# Patient Record
Sex: FEMALE | Race: WHITE | NOT HISPANIC OR LATINO
[De-identification: names, ages, dates, MRNs, and addresses within clinical notes are randomized per-mention and may not be internally consistent; named-entity substitution may affect disease eponyms.]

---

## 2017-06-18 ENCOUNTER — FORM ENCOUNTER (OUTPATIENT)
Age: 77
End: 2017-06-18

## 2017-07-22 ENCOUNTER — INPATIENT (INPATIENT)
Facility: HOSPITAL | Age: 77
LOS: 2 days | Discharge: HOME | DRG: 66 | End: 2017-07-25
Attending: PSYCHIATRY & NEUROLOGY | Admitting: PSYCHIATRY & NEUROLOGY
Payer: MEDICARE

## 2017-07-22 VITALS
TEMPERATURE: 88 F | RESPIRATION RATE: 20 BRPM | DIASTOLIC BLOOD PRESSURE: 84 MMHG | SYSTOLIC BLOOD PRESSURE: 138 MMHG | HEART RATE: 111 BPM

## 2017-07-22 LAB
ALBUMIN SERPL ELPH-MCNC: 3.4 G/DL — SIGNIFICANT CHANGE UP (ref 3.3–5)
ALP SERPL-CCNC: 79 U/L — SIGNIFICANT CHANGE UP (ref 40–120)
ALT FLD-CCNC: 10 U/L RC — SIGNIFICANT CHANGE UP (ref 10–45)
ANION GAP SERPL CALC-SCNC: 14 MMOL/L — SIGNIFICANT CHANGE UP (ref 5–17)
APTT BLD: 26.9 SEC — LOW (ref 27.5–37.4)
AST SERPL-CCNC: 33 U/L — SIGNIFICANT CHANGE UP (ref 10–40)
BASOPHILS # BLD AUTO: 0 K/UL — SIGNIFICANT CHANGE UP (ref 0–0.2)
BASOPHILS NFR BLD AUTO: 0.5 % — SIGNIFICANT CHANGE UP (ref 0–2)
BILIRUB SERPL-MCNC: 1 MG/DL — SIGNIFICANT CHANGE UP (ref 0.2–1.2)
BLD GP AB SCN SERPL QL: POSITIVE — SIGNIFICANT CHANGE UP
BUN SERPL-MCNC: 10 MG/DL — SIGNIFICANT CHANGE UP (ref 7–23)
CALCIUM SERPL-MCNC: 9 MG/DL — SIGNIFICANT CHANGE UP (ref 8.4–10.5)
CHLORIDE SERPL-SCNC: 103 MMOL/L — SIGNIFICANT CHANGE UP (ref 96–108)
CK MB BLD-MCNC: 1.5 % — SIGNIFICANT CHANGE UP (ref 0–3.5)
CK MB CFR SERPL CALC: 1 NG/ML — SIGNIFICANT CHANGE UP (ref 0–3.8)
CK SERPL-CCNC: 65 U/L — SIGNIFICANT CHANGE UP (ref 25–170)
CO2 SERPL-SCNC: 25 MMOL/L — SIGNIFICANT CHANGE UP (ref 22–31)
CREAT SERPL-MCNC: 1.34 MG/DL — HIGH (ref 0.5–1.3)
EOSINOPHIL # BLD AUTO: 0.2 K/UL — SIGNIFICANT CHANGE UP (ref 0–0.5)
EOSINOPHIL NFR BLD AUTO: 2.9 % — SIGNIFICANT CHANGE UP (ref 0–6)
GAS PNL BLDV: SIGNIFICANT CHANGE UP
GLUCOSE SERPL-MCNC: 97 MG/DL — SIGNIFICANT CHANGE UP (ref 70–99)
HCT VFR BLD CALC: 35 % — SIGNIFICANT CHANGE UP (ref 34.5–45)
HGB BLD-MCNC: 11.6 G/DL — SIGNIFICANT CHANGE UP (ref 11.5–15.5)
INR BLD: 1.12 RATIO — SIGNIFICANT CHANGE UP (ref 0.88–1.16)
LYMPHOCYTES # BLD AUTO: 2.7 K/UL — SIGNIFICANT CHANGE UP (ref 1–3.3)
LYMPHOCYTES # BLD AUTO: 40.8 % — SIGNIFICANT CHANGE UP (ref 13–44)
MCHC RBC-ENTMCNC: 30 PG — SIGNIFICANT CHANGE UP (ref 27–34)
MCHC RBC-ENTMCNC: 33.1 GM/DL — SIGNIFICANT CHANGE UP (ref 32–36)
MCV RBC AUTO: 90.9 FL — SIGNIFICANT CHANGE UP (ref 80–100)
MONOCYTES # BLD AUTO: 0.5 K/UL — SIGNIFICANT CHANGE UP (ref 0–0.9)
MONOCYTES NFR BLD AUTO: 7.3 % — SIGNIFICANT CHANGE UP (ref 2–14)
NEUTROPHILS # BLD AUTO: 3.2 K/UL — SIGNIFICANT CHANGE UP (ref 1.8–7.4)
NEUTROPHILS NFR BLD AUTO: 48.5 % — SIGNIFICANT CHANGE UP (ref 43–77)
PLATELET # BLD AUTO: 373 K/UL — SIGNIFICANT CHANGE UP (ref 150–400)
POTASSIUM SERPL-MCNC: 5 MMOL/L — SIGNIFICANT CHANGE UP (ref 3.5–5.3)
POTASSIUM SERPL-SCNC: 5 MMOL/L — SIGNIFICANT CHANGE UP (ref 3.5–5.3)
PROT SERPL-MCNC: 7.3 G/DL — SIGNIFICANT CHANGE UP (ref 6–8.3)
PROTHROM AB SERPL-ACNC: 12.2 SEC — SIGNIFICANT CHANGE UP (ref 9.8–12.7)
RBC # BLD: 3.85 M/UL — SIGNIFICANT CHANGE UP (ref 3.8–5.2)
RBC # FLD: 13.7 % — SIGNIFICANT CHANGE UP (ref 10.3–14.5)
RH IG SCN BLD-IMP: NEGATIVE — SIGNIFICANT CHANGE UP
SODIUM SERPL-SCNC: 142 MMOL/L — SIGNIFICANT CHANGE UP (ref 135–145)
TROPONIN T SERPL-MCNC: <0.01 NG/ML — SIGNIFICANT CHANGE UP (ref 0–0.06)
WBC # BLD: 6.6 K/UL — SIGNIFICANT CHANGE UP (ref 3.8–10.5)
WBC # FLD AUTO: 6.6 K/UL — SIGNIFICANT CHANGE UP (ref 3.8–10.5)

## 2017-07-22 PROCEDURE — 71010: CPT | Mod: 26

## 2017-07-22 PROCEDURE — 99285 EMERGENCY DEPT VISIT HI MDM: CPT

## 2017-07-22 NOTE — ED PROVIDER NOTE - SKIN, MLM
[facility]    [address] [place] [address]    Phone:  [phone]    Fax:  [phone]       Thank You for choosing us for your health care visit. We are glad to serve you and happy to provide you with this summary of your visit. Please help us to ensure we have accurate records. If you find anything that needs to be changed, please let our staff know as soon as possible.          Your Demographic Information     Patient Name Sex Anni Lofton Female 1992       Ethnic Group Patient Race    / Origin White      Your Visit Details     Date & Time Provider Department    2017 7:45 PM Anne Marie Painting NP [facility]      Your To Do List     Follow-Up    Return if symptoms worsen or fail to improve.      Conditions Discussed Today or Order-Related Diagnoses        Comments    Allergic rhinitis, unspecified allergic rhinitis trigger, unspecified rhinitis seasonality    -  Primary       Your Vitals Were     Resp Height Weight BMI Smoking Status       16 5' 5\" (1.651 m) 125 lb (56.7 kg) 20.8 kg/m2 Former Smoker       Medications Prescribed or Re-Ordered Today     None      Your Current Medications Are        Disp Refills Start End    amoxicillin (AMOXIL) 875 MG tablet 20 tablet 0 2017    Sig - Route: Take 1 tablet by mouth 2 times daily for 10 days. - Oral    Class: Eprescribe      Allergies     No Known Allergies      Immunizations History as of 2017     Name Date    HPV QUAD 2008, 2008, 2008    Tdap 2008            Patient Instructions     None      
R neck scabbing, well healed surgical scar.

## 2017-07-22 NOTE — ED PROVIDER NOTE - MEDICAL DECISION MAKING DETAILS
Charis resident: 76 y/o with hx of recent R sided CEA p/w L facial droop and slurred speech - remainder of stroke exam WNL - last normal at 1 pm - will CT head and CTA head and neck to eval for stroke and d/w neuro

## 2017-07-22 NOTE — ED PROVIDER NOTE - ATTENDING CONTRIBUTION TO CARE
Patient with facial droop and change in speech since 1pm. Moderate. Persistent. History of right carotid stent with recent reevaluation of this. Not better with time.   patient with droop to left face, mild slurred speech, droop involves v2,3 on left and spares forehead, perrl, eomi, mmm, cooperative, no tongue deviation, no edema, non-tachycardic, non-tachypneic, no rash, 4+/5 lue, 5/5 rue, 5/5 bilateral lower extremities, soft, ntnd, no rebound/guarding  will get iv, labs, ct head, asa coverage if no ich, neuro consult and admit to neurology.

## 2017-07-22 NOTE — ED PROVIDER NOTE - OBJECTIVE STATEMENT
78 y/o female HLD, COPD p/w facial droop and slurred speech. Per daughter, patient noted to have L sided facial droop and slurred speech at 1 pm today. BIBA for possible stroke. Denies any headache, vision changes, CP, SOB, N/V/D, numbness, weakness. Recent R carotid stent in May, surgery went well.  PMD: Dr. Coleman

## 2017-07-22 NOTE — ED PROVIDER NOTE - PROGRESS NOTE DETAILS
spoke with neurology - will see patient in ED R MCA infarct in frontal lobe, CTA shows branch occlusion of opercunlar branch

## 2017-07-22 NOTE — ED PROVIDER NOTE - CRANIAL NERVE AND PUPILLARY EXAM
extra-ocular movements intact/peripheral vision intact/L facial droop and slurred speech - able to wrinkle forehead on both sides of face

## 2017-07-22 NOTE — ED PROVIDER NOTE - EYES, MLM
Clear bilaterally, pupils equal, round and reactive to light. EOMI. Periopheral and central vision intact.

## 2017-07-23 DIAGNOSIS — I63.59 CEREBRAL INFARCTION DUE TO UNSPECIFIED OCCLUSION OR STENOSIS OF OTHER CEREBRAL ARTERY: ICD-10-CM

## 2017-07-23 LAB
CK MB CFR SERPL CALC: 1 NG/ML — SIGNIFICANT CHANGE UP (ref 0–3.8)
CK SERPL-CCNC: 40 U/L — SIGNIFICANT CHANGE UP (ref 25–170)
TROPONIN T SERPL-MCNC: <0.01 NG/ML — SIGNIFICANT CHANGE UP (ref 0–0.06)

## 2017-07-23 PROCEDURE — 70548 MR ANGIOGRAPHY NECK W/DYE: CPT | Mod: 26

## 2017-07-23 PROCEDURE — 99223 1ST HOSP IP/OBS HIGH 75: CPT

## 2017-07-23 PROCEDURE — 70496 CT ANGIOGRAPHY HEAD: CPT | Mod: 26

## 2017-07-23 PROCEDURE — 70551 MRI BRAIN STEM W/O DYE: CPT | Mod: 26

## 2017-07-23 PROCEDURE — 93010 ELECTROCARDIOGRAM REPORT: CPT

## 2017-07-23 PROCEDURE — 70450 CT HEAD/BRAIN W/O DYE: CPT | Mod: 26,77

## 2017-07-23 PROCEDURE — 70498 CT ANGIOGRAPHY NECK: CPT | Mod: 26

## 2017-07-23 RX ORDER — ATORVASTATIN CALCIUM 80 MG/1
80 TABLET, FILM COATED ORAL AT BEDTIME
Qty: 0 | Refills: 0 | Status: DISCONTINUED | OUTPATIENT
Start: 2017-07-23 | End: 2017-07-25

## 2017-07-23 RX ORDER — ENOXAPARIN SODIUM 100 MG/ML
40 INJECTION SUBCUTANEOUS EVERY 24 HOURS
Qty: 0 | Refills: 0 | Status: DISCONTINUED | OUTPATIENT
Start: 2017-07-23 | End: 2017-07-25

## 2017-07-23 RX ORDER — ASPIRIN/CALCIUM CARB/MAGNESIUM 324 MG
81 TABLET ORAL DAILY
Qty: 0 | Refills: 0 | Status: DISCONTINUED | OUTPATIENT
Start: 2017-07-23 | End: 2017-07-25

## 2017-07-23 RX ORDER — LABETALOL HCL 100 MG
10 TABLET ORAL ONCE
Qty: 0 | Refills: 0 | Status: COMPLETED | OUTPATIENT
Start: 2017-07-23 | End: 2017-07-23

## 2017-07-23 RX ADMIN — Medication 10 MILLIGRAM(S): at 02:43

## 2017-07-23 RX ADMIN — Medication 81 MILLIGRAM(S): at 13:15

## 2017-07-23 RX ADMIN — ATORVASTATIN CALCIUM 80 MILLIGRAM(S): 80 TABLET, FILM COATED ORAL at 21:28

## 2017-07-23 RX ADMIN — ENOXAPARIN SODIUM 40 MILLIGRAM(S): 100 INJECTION SUBCUTANEOUS at 13:15

## 2017-07-23 NOTE — H&P ADULT - NSHPLABSRESULTS_GEN_ALL_CORE
< from: CT Head No Cont (07.23.17 @ 01:11) >    NONCONTRAST HEAD CT: Acute/subacute right MCA territorial infarction   without hemorrhagic transformation.    Additional microvascular disease.    CTA NECK: Evidence of right-sided carotid endarterectomy. Atherosclerotic   disease involving the left carotid bifurcation with approximately 20-30%   stenosis of the left internal carotid artery origin by NASCET criteria.    CTA HEAD: Branch occlusion of an opercular branch of the right middle   cerebral artery.

## 2017-07-23 NOTE — PROVIDER CONTACT NOTE (OTHER) - BACKGROUND
episode lasted about 10 seconds and then patient returned to baseline. pts BP during episode was 104/74 with pulse 101. post episode BP was 148/83 and pulse 99.

## 2017-07-23 NOTE — ED ADULT NURSE REASSESSMENT NOTE - NS ED NURSE REASSESS COMMENT FT1
0014 pt still waiting for ct scan to be performed/pt noted speech slurring has resolved for now/vss/pending ct and results/gcruz
Pt AAOx4, NAD, resting comfortably in bed, pt awaiting bed, /90, MD aware, 10mg Labetalol IVP given as ordered by MD, 4 Andrea RN made aware in verbal report, report called to 4 Andrea RN Andaria. Pt passed dysphagia screening, 4 Andrea RN made aware in verbal report. Pt on CCM with . Pt denies any pain. Pt neurologically intact, PERRLA at 3mm, no slurred speech at this time, pt states "my voice sounds normal again," facial droop resolved, pt moving all extremities, extremities equal and strong x 4. Pt awaiting  transport to floor. Safety maintained.  Transport here to pt at 0255.

## 2017-07-23 NOTE — H&P ADULT - ASSESSMENT
Pt is a 76 yo F with a PMH of HLD and COPD who came into the ED complaining of left facial droop and slurred speech. NIHSS: 1. RAVI: 1.    Diagnosis:  - Subacute Right MCA Territory Infarct    Recommendations:  - Admission to Neuro Floor for monitoring to be followed by stroke team  - Continuous tele monitoring  - Neuro checks Q4  - MRI Head  - MRA Head and Neck  - TTE with bubble study  - ASA and Lipitor when possible  - PT/OT  - Speech and Swallow Eval  - DVT ppx  - Permissive HTN to 220/110

## 2017-07-23 NOTE — PROVIDER CONTACT NOTE (OTHER) - SITUATION
PCA with patient walking pt back from bathroom and patient had a new syncope and stopped following commands, patient put back to bed. patient did not fall.

## 2017-07-23 NOTE — H&P ADULT - HISTORY OF PRESENT ILLNESS
Pt is a 78 yo F with a PMH of HLD and COPD who came into the ED complaining of left facial droop and slurred speech. As per daughter the patient was noted to have this left sided facial droop and slurred speech at 1 pm today. Pt denies any fevers, chills, sweats, headache, vision changes, CP, SOB, numbness or extremity weakness. Of note pt had a right CEA in May 2017. NIHSS: 1. RAVI: 1.

## 2017-07-23 NOTE — PROVIDER CONTACT NOTE (OTHER) - ACTION/TREATMENT ORDERED:
MD Allen at bedside assessing patient. ordered urgent head CT, cardiac enzymes and labs, and an EKG, and bedrest until further notice.

## 2017-07-23 NOTE — ED ADULT NURSE NOTE - OBJECTIVE STATEMENT
2130 pt 77y f aox3 bib ems, presented w/ lft facial droop and slurred speech per family last seen normal was possibly 1pm today, daughter at bedside said her sister called her and states that the mother is not speaking right, called ems, arrived vss at this time pending ct/will monitor rsr/gcruz

## 2017-07-23 NOTE — H&P ADULT - NSHPPHYSICALEXAM_GEN_ALL_CORE
General Exam:   General appearance: No acute distress      Neurological Exam:  Mental Status: Orientated to self, date and place.  Attention intact.  No dysarthria. Speech fluent.  Cranial Nerves:   PERRL, EOMI, VFF, no nystagmus.    CN V1-3 intact to light touch .  Mild naso-labial flattening on the left. Tongue, uvula and palate midline.  Sternocleidomastoid and Trapezius intact bilaterally.    Motor:   Tone: normal.                  Strength:     [] Upper extremity                      Delt       Bicep    Tricep                                                  R         5/5 5/5 5/5 5/5                                               L          5/5 5/5 5/5 5/5  [] Lower extremity                       HF          KE          KF        DF         PF                                               R        5/5 5/5 5/5 5/5 5/5                                               L         5/5 5/5 5/5 5/5 5/5  Pronator drift: none                 Dysmetria: None to finger-nose-finger or heel-shin-heel  Tremor: No resting, postural or action tremor.  No myoclonus.    Sensation: intact to light touch    Deep Tendon Reflexes:              Biceps    BR        Patellar        Ankle      Babinski                                         R       2+          2+         2+               2+          downgoing                                         L        2+          2+         2+              2+           downgoing

## 2017-07-23 NOTE — ED ADULT NURSE REASSESSMENT NOTE - GENERAL PATIENT STATE
comfortable appearance/cooperative/improvement verbalized/resting/sleeping/smiling/interactive
anxious

## 2017-07-23 NOTE — H&P ADULT - ATTENDING COMMENTS
Ms. Thomas is a 78 yo F with a PMH of HLD and COPD , status post right CEA in May 2017 Hospital who came into the ED complaining of left facial droop and slurred speech. As per daughter the patient was noted to have this left sided facial droop and slurred speech at 1 pm on 7/22/2017.her neurological exam is significant for right-sided gaze preference, might left nasolabial fold asymmetry. She also had an episode of shaking with altered sensorium since her admission.  Impression Right MCA territory ischemic infarct likely embolic in origin versus large artery atherosclerosis  CT angiogram does not show significant stenosis in the area of right internal carotid,  She had right-sided carotid endarterectomy performed in the hospital; arrange records and also get a vascular surgery consultation  MRI brain stroke protocol  EEG  PT OT speech evaluation

## 2017-07-24 ENCOUNTER — TRANSCRIPTION ENCOUNTER (OUTPATIENT)
Age: 77
End: 2017-07-24

## 2017-07-24 LAB
CHOLEST SERPL-MCNC: 137 MG/DL — SIGNIFICANT CHANGE UP (ref 10–199)
HBA1C BLD-MCNC: 5.2 % — SIGNIFICANT CHANGE UP (ref 4–5.6)
HDLC SERPL-MCNC: 45 MG/DL — SIGNIFICANT CHANGE UP (ref 40–125)
LIPID PNL WITH DIRECT LDL SERPL: 69 MG/DL — SIGNIFICANT CHANGE UP
TOTAL CHOLESTEROL/HDL RATIO MEASUREMENT: 3 RATIO — LOW (ref 3.3–7.1)
TRIGL SERPL-MCNC: 113 MG/DL — SIGNIFICANT CHANGE UP (ref 10–149)

## 2017-07-24 PROCEDURE — 95819 EEG AWAKE AND ASLEEP: CPT | Mod: 26

## 2017-07-24 PROCEDURE — 95957 EEG DIGITAL ANALYSIS: CPT | Mod: 26

## 2017-07-24 RX ORDER — BUDESONIDE AND FORMOTEROL FUMARATE DIHYDRATE 160; 4.5 UG/1; UG/1
2 AEROSOL RESPIRATORY (INHALATION)
Qty: 0 | Refills: 0 | Status: DISCONTINUED | OUTPATIENT
Start: 2017-07-24 | End: 2017-07-25

## 2017-07-24 RX ORDER — CLOPIDOGREL BISULFATE 75 MG/1
75 TABLET, FILM COATED ORAL DAILY
Qty: 0 | Refills: 0 | Status: DISCONTINUED | OUTPATIENT
Start: 2017-07-25 | End: 2017-07-25

## 2017-07-24 RX ORDER — PANTOPRAZOLE SODIUM 20 MG/1
40 TABLET, DELAYED RELEASE ORAL
Qty: 0 | Refills: 0 | Status: DISCONTINUED | OUTPATIENT
Start: 2017-07-24 | End: 2017-07-25

## 2017-07-24 RX ORDER — CLOPIDOGREL BISULFATE 75 MG/1
75 TABLET, FILM COATED ORAL DAILY
Qty: 0 | Refills: 0 | Status: DISCONTINUED | OUTPATIENT
Start: 2017-07-24 | End: 2017-07-24

## 2017-07-24 RX ORDER — CLOPIDOGREL BISULFATE 75 MG/1
1 TABLET, FILM COATED ORAL
Qty: 0 | Refills: 0 | COMMUNITY
Start: 2017-07-24

## 2017-07-24 RX ORDER — CLOPIDOGREL BISULFATE 75 MG/1
1 TABLET, FILM COATED ORAL
Qty: 30 | Refills: 0 | OUTPATIENT
Start: 2017-07-24

## 2017-07-24 RX ORDER — CLOPIDOGREL BISULFATE 75 MG/1
75 TABLET, FILM COATED ORAL ONCE
Qty: 0 | Refills: 0 | Status: COMPLETED | OUTPATIENT
Start: 2017-07-24 | End: 2017-07-24

## 2017-07-24 RX ADMIN — Medication 81 MILLIGRAM(S): at 11:30

## 2017-07-24 RX ADMIN — ATORVASTATIN CALCIUM 80 MILLIGRAM(S): 80 TABLET, FILM COATED ORAL at 22:01

## 2017-07-24 RX ADMIN — CLOPIDOGREL BISULFATE 75 MILLIGRAM(S): 75 TABLET, FILM COATED ORAL at 15:05

## 2017-07-24 RX ADMIN — ENOXAPARIN SODIUM 40 MILLIGRAM(S): 100 INJECTION SUBCUTANEOUS at 15:07

## 2017-07-24 NOTE — DISCHARGE NOTE ADULT - PATIENT PORTAL LINK FT
“You can access the Follow[employer] Patient Portal, offered by [facility], by registering with the following website: [email or web address]”

## 2017-07-24 NOTE — DISCHARGE NOTE ADULT - CARE PLAN
Principal Discharge DX:	Cerebrovascular accident (CVA) due to occlusion of other cerebral artery  Goal:	Secondary Stroke Prevention  Instructions for follow-up, activity and diet:	Please follow up with your primary medical doctor within one week of discharge from the hospital.    Please follow up with your neurologist within one week of discharge from the hospital. Please also continue to take your Plavix for th next 3 months and discuss with your neurologist when to stop taking it.    Please follow up with your cardiologist within one week of discharge from the hospital. Please discuss with them your need for an echocardiogram and a loop recorder.    Please continue to take your home medications as prescribed by your doctor.    Please continue to participate in your physical therapy. Principal Discharge DX:	Cerebrovascular accident (CVA) due to occlusion of other cerebral artery  Goal:	Secondary Stroke Prevention  Instructions for follow-up, activity and diet:	Please follow up with your primary medical doctor within one week of discharge from the hospital.  Please follow up with your neurologist within one week of discharge from the hospital. Please also continue to take your Plavix for the next 3 week and discuss with your neurologist when to stop taking it.  Please follow up with your cardiologist within one week of discharge from the hospital. Please discuss with them your need for an echocardiogram and a loop recorder.  Please continue to take your home medications as prescribed by your doctor.  Please continue to participate in your physical therapy.

## 2017-07-24 NOTE — OCCUPATIONAL THERAPY INITIAL EVALUATION ADULT - LIVES WITH, PROFILE
spouse/Pt lives with  in private home, has 2 stairs with railing on both sides to enter home, and 1 flight of stairs inside. Pt's bedroom and bathroom is on ground floor, with a tub shower that has bars, and a raised toilet seat with grab bars. Pt is right handed, wears glasses for reading, ambulates with rolling walker, and was previously independent in ADLs and most IADLs.  helped her as needed with some IADLs.

## 2017-07-24 NOTE — PROGRESS NOTE ADULT - SUBJECTIVE AND OBJECTIVE BOX
THE PATIENT WAS SEEN AND EXAMINED BY ME WITH THE HOUSESTAFF AND STROKE TEAM DURING MORNING ROUNDS.   HPI:  Pt is a 78 yo F with a PMH of HLD and COPD who came into the ED complaining of left facial droop and slurred speech. As per daughter the patient was noted to have this left sided facial droop and slurred speech at 1 pm day of admission. Pt denied any fevers, chills, sweats, headache, vision changes, CP, SOB, numbness or extremity weakness. Of note pt had a right CEA in May 2017. NIHSS: 1. RAVI: 1.     SUBJECTIVE: No events overnight.  No new neurologic complaints.      aspirin enteric coated 81 milliGRAM(s) Oral daily  enoxaparin Injectable 40 milliGRAM(s) SubCutaneous every 24 hours  atorvastatin 80 milliGRAM(s) Oral at bedtime      PHYSICAL EXAM:   Vital Signs Last 24 Hrs  T(C): 36.8 (24 Jul 2017 07:54), Max: 36.8 (23 Jul 2017 23:43)  T(F): 98.3 (24 Jul 2017 07:54), Max: 98.3 (24 Jul 2017 07:54)  HR: 107 (24 Jul 2017 12:35) (83 - 107)  BP: 131/75 (24 Jul 2017 12:35) (131/75 - 170/79)  BP(mean): --  RR: 18 (24 Jul 2017 07:54) (18 - 18)  SpO2: 91% (24 Jul 2017 12:35) (90% - 96%)    General: No acute distress  HEENT: EOM intact, visual fields full  Abdomen: Soft, nontender, nondistended   Extremities: No edema    NEUROLOGICAL EXAM:  Mental status: Awake, alert, oriented x3, no aphasia, no neglect, normal memory   Cranial Nerves: mild left nasolabial fold asymmmetry, no nystagmus, no dysarthria, no dysphagia, tongue midline, shoulder shrug intact bilaterally.  Motor exam: moves all extremities well against gravity.  Sensation: Intact to light touch   Coordination/ Gait: No dysmetria, FELICIANO intact and symmetric bilaterally    LABS:                        11.6   6.6   )-----------( 373      ( 22 Jul 2017 20:44 )             35.0    07-22    142  |  103  |  10  ----------------------------<  97  5.0   |  25  |  1.34<H>    Ca    9.0      22 Jul 2017 20:44    TPro  7.3  /  Alb  3.4  /  TBili  1.0  /  DBili  x   /  AST  33  /  ALT  10  /  AlkPhos  79  07-22  PT/INR - ( 22 Jul 2017 20:44 )   PT: 12.2 sec;   INR: 1.12 ratio         PTT - ( 22 Jul 2017 20:44 )  PTT:26.9 sec      IMAGING: Reviewed by me.     MRI Head w/o Cont (07.23.17)   1. Acute infarct involving the right anterior superior MCA territory   without associated hemorrhage or mass effect.  2. Suspicion of a ranch occlusion of the opercular segment of the MCA on   the right  CT Head No Cont 07.23.17   Stable right MCA territory infarction involving the frontal lobe.

## 2017-07-24 NOTE — PHYSICAL THERAPY INITIAL EVALUATION ADULT - PLANNED THERAPY INTERVENTIONS, PT EVAL
postural re-education/transfer training/balance training/strengthening/bed mobility training/gait training

## 2017-07-24 NOTE — DISCHARGE NOTE ADULT - OTHER SIGNIFICANT FINDINGS
A1c 5.2, LDL 69    CT Head:   NONCONTRAST HEAD CT: Acute/subacute right MCA territorial infarction without hemorrhagic transformation.  Additional microvascular disease.    CTA NECK: Evidence of right-sided carotid endarterectomy. Atherosclerotic disease involving the left carotid bifurcation with approximately 20-30% stenosis of the left internal carotid artery origin by NASCET criteria.    CTA HEAD: Branch occlusion of an opercular branch of the right middle cerebral artery.    MRI Brain w/o:   1. Acute infarct involving the right anterior superior MCA territory without associated hemorrhage or mass effect.  2. Suspicion of a ranch occlusion of the opercular segment of the MCA on the right.

## 2017-07-24 NOTE — DISCHARGE NOTE ADULT - CARE PROVIDER_API CALL
Kevin Cottrell (DO), Neurology; Vascular Neurology  [address]  [place] [address]  Phone: [phone]  Fax: [phone] Kevin Cottrell (DO), Neurology; Vascular Neurology  [address]  [place] [address]  Phone: [phone]  Fax: [phone]    Zahraa Mantilla (DO), Cardiology; Internal Medicine  [address] [facility] [address]  [place] [address]  Phone: [phone]  Fax: [phone]    Primary, Doctor  Phone: (   )    -  Fax: (   )    -

## 2017-07-24 NOTE — DISCHARGE NOTE ADULT - HOSPITAL COURSE
HPI:  Pt is a 78 yo F with a PMH of HLD and COPD who came into the ED complaining of left facial droop and slurred speech. As per daughter the patient was noted to have this left sided facial droop and slurred speech at 1 pm today. Pt denies any fevers, chills, sweats, headache, vision changes, CP, SOB, numbness or extremity weakness. Of note pt had a right CEA in May 2017. NIHSS: 1. RAVI: 1. (23 Jul 2017 02:35)    CT Head w/o done on 7/23 exhibited an acute/subacute right MCA territorial infarction without hemorrhagic transformation. Microvascular disease.    CTA Head done on 7/23 exhibited evidence of right-sided carotid endarterectomy. Atherosclerotic disease involving the left carotid bifurcation with approximately 20-30% stenosis of the left internal carotid artery origin by NASCET criteria.    CTA Neck done on 7/23 exhibited Branch occlusion of an opercular branch of the right middle cerebral artery.    MRI Head w/o, MRA Head w/ and MRA Neck w/o done on 7/23 exhibited an acute infarct involving the right anterior superior MCA territory without associated hemorrhage or mass effect. Suspicion of a branch occlusion of the opercular segment of the MCA on the right.    Labs done on 7/24 show an LDL of 69.    Labs done on 7/25 show an HbA1c of 5.2.    On 7/24 PT/OT evaluated the pt and decided her disposition to be home (with home care), while a swallow eval done on admission showed the pt to be capable of swallowing a regular diet.    The decision to perform an echocardiogram is not indicated at this time and could be attained as an outpatient. EKG along with constant telemetry monitoring showed NSR, further assess for possible A-Fib with a Loop Recorder will be left for the patient's cardiologist as an outpatient. Stroke cause is still cryptogenic. Pt is a 78 yo F with a PMH of HLD and COPD who came into the ED complaining of left facial droop and slurred speech. As per daughter the patient was noted to have this left sided facial droop and slurred speech at 1 pm today. Pt denies any fevers, chills, sweats, headache, vision changes, CP, SOB, numbness or extremity weakness. Of note pt had a right CEA in May 2017. NIHSS: 1. RAVI: 1. She admitted to the Stroke Service for further evaluation and management.     CT Head w/o done on 7/23 exhibited an acute/subacute right MCA territorial infarction without hemorrhagic transformation. Microvascular disease.    CTA Head done on 7/23 exhibited evidence of right-sided carotid endarterectomy. Atherosclerotic disease involving the left carotid bifurcation with approximately 20-30% stenosis of the left internal carotid artery origin by NASCET criteria.    CTA Neck done on 7/23 exhibited Branch occlusion of an opercular branch of the right middle cerebral artery.    MRI Head w/o, MRA Head w/ and MRA Neck w/o done on 7/23 exhibited an acute infarct involving the right anterior superior MCA territory without associated hemorrhage or mass effect. Suspicion of a branch occlusion of the opercular segment of the MCA on the right.    A1c 5.2, LDL 69    Etiology of pt's stroke is likely embolic of unknown source. She is recommended to follow-up with her cardiologist for outpatient echo and loop for further evaluation.     Of note, pt had a syncopal event while inpatient. Her vitals remained stable. Cardiology consult called for further input. No changes in EKG. No events on tele. Routine EEG was unremarkable.     She is currently tolerating a regular texture diet. She was seen and evaluated by PT/OT, who recommended home PT/OT.     Please continue Asa and Plavix together for 3 weeks. Continue all other meds as directed. Follow-up with Stroke Neurology Dr. Cottrell in 1 week and your Cardiologist in 1 week for outpatient echo and loop.

## 2017-07-24 NOTE — DISCHARGE NOTE ADULT - MEDICATION SUMMARY - MEDICATIONS TO STOP TAKING
I will STOP taking the medications listed below when I get home from the hospital:    meclizine 25 mg oral tablet  -- 1 tab(s) by mouth every 8 hours, As Needed - for dizziness  -- May cause drowsiness.  Alcohol may intensify this effect.  Use care when operating dangerous machinery.    amLODIPine 5 mg oral tablet  -- 1 tab(s) by mouth once a day  -- It is very important that you take or use this exactly as directed.  Do not skip doses or discontinue unless directed by your doctor.  Some non-prescription drugs may aggravate your condition.  Read all labels carefully.  If a warning appears, check with your doctor before taking. I will STOP taking the medications listed below when I get home from the hospital:  None I will STOP taking the medications listed below when I get home from the hospital:    amLODIPine 5 mg oral tablet  -- 1 tab(s) by mouth once a day  -- It is very important that you take or use this exactly as directed.  Do not skip doses or discontinue unless directed by your doctor.  Some non-prescription drugs may aggravate your condition.  Read all labels carefully.  If a warning appears, check with your doctor before taking.

## 2017-07-24 NOTE — PHYSICAL THERAPY INITIAL EVALUATION ADULT - PERTINENT HX OF CURRENT PROBLEM, REHAB EVAL
76 yo F with a PMH of HLD and COPD who came into the ED complaining of left facial droop and slurred speech admit with subacute R MCA infarct 78 yo F with a PMH of HLD and COPD who came into the ED complaining of left facial droop and slurred speech admit with subacute R MCA infarct. (cont. below):

## 2017-07-24 NOTE — DISCHARGE NOTE ADULT - PROVIDER TOKENS
TOKOMAR:'[id number]' TOKEN:'[id number]',TOKEN:'[id number]',FREE:[LAST:[Primary],FIRST:[Doctor],PHONE:[(   )    -],FAX:[(   )    -]]

## 2017-07-24 NOTE — DISCHARGE NOTE ADULT - PLAN OF CARE
Secondary Stroke Prevention Please follow up with your primary medical doctor within one week of discharge from the hospital.    Please follow up with your neurologist within one week of discharge from the hospital. Please also continue to take your Plavix for th next 3 months and discuss with your neurologist when to stop taking it.    Please follow up with your cardiologist within one week of discharge from the hospital. Please discuss with them your need for an echocardiogram and a loop recorder.    Please continue to take your home medications as prescribed by your doctor.    Please continue to participate in your physical therapy. Please follow up with your primary medical doctor within one week of discharge from the hospital.  Please follow up with your neurologist within one week of discharge from the hospital. Please also continue to take your Plavix for the next 3 week and discuss with your neurologist when to stop taking it.  Please follow up with your cardiologist within one week of discharge from the hospital. Please discuss with them your need for an echocardiogram and a loop recorder.  Please continue to take your home medications as prescribed by your doctor.  Please continue to participate in your physical therapy.

## 2017-07-24 NOTE — EEG REPORT - NS EEG TEXT BOX
[facility]  Report of Routine EEG with Video  And Report of DigitalCompressed Spectral Array Analysis    [facility]: [address], [address] [place] [address], Phone: [phone]  [facility]: [address], [place] [address], Phone: [phone]  Office: [address] [place] [address], [place] [address], [place] [address], Phone: [phone]    Study Date: 7/24/2017		    Technical Information:					  On Instrument: -  Placement and Labeling of Electrodes:  The EEG was performed utilizing 20 channels referential EEG connections (coronal over temporal over parasagittal montage) using all standard 10-20 electrode placements with EKG.  Recording was at a sampling rate of 256 samples per second per channel.  Time synchronized digital video recording was done simultaneously with EEG recording.  A low light infrared camera was used for low light recording.  Hakeem and seizure detection algorithms were utilized.  CSA Technical Component:  Quantitative EEG analysis using a separate Compressed Spectral Array (CSA) software package was conducted in real-time and run at bedside after set up by the technician, digitally displaying the power of electrographic frequencies included in the 1-30Hz band using a graded color map.  This data was reviewed and interpreted independently, and is reported in a separate section below.    History:  h/o HLD, COPD  p/w left facial droop, and slurred speech    Medication	  No Data.	    Study Interpretation:    FINDINGS:  The background was continuous, spontaneously variable and reactive. There was no posterior dominant rhythm visualized. The background consisted of continuous generalized irregular theta and delta activity, more prominent over the right hemisphere than the left.     Sleep Background:  No sleep transients recorded    Epileptiform Activity:   No epileptiform discharges were present.    Events:  No clinical events were recorded.  No seizures were recorded.    Activation Procedures:   -Hyperventilation was not performed.    -Photic stimulation was not performed.    Artifacts:  Intermittent myogenic and movement artifacts were noted.    ECG:  The heart rate on single channel ECG was predominantly between 78-80 BPM.    Compressed Spectral Array Digital Analysis    FINDINGS:  Compressed Spectral Array (CSA) data was reviewed separately and correlated with the electroencephalographic findings detailed above.  CSA showed a variable spectral pattern.  Areas of increased power in particular were reviewed in detail, and compared with the raw EEG data.  Areas of abrupt increases in spectral power were reviewed to exclude seizures, and were determined to be artifactual in nature.    The relative ratio of the power of delta range frequencies and faster frequencies remained stable over the course of the study.  There was definitive increase in the relative power in the delta frequency spectrum apparent in the right hemisphere versus the left hemisphere.      Compressed Spectral Array (Digital Analysis) Summary/ Impression:  Increased power in delta frequency over the right hemisphere.      EEG Classification / Summary:  Abnormal Routine EEG Study   -Background slowing, generalized, more prominent over the right hemisphere    Clinical Impression:  The findings indicate moderate nonspecific multifocal or diffuse cerebral dysfunction more prominent over the right hemisphere.There were no epileptiform abnormalities recorded.          Elodia Hawkins DO  Fellow in Neurophysiology/Epilepsy, [facility] [place]      	  Macho Bailey M.D.			    Attending Physician, [facility]  Report of Routine EEG with Video  And Report of DigitalCompressed Spectral Array Analysis    [facility]: [address], [address] [place] [address], Phone: [phone]  [facility]: [address], [place] [address], Phone: [phone]  Office: [address] [place] [address], [place] [address], [place] [address], Phone: [phone]    Study Date: 7/24/2017		    Technical Information:					  On Instrument: -  Placement and Labeling of Electrodes:  The EEG was performed utilizing 20 channels referential EEG connections (coronal over temporal over parasagittal montage) using all standard 10-20 electrode placements with EKG.  Recording was at a sampling rate of 256 samples per second per channel.  Time synchronized digital video recording was done simultaneously with EEG recording.  A low light infrared camera was used for low light recording.  Hakeem and seizure detection algorithms were utilized.  CSA Technical Component:  Quantitative EEG analysis using a separate Compressed Spectral Array (CSA) software package was conducted in real-time and run at bedside after set up by the technician, digitally displaying the power of electrographic frequencies included in the 1-30Hz band using a graded color map.  This data was reviewed and interpreted independently, and is reported in a separate section below.    History:  h/o HLD, COPD  p/w left facial droop, and slurred speech    Medication	  No Data.	    Study Interpretation:    FINDINGS:  The background was continuous, spontaneously variable and reactive. There was no posterior dominant rhythm visualized. The background consisted of continuous generalized irregular theta and delta activity, more prominent over the right hemisphere than the left.     Sleep Background:  No sleep transients recorded    Epileptiform Activity:   No epileptiform discharges were present.    Events:  No clinical events were recorded.  No seizures were recorded.    Activation Procedures:   -Hyperventilation was not performed.    -Photic stimulation was not performed.    Artifacts:  Intermittent myogenic and movement artifacts were noted.    ECG:  The heart rate on single channel ECG was predominantly between 78-80 BPM.    Compressed Spectral Array Digital Analysis    FINDINGS:  Compressed Spectral Array (CSA) data was reviewed separately and correlated with the electroencephalographic findings detailed above.  CSA showed a variable spectral pattern.  Areas of increased power in particular were reviewed in detail, and compared with the raw EEG data.  Areas of abrupt increases in spectral power were reviewed to exclude seizures, and were determined to be artifactual in nature.    The relative ratio of the power of delta range frequencies and faster frequencies remained stable over the course of the study.  There was definitive increase in the relative power in the delta frequency spectrum apparent in the right hemisphere versus the left hemisphere.      Compressed Spectral Array (Digital Analysis) Summary/ Impression:  Increased power in delta frequency over the right hemisphere.      EEG Classification / Summary:  Abnormal Routine EEG Study   -Background slowing, generalized, more prominent over the right hemisphere    Clinical Impression:  The findings indicate moderate nonspecific multifocal or diffuse cerebral dysfunction more prominent over the right hemisphere.There were no epileptiform abnormalities recorded.          Elodia Hawkins DO  Fellow in Neurophysiology/Epilepsy, [facility] [place]      	  Macho Bailey M.D.			    Attending Physician, [facility] [place]

## 2017-07-24 NOTE — PHYSICAL THERAPY INITIAL EVALUATION ADULT - DISCHARGE DISPOSITION, PT EVAL
home w/ home PT/Home with home PT for gait, balance, & strength training and to return pt to baseline functional mobility status. Rolling walker with ambulation (pt owns). Supervision/assist with mobility skills at this time (pt states spouse and daughters can provide).

## 2017-07-24 NOTE — DISCHARGE NOTE ADULT - MEDICATION SUMMARY - MEDICATIONS TO TAKE
I will START or STAY ON the medications listed below when I get home from the hospital:    Ecotrin  --  by mouth   -- Indication: For Cerebral infarction due to occlusion or stenosis of other cerebral artery    ondansetron  --  by mouth   -- Indication: For Nausea    atorvastatin  --  by mouth   -- Indication: For Cerebral infarction due to occlusion or stenosis of other cerebral artery    Plavix 75 mg oral tablet  -- 1 tab(s) by mouth once a day  -- Do not take aspirin or aspirin containing products without knowledge and consent of your physician.    -- Indication: For Cerebral infarction due to occlusion or stenosis of other cerebral artery    Advair Diskus  --  inhaled   -- Indication: For COPD    pantoprazole  --  by mouth   -- Indication: For GERD I will START or STAY ON the medications listed below when I get home from the hospital:    Ecotrin  --  by mouth   -- Indication: For Stroke Prevention    ondansetron  --  by mouth   -- Indication: For Nausea    atorvastatin  --  by mouth   -- Indication: For Hyperlipidemia    Plavix 75 mg oral tablet  -- 1 tab(s) by mouth once a day  -- Do not take aspirin or aspirin containing products without knowledge and consent of your physician.    -- Indication: For Stroke Prevention    Advair Diskus  --  inhaled   -- Indication: For COPD    pantoprazole  --  by mouth   -- Indication: For GERD I will START or STAY ON the medications listed below when I get home from the hospital:    Ecotrin  --  by mouth   -- Indication: For Stroke Prevention    ondansetron  --  by mouth   -- Indication: For Nausea    atorvastatin  --  by mouth   -- Indication: For Hyperlipidemia    Plavix 75 mg oral tablet  -- 1 tab(s) by mouth once a day  -- Do not take aspirin or aspirin containing products without knowledge and consent of your physician.    -- Indication: For Stroke Prevention    Advair Diskus  --  inhaled   -- Indication: For COPD    amLODIPine 10 mg oral tablet  -- 1 tab(s) by mouth once a day  -- It is very important that you take or use this exactly as directed.  Do not skip doses or discontinue unless directed by your doctor.  Some non-prescription drugs may aggravate your condition.  Read all labels carefully.  If a warning appears, check with your doctor before taking.    -- Indication: For Hypertension    pantoprazole  --  by mouth   -- Indication: For GERD

## 2017-07-24 NOTE — OCCUPATIONAL THERAPY INITIAL EVALUATION ADULT - ADDITIONAL COMMENTS
CT H 7/23: Stable R MCA territory infarction involving frontal lobe.CT angio neck/head 7/23: CTA NECK: R sided carotid endarterectomy. Atherosclerotic disease involving L carotid bifurcation w/ approximately 20-30% stenosis of L ICA origin by NASCET criteria. CTA HEAD: Branch occlusion of opercular branch of MCA. MRA Neck/ MRI brain 7/23: Acute infarct involving R anterior superior MCA territory w/o associated hemorrhage or mass effect. Suspicion of branch occlusion of opercular segment of MCA on R.CXR 7/22:(-)

## 2017-07-24 NOTE — OCCUPATIONAL THERAPY INITIAL EVALUATION ADULT - PERTINENT HX OF CURRENT PROBLEM, REHAB EVAL
76 yo F w/ PMH of HLD & COPD came into ED c/o of L facial droop & slurred speech. Pt denies any fevers, chills, sweats, headache, vision changes, CP, SOB, numbness or extremity weakness. Of note pt had a R CEA in May 2017. NIHSS: 1. RAVI: 1...see below

## 2017-07-24 NOTE — PROGRESS NOTE ADULT - ASSESSMENT
ASSESSMENT: 76 yo F with a PMH of HLD and COPD , status post right CEA in May 2017 Hospital who came into the ED complaining of left facial droop and slurred speech. As per daughter the patient was noted to have this left sided facial droop and slurred speech at 1 pm on 7/22/2017.Impression Right MCA territory ischemic infarct likely embolic in origin versus large artery atherosclerosis though CT angiogram does not show significant stenosis in the area of right internal carotid.    NEURO: neurologically without acute change,  permissive HTN with titration to normotension, titrate statin to LDL goal less than 70, MR imaging as noted above. Physical therapy/OT eval for home with home therapy.     ANTITHROMBOTIC THERAPY: ASA    PULMONARY: CXR clear, protecting airway, saturating well     CARDIOVASCULAR: check TTE, cardiac monitoring with no a fib noted, recommend outpatient cardiac follow up for consideration of ILR to screen for occult arrhythmia as possible source                            SBP goal: normotension     GASTROINTESTINAL: dysphagia screen  passed, on regular diet and tolerating well      Diet: regular     RENAL: BUN/Cr slightly elevated, encourage PO hydration, good urine output      Na Goal: Greater than 135     [place]:    HEMATOLOGY: H/H without acute change on previous labs, no active bleeding      DVT ppx: Heparin s.c [] LMWH [X] - change to heparin given slightly elevated Cr.    ID: afebrile, no leukocytosis, no si/sx of infection       DISPOSITION: Stable for d/c home with outpatient therapy and close outpatient follow up.  D/W Case management.       CORE MEASURES:        Admission NIHSS: 1     TPA: [] YES [x] NO      LDL/HDL:69/45     Depression Screen: 0     Statin Therapy:y     Dysphagia Screen: [x] PASS [] FAIL     Smoking [] YES [x] NO      Afib [] YES [x] NO     Stroke Education [x] YES [] NO

## 2017-07-25 VITALS
DIASTOLIC BLOOD PRESSURE: 81 MMHG | TEMPERATURE: 98 F | SYSTOLIC BLOOD PRESSURE: 179 MMHG | OXYGEN SATURATION: 92 % | HEART RATE: 90 BPM | RESPIRATION RATE: 18 BRPM

## 2017-07-25 LAB
ANION GAP SERPL CALC-SCNC: 19 MMOL/L — HIGH (ref 5–17)
BUN SERPL-MCNC: 11 MG/DL — SIGNIFICANT CHANGE UP (ref 7–23)
CALCIUM SERPL-MCNC: 8.4 MG/DL — SIGNIFICANT CHANGE UP (ref 8.4–10.5)
CHLORIDE SERPL-SCNC: 101 MMOL/L — SIGNIFICANT CHANGE UP (ref 96–108)
CO2 SERPL-SCNC: 21 MMOL/L — LOW (ref 22–31)
CREAT SERPL-MCNC: 1.27 MG/DL — SIGNIFICANT CHANGE UP (ref 0.5–1.3)
GLUCOSE SERPL-MCNC: 102 MG/DL — HIGH (ref 70–99)
HCT VFR BLD CALC: 34 % — LOW (ref 34.5–45)
HGB BLD-MCNC: 10.9 G/DL — LOW (ref 11.5–15.5)
MCHC RBC-ENTMCNC: 28.1 PG — SIGNIFICANT CHANGE UP (ref 27–34)
MCHC RBC-ENTMCNC: 32.1 GM/DL — SIGNIFICANT CHANGE UP (ref 32–36)
MCV RBC AUTO: 87.6 FL — SIGNIFICANT CHANGE UP (ref 80–100)
PLATELET # BLD AUTO: 339 K/UL — SIGNIFICANT CHANGE UP (ref 150–400)
POTASSIUM SERPL-MCNC: 3.5 MMOL/L — SIGNIFICANT CHANGE UP (ref 3.5–5.3)
POTASSIUM SERPL-SCNC: 3.5 MMOL/L — SIGNIFICANT CHANGE UP (ref 3.5–5.3)
RBC # BLD: 3.88 M/UL — SIGNIFICANT CHANGE UP (ref 3.8–5.2)
RBC # FLD: 15.3 % — HIGH (ref 10.3–14.5)
SODIUM SERPL-SCNC: 141 MMOL/L — SIGNIFICANT CHANGE UP (ref 135–145)
WBC # BLD: 6.3 K/UL — SIGNIFICANT CHANGE UP (ref 3.8–10.5)
WBC # FLD AUTO: 6.3 K/UL — SIGNIFICANT CHANGE UP (ref 3.8–10.5)

## 2017-07-25 PROCEDURE — 82330 ASSAY OF CALCIUM: CPT

## 2017-07-25 PROCEDURE — 97162 PT EVAL MOD COMPLEX 30 MIN: CPT

## 2017-07-25 PROCEDURE — 70544 MR ANGIOGRAPHY HEAD W/O DYE: CPT

## 2017-07-25 PROCEDURE — 85014 HEMATOCRIT: CPT

## 2017-07-25 PROCEDURE — 82550 ASSAY OF CK (CPK): CPT

## 2017-07-25 PROCEDURE — 85610 PROTHROMBIN TIME: CPT

## 2017-07-25 PROCEDURE — 70496 CT ANGIOGRAPHY HEAD: CPT

## 2017-07-25 PROCEDURE — 84132 ASSAY OF SERUM POTASSIUM: CPT

## 2017-07-25 PROCEDURE — 84484 ASSAY OF TROPONIN QUANT: CPT

## 2017-07-25 PROCEDURE — A9585: CPT

## 2017-07-25 PROCEDURE — 82803 BLOOD GASES ANY COMBINATION: CPT

## 2017-07-25 PROCEDURE — 85027 COMPLETE CBC AUTOMATED: CPT

## 2017-07-25 PROCEDURE — 95819 EEG AWAKE AND ASLEEP: CPT

## 2017-07-25 PROCEDURE — 93005 ELECTROCARDIOGRAM TRACING: CPT

## 2017-07-25 PROCEDURE — 80053 COMPREHEN METABOLIC PANEL: CPT

## 2017-07-25 PROCEDURE — 83605 ASSAY OF LACTIC ACID: CPT

## 2017-07-25 PROCEDURE — 99285 EMERGENCY DEPT VISIT HI MDM: CPT | Mod: 25

## 2017-07-25 PROCEDURE — 70450 CT HEAD/BRAIN W/O DYE: CPT

## 2017-07-25 PROCEDURE — 70548 MR ANGIOGRAPHY NECK W/DYE: CPT

## 2017-07-25 PROCEDURE — 85730 THROMBOPLASTIN TIME PARTIAL: CPT

## 2017-07-25 PROCEDURE — 71045 X-RAY EXAM CHEST 1 VIEW: CPT

## 2017-07-25 PROCEDURE — 84295 ASSAY OF SERUM SODIUM: CPT

## 2017-07-25 PROCEDURE — 86850 RBC ANTIBODY SCREEN: CPT

## 2017-07-25 PROCEDURE — 86901 BLOOD TYPING SEROLOGIC RH(D): CPT

## 2017-07-25 PROCEDURE — 97165 OT EVAL LOW COMPLEX 30 MIN: CPT

## 2017-07-25 PROCEDURE — 70498 CT ANGIOGRAPHY NECK: CPT

## 2017-07-25 PROCEDURE — 82947 ASSAY GLUCOSE BLOOD QUANT: CPT

## 2017-07-25 PROCEDURE — 82435 ASSAY OF BLOOD CHLORIDE: CPT

## 2017-07-25 PROCEDURE — 86900 BLOOD TYPING SEROLOGIC ABO: CPT

## 2017-07-25 PROCEDURE — 80048 BASIC METABOLIC PNL TOTAL CA: CPT

## 2017-07-25 PROCEDURE — 82553 CREATINE MB FRACTION: CPT

## 2017-07-25 PROCEDURE — 70551 MRI BRAIN STEM W/O DYE: CPT

## 2017-07-25 PROCEDURE — 83036 HEMOGLOBIN GLYCOSYLATED A1C: CPT

## 2017-07-25 PROCEDURE — 80061 LIPID PANEL: CPT

## 2017-07-25 PROCEDURE — 95957 EEG DIGITAL ANALYSIS: CPT

## 2017-07-25 PROCEDURE — 86870 RBC ANTIBODY IDENTIFICATION: CPT

## 2017-07-25 RX ORDER — AMLODIPINE BESYLATE 2.5 MG/1
1 TABLET ORAL
Qty: 30 | Refills: 0 | OUTPATIENT
Start: 2017-07-25

## 2017-07-25 RX ADMIN — Medication 81 MILLIGRAM(S): at 11:27

## 2017-07-25 RX ADMIN — PANTOPRAZOLE SODIUM 40 MILLIGRAM(S): 20 TABLET, DELAYED RELEASE ORAL at 06:41

## 2017-07-25 RX ADMIN — CLOPIDOGREL BISULFATE 75 MILLIGRAM(S): 75 TABLET, FILM COATED ORAL at 11:27

## 2017-07-25 NOTE — PROGRESS NOTE ADULT - ASSESSMENT
ASSESSMENT: 78 yo F with a PMH of HLD and COPD , status post right CEA in May 2017 Hospital who came into the ED complaining of left facial droop and slurred speech. As per daughter the patient was noted to have this left sided facial droop and slurred speech at 1 pm on 7/22/2017.Impression Right MCA territory ischemic infarct likely embolic in origin versus large artery atherosclerosis though CT angiogram does not show significant stenosis in the area of right internal carotid.    NEURO: neurologically without acute change,  permissive HTN with titration to normotension, titrate statin to LDL goal less than 70, MR imaging as noted above. Physical therapy/OT eval for home with home therapy.     ANTITHROMBOTIC THERAPY: ASA/plavix    PULMONARY: CXR clear, protecting airway, saturating well     CARDIOVASCULAR: check TTE, cardiac monitoring with no a fib noted, recommend outpatient cardiac follow up for consideration of ILR to screen for occult arrhythmia as possible source, Dr. Mantilla consult appreciated.                           SBP goal: normotension     GASTROINTESTINAL: dysphagia screen  passed, on regular diet and tolerating well      Diet: regular     RENAL: BUN/Cr slightly elevated but Cr. downtrending, encourage PO hydration, good urine output      Na Goal: Greater than 135     [place]:n    HEMATOLOGY: H/H without acute change on previous labs, no active bleeding      DVT ppx: Heparin s.c [] LMWH [x]     ID: afebrile, no leukocytosis, no si/sx of infection       DISPOSITION: Stable for d/c home with outpatient therapy and close outpatient follow up.  D/W Case management.       CORE MEASURES:        Admission NIHSS: 1     TPA: [] YES [x] NO      LDL/HDL:69/45     Depression Screen: 0     Statin Therapy:y     Dysphagia Screen: [x] PASS [] FAIL     Smoking [] YES [x] NO      Afib [] YES [x] NO     Stroke Education [x] YES [] NO

## 2017-07-25 NOTE — PROGRESS NOTE ADULT - SUBJECTIVE AND OBJECTIVE BOX
THE PATIENT WAS SEEN AND EXAMINED BY ME WITH THE HOUSESTAFF AND STROKE TEAM DURING MORNING ROUNDS.   HPI:  Pt is a 76 yo F with a PMH of HLD and COPD who came into the ED complaining of left facial droop and slurred speech. As per daughter the patient was noted to have this left sided facial droop and slurred speech at 1 pm day of admission. Pt denied any fevers, chills, sweats, headache, vision changes, CP, SOB, numbness or extremity weakness. Of note pt had a right CEA in May 2017. NIHSS: 1. RAVI: 1.     SUBJECTIVE: No events overnight.  No new neurologic complaints.      aspirin enteric coated 81 milliGRAM(s) Oral daily  enoxaparin Injectable 40 milliGRAM(s) SubCutaneous every 24 hours  atorvastatin 80 milliGRAM(s) Oral at bedtime  pantoprazole    Tablet 40 milliGRAM(s) Oral before breakfast  buDESOnide 160 MICROgram(s)/formoterol 4.5 MICROgram(s) Inhaler 2 Puff(s) Inhalation two times a day  clopidogrel Tablet 75 milliGRAM(s) Oral daily      PHYSICAL EXAM:   Vital Signs Last 24 Hrs  T(C): 36.9 (25 Jul 2017 12:38), Max: 37 (24 Jul 2017 21:09)  T(F): 98.4 (25 Jul 2017 12:38), Max: 98.6 (24 Jul 2017 21:09)  HR: 90 (25 Jul 2017 12:38) (90 - 103)  BP: 179/81 (25 Jul 2017 12:38) (137/73 - 181/75)  BP(mean): --  RR: 18 (25 Jul 2017 12:38) (17 - 18)  SpO2: 92% (25 Jul 2017 12:38) (92% - 96%)    General: No acute distress  HEENT: EOM intact, visual fields full  Abdomen: Soft, nontender, nondistended   Extremities: No edema    NEUROLOGICAL EXAM:  Mental status: Awake, alert, oriented x3, no aphasia, no neglect, normal memory   Cranial Nerves: mild left nasolabial fold asymmmetry, no nystagmus, no dysarthria, no dysphagia, tongue midline, shoulder shrug intact bilaterally.  Motor exam: moves all extremities well against gravity.  Sensation: Intact to light touch   Coordination/ Gait: No dysmetria, FELICIANO intact and symmetric bilaterally    LABS:                        10.9   6.30  )-----------( 339      ( 25 Jul 2017 07:42 )             34.0    07-25    141  |  101  |  11  ----------------------------<  102<H>  3.5   |  21<L>  |  1.27    Ca    8.4      25 Jul 2017 07:41      Hemoglobin A1C, Whole Blood: 5.2 % (07-24 @ 12:35)      IMAGING: Reviewed by me.     MRI Head w/o Cont (07.23.17)   1. Acute infarct involving the right anterior superior MCA territory   without associated hemorrhage or mass effect.  2. Suspicion of a ranch occlusion of the opercular segment of the MCA on   the right  CT Head No Cont 07.23.17   Stable right MCA territory infarction involving the frontal lobe.

## 2017-07-25 NOTE — PROGRESS NOTE ADULT - SUBJECTIVE AND OBJECTIVE BOX
Patient seen and evaluated  Full consult dictated    Assessment  1. Acute CVA  2. HTN      Plan  1. Aspirin, plavix, and statins  2. Initiate low dose norvasc  3. ILR as outpatient.      Thank you

## 2017-08-21 ENCOUNTER — NON-APPOINTMENT (OUTPATIENT)
Age: 77
End: 2017-08-21

## 2017-08-21 ENCOUNTER — APPOINTMENT (OUTPATIENT)
Dept: ELECTROPHYSIOLOGY | Facility: CLINIC | Age: 77
End: 2017-08-21
Payer: MEDICARE

## 2017-08-21 VITALS
HEIGHT: 64 IN | BODY MASS INDEX: 20.83 KG/M2 | OXYGEN SATURATION: 96 % | WEIGHT: 122 LBS | SYSTOLIC BLOOD PRESSURE: 144 MMHG | HEART RATE: 90 BPM | DIASTOLIC BLOOD PRESSURE: 83 MMHG

## 2017-08-21 PROCEDURE — 99205 OFFICE O/P NEW HI 60 MIN: CPT

## 2017-08-21 PROCEDURE — 93000 ELECTROCARDIOGRAM COMPLETE: CPT

## 2017-08-21 RX ORDER — AMLODIPINE BESYLATE 5 MG/1
5 TABLET ORAL
Refills: 0 | Status: ACTIVE | COMMUNITY

## 2017-08-21 RX ORDER — ONDANSETRON HYDROCHLORIDE 4 MG/1
4 TABLET, FILM COATED ORAL
Qty: 20 | Refills: 0 | Status: ACTIVE | COMMUNITY

## 2017-08-21 RX ORDER — ASPIRIN ENTERIC COATED TABLETS 81 MG 81 MG/1
81 TABLET, DELAYED RELEASE ORAL
Qty: 30 | Refills: 11 | Status: ACTIVE | COMMUNITY

## 2017-08-21 RX ORDER — PANTOPRAZOLE 40 MG/1
40 TABLET, DELAYED RELEASE ORAL
Qty: 30 | Refills: 0 | Status: ACTIVE | COMMUNITY

## 2017-09-07 ENCOUNTER — OUTPATIENT (OUTPATIENT)
Dept: OUTPATIENT SERVICES | Facility: HOSPITAL | Age: 77
LOS: 1 days | End: 2017-09-07
Payer: MEDICARE

## 2017-09-07 VITALS
RESPIRATION RATE: 14 BRPM | TEMPERATURE: 98 F | HEART RATE: 87 BPM | WEIGHT: 121.92 LBS | OXYGEN SATURATION: 97 % | SYSTOLIC BLOOD PRESSURE: 175 MMHG | DIASTOLIC BLOOD PRESSURE: 79 MMHG | HEIGHT: 64 IN

## 2017-09-07 DIAGNOSIS — R55 SYNCOPE AND COLLAPSE: ICD-10-CM

## 2017-09-07 PROCEDURE — 33282: CPT

## 2017-09-07 PROCEDURE — C1764: CPT

## 2017-09-07 RX ORDER — SODIUM CHLORIDE 9 MG/ML
3 INJECTION INTRAMUSCULAR; INTRAVENOUS; SUBCUTANEOUS EVERY 8 HOURS
Qty: 0 | Refills: 0 | Status: DISCONTINUED | OUTPATIENT
Start: 2017-09-07 | End: 2017-09-22

## 2017-09-07 RX ORDER — PANTOPRAZOLE SODIUM 20 MG/1
0 TABLET, DELAYED RELEASE ORAL
Qty: 0 | Refills: 0 | COMMUNITY

## 2017-09-07 RX ORDER — FLUTICASONE PROPIONATE AND SALMETEROL 50; 250 UG/1; UG/1
0 POWDER ORAL; RESPIRATORY (INHALATION)
Qty: 0 | Refills: 0 | COMMUNITY

## 2017-09-07 RX ORDER — ASPIRIN/CALCIUM CARB/MAGNESIUM 324 MG
0 TABLET ORAL
Qty: 0 | Refills: 0 | COMMUNITY

## 2017-09-07 RX ORDER — ONDANSETRON 8 MG/1
0 TABLET, FILM COATED ORAL
Qty: 0 | Refills: 0 | COMMUNITY

## 2017-09-07 RX ORDER — ATORVASTATIN CALCIUM 80 MG/1
0 TABLET, FILM COATED ORAL
Qty: 0 | Refills: 0 | COMMUNITY

## 2017-09-07 NOTE — H&P CARDIOLOGY - HISTORY OF PRESENT ILLNESS
76 yo female PMH carotid stenosis s/p CEA 5/2017, HTN, HLD, recent CVA post CEA presents today for ILR. Patient reports recent syncope and fall as well. Seen and evaluated by Dr. Tam, recommended ILR today.

## 2017-09-14 ENCOUNTER — APPOINTMENT (OUTPATIENT)
Dept: ELECTROPHYSIOLOGY | Facility: CLINIC | Age: 77
End: 2017-09-14
Payer: MEDICARE

## 2017-09-14 ENCOUNTER — NON-APPOINTMENT (OUTPATIENT)
Age: 77
End: 2017-09-14

## 2017-09-14 VITALS
SYSTOLIC BLOOD PRESSURE: 151 MMHG | BODY MASS INDEX: 21.17 KG/M2 | HEART RATE: 98 BPM | DIASTOLIC BLOOD PRESSURE: 78 MMHG | WEIGHT: 124 LBS | HEIGHT: 64 IN | OXYGEN SATURATION: 96 %

## 2017-09-14 PROCEDURE — 99024 POSTOP FOLLOW-UP VISIT: CPT

## 2017-10-17 ENCOUNTER — APPOINTMENT (OUTPATIENT)
Dept: ELECTROPHYSIOLOGY | Facility: CLINIC | Age: 77
End: 2017-10-17
Payer: MEDICARE

## 2017-10-17 PROCEDURE — 93298 REM INTERROG DEV EVAL SCRMS: CPT

## 2017-11-21 ENCOUNTER — APPOINTMENT (OUTPATIENT)
Dept: ELECTROPHYSIOLOGY | Facility: CLINIC | Age: 77
End: 2017-11-21
Payer: MEDICARE

## 2017-11-21 PROCEDURE — 93298 REM INTERROG DEV EVAL SCRMS: CPT

## 2017-12-27 ENCOUNTER — APPOINTMENT (OUTPATIENT)
Dept: ELECTROPHYSIOLOGY | Facility: CLINIC | Age: 77
End: 2017-12-27
Payer: MEDICARE

## 2017-12-27 ENCOUNTER — NON-APPOINTMENT (OUTPATIENT)
Age: 77
End: 2017-12-27

## 2017-12-27 VITALS
OXYGEN SATURATION: 97 % | BODY MASS INDEX: 20.49 KG/M2 | WEIGHT: 120 LBS | DIASTOLIC BLOOD PRESSURE: 76 MMHG | HEIGHT: 64 IN | HEART RATE: 89 BPM | SYSTOLIC BLOOD PRESSURE: 148 MMHG

## 2017-12-27 PROCEDURE — 93285 PRGRMG DEV EVAL SCRMS IP: CPT

## 2018-01-30 ENCOUNTER — APPOINTMENT (OUTPATIENT)
Dept: ELECTROPHYSIOLOGY | Facility: CLINIC | Age: 78
End: 2018-01-30
Payer: MEDICARE

## 2018-01-30 PROCEDURE — 93299: CPT

## 2018-01-30 PROCEDURE — 93298 REM INTERROG DEV EVAL SCRMS: CPT

## 2018-03-06 ENCOUNTER — APPOINTMENT (OUTPATIENT)
Dept: ELECTROPHYSIOLOGY | Facility: CLINIC | Age: 78
End: 2018-03-06
Payer: MEDICARE

## 2018-03-06 PROCEDURE — 93298 REM INTERROG DEV EVAL SCRMS: CPT

## 2018-04-10 ENCOUNTER — APPOINTMENT (OUTPATIENT)
Dept: ELECTROPHYSIOLOGY | Facility: CLINIC | Age: 78
End: 2018-04-10
Payer: MEDICARE

## 2018-04-10 VITALS
DIASTOLIC BLOOD PRESSURE: 72 MMHG | HEIGHT: 64 IN | OXYGEN SATURATION: 97 % | HEART RATE: 94 BPM | BODY MASS INDEX: 20.49 KG/M2 | WEIGHT: 120 LBS | SYSTOLIC BLOOD PRESSURE: 134 MMHG

## 2018-04-10 PROCEDURE — 93291 INTERROG DEV EVAL SCRMS IP: CPT

## 2018-04-16 NOTE — PHYSICAL THERAPY INITIAL EVALUATION ADULT - CRITERIA FOR SKILLED THERAPEUTIC INTERVENTIONS
Quality 111:Pneumonia Vaccination Status For Older Adults: Pneumococcal Vaccination Previously Received Detail Level: Generalized Quality 130: Documentation Of Current Medications In The Medical Record: Current Medications Documented Quality 110: Preventive Care And Screening: Influenza Immunization: Influenza Immunization Administered during Influenza season impairments found

## 2018-05-01 ENCOUNTER — APPOINTMENT (OUTPATIENT)
Dept: ELECTROPHYSIOLOGY | Facility: CLINIC | Age: 78
End: 2018-05-01
Payer: MEDICARE

## 2018-05-01 PROCEDURE — 93298 REM INTERROG DEV EVAL SCRMS: CPT

## 2018-06-02 ENCOUNTER — INPATIENT (INPATIENT)
Facility: HOSPITAL | Age: 78
LOS: 2 days | Discharge: HOME | DRG: 377 | End: 2018-06-05
Attending: HOSPITALIST | Admitting: INTERNAL MEDICINE
Payer: MEDICARE

## 2018-06-02 VITALS
HEART RATE: 104 BPM | SYSTOLIC BLOOD PRESSURE: 174 MMHG | OXYGEN SATURATION: 91 % | TEMPERATURE: 98 F | DIASTOLIC BLOOD PRESSURE: 84 MMHG | RESPIRATION RATE: 18 BRPM

## 2018-06-02 LAB
ALBUMIN SERPL ELPH-MCNC: 4.1 G/DL — SIGNIFICANT CHANGE UP (ref 3.3–5)
ALP SERPL-CCNC: 70 U/L — SIGNIFICANT CHANGE UP (ref 40–120)
ALT FLD-CCNC: 8 U/L — LOW (ref 10–45)
ANION GAP SERPL CALC-SCNC: 14 MMOL/L — SIGNIFICANT CHANGE UP (ref 5–17)
APTT BLD: 24.9 SEC — LOW (ref 27.5–37.4)
AST SERPL-CCNC: 17 U/L — SIGNIFICANT CHANGE UP (ref 10–40)
BASE EXCESS BLDV CALC-SCNC: 0.9 MMOL/L — SIGNIFICANT CHANGE UP (ref -2–2)
BASOPHILS # BLD AUTO: 0 K/UL — SIGNIFICANT CHANGE UP (ref 0–0.2)
BASOPHILS NFR BLD AUTO: 0 % — SIGNIFICANT CHANGE UP (ref 0–2)
BILIRUB SERPL-MCNC: 0.8 MG/DL — SIGNIFICANT CHANGE UP (ref 0.2–1.2)
BUN SERPL-MCNC: 20 MG/DL — SIGNIFICANT CHANGE UP (ref 7–23)
CA-I SERPL-SCNC: 1.23 MMOL/L — SIGNIFICANT CHANGE UP (ref 1.12–1.3)
CALCIUM SERPL-MCNC: 8.9 MG/DL — SIGNIFICANT CHANGE UP (ref 8.4–10.5)
CHLORIDE BLDV-SCNC: 105 MMOL/L — SIGNIFICANT CHANGE UP (ref 96–108)
CHLORIDE SERPL-SCNC: 103 MMOL/L — SIGNIFICANT CHANGE UP (ref 96–108)
CO2 BLDV-SCNC: 28 MMOL/L — SIGNIFICANT CHANGE UP (ref 22–30)
CO2 SERPL-SCNC: 24 MMOL/L — SIGNIFICANT CHANGE UP (ref 22–31)
CREAT SERPL-MCNC: 1.63 MG/DL — HIGH (ref 0.5–1.3)
EOSINOPHIL # BLD AUTO: 0.1 K/UL — SIGNIFICANT CHANGE UP (ref 0–0.5)
EOSINOPHIL NFR BLD AUTO: 1.1 % — SIGNIFICANT CHANGE UP (ref 0–6)
GAS PNL BLDV: 144 MMOL/L — SIGNIFICANT CHANGE UP (ref 136–145)
GAS PNL BLDV: SIGNIFICANT CHANGE UP
GAS PNL BLDV: SIGNIFICANT CHANGE UP
GLUCOSE BLDV-MCNC: 159 MG/DL — HIGH (ref 70–99)
GLUCOSE SERPL-MCNC: 163 MG/DL — HIGH (ref 70–99)
HCO3 BLDV-SCNC: 26 MMOL/L — SIGNIFICANT CHANGE UP (ref 21–29)
HCT VFR BLD CALC: 26.6 % — LOW (ref 34.5–45)
HCT VFR BLDA CALC: 27 % — LOW (ref 39–50)
HGB BLD CALC-MCNC: 8.6 G/DL — LOW (ref 11.5–15.5)
HGB BLD-MCNC: 8.5 G/DL — LOW (ref 11.5–15.5)
INR BLD: 1 RATIO — SIGNIFICANT CHANGE UP (ref 0.88–1.16)
LACTATE BLDV-MCNC: 1.2 MMOL/L — SIGNIFICANT CHANGE UP (ref 0.7–2)
LIDOCAIN IGE QN: 41 U/L — SIGNIFICANT CHANGE UP (ref 7–60)
LYMPHOCYTES # BLD AUTO: 1.6 K/UL — SIGNIFICANT CHANGE UP (ref 1–3.3)
LYMPHOCYTES # BLD AUTO: 18.7 % — SIGNIFICANT CHANGE UP (ref 13–44)
MCHC RBC-ENTMCNC: 25.2 PG — LOW (ref 27–34)
MCHC RBC-ENTMCNC: 31.9 GM/DL — LOW (ref 32–36)
MCV RBC AUTO: 78.9 FL — LOW (ref 80–100)
MONOCYTES # BLD AUTO: 0.5 K/UL — SIGNIFICANT CHANGE UP (ref 0–0.9)
MONOCYTES NFR BLD AUTO: 6.2 % — SIGNIFICANT CHANGE UP (ref 2–14)
NEUTROPHILS # BLD AUTO: 6.4 K/UL — SIGNIFICANT CHANGE UP (ref 1.8–7.4)
NEUTROPHILS NFR BLD AUTO: 74.1 % — SIGNIFICANT CHANGE UP (ref 43–77)
PCO2 BLDV: 50 MMHG — SIGNIFICANT CHANGE UP (ref 35–50)
PH BLDV: 7.34 — LOW (ref 7.35–7.45)
PLATELET # BLD AUTO: 359 K/UL — SIGNIFICANT CHANGE UP (ref 150–400)
PO2 BLDV: 59 MMHG — HIGH (ref 25–45)
POTASSIUM BLDV-SCNC: 3.9 MMOL/L — SIGNIFICANT CHANGE UP (ref 3.5–5)
POTASSIUM SERPL-MCNC: 3.8 MMOL/L — SIGNIFICANT CHANGE UP (ref 3.5–5.3)
POTASSIUM SERPL-SCNC: 3.8 MMOL/L — SIGNIFICANT CHANGE UP (ref 3.5–5.3)
PROT SERPL-MCNC: 7 G/DL — SIGNIFICANT CHANGE UP (ref 6–8.3)
PROTHROM AB SERPL-ACNC: 10.9 SEC — SIGNIFICANT CHANGE UP (ref 9.8–12.7)
RBC # BLD: 3.37 M/UL — LOW (ref 3.8–5.2)
RBC # FLD: 14.1 % — SIGNIFICANT CHANGE UP (ref 10.3–14.5)
SAO2 % BLDV: 88 % — SIGNIFICANT CHANGE UP (ref 67–88)
SODIUM SERPL-SCNC: 141 MMOL/L — SIGNIFICANT CHANGE UP (ref 135–145)
WBC # BLD: 8.6 K/UL — SIGNIFICANT CHANGE UP (ref 3.8–10.5)
WBC # FLD AUTO: 8.6 K/UL — SIGNIFICANT CHANGE UP (ref 3.8–10.5)

## 2018-06-02 PROCEDURE — 99285 EMERGENCY DEPT VISIT HI MDM: CPT

## 2018-06-02 PROCEDURE — 74174 CTA ABD&PLVS W/CONTRAST: CPT | Mod: 26

## 2018-06-02 PROCEDURE — 71275 CT ANGIOGRAPHY CHEST: CPT | Mod: 26

## 2018-06-02 RX ORDER — SODIUM CHLORIDE 9 MG/ML
500 INJECTION INTRAMUSCULAR; INTRAVENOUS; SUBCUTANEOUS ONCE
Qty: 0 | Refills: 0 | Status: COMPLETED | OUTPATIENT
Start: 2018-06-02 | End: 2018-06-02

## 2018-06-02 RX ORDER — ACETAMINOPHEN 500 MG
1000 TABLET ORAL ONCE
Qty: 0 | Refills: 0 | Status: COMPLETED | OUTPATIENT
Start: 2018-06-02 | End: 2018-06-02

## 2018-06-02 RX ORDER — SODIUM CHLORIDE 9 MG/ML
3 INJECTION INTRAMUSCULAR; INTRAVENOUS; SUBCUTANEOUS ONCE
Qty: 0 | Refills: 0 | Status: COMPLETED | OUTPATIENT
Start: 2018-06-02 | End: 2018-06-02

## 2018-06-02 RX ORDER — PANTOPRAZOLE SODIUM 20 MG/1
80 TABLET, DELAYED RELEASE ORAL ONCE
Qty: 0 | Refills: 0 | Status: COMPLETED | OUTPATIENT
Start: 2018-06-02 | End: 2018-06-02

## 2018-06-02 RX ORDER — ONDANSETRON 8 MG/1
4 TABLET, FILM COATED ORAL ONCE
Qty: 0 | Refills: 0 | Status: COMPLETED | OUTPATIENT
Start: 2018-06-02 | End: 2018-06-02

## 2018-06-02 RX ADMIN — SODIUM CHLORIDE 500 MILLILITER(S): 9 INJECTION INTRAMUSCULAR; INTRAVENOUS; SUBCUTANEOUS at 22:36

## 2018-06-02 RX ADMIN — ONDANSETRON 4 MILLIGRAM(S): 8 TABLET, FILM COATED ORAL at 21:53

## 2018-06-02 RX ADMIN — PANTOPRAZOLE SODIUM 80 MILLIGRAM(S): 20 TABLET, DELAYED RELEASE ORAL at 23:04

## 2018-06-02 RX ADMIN — SODIUM CHLORIDE 3 MILLILITER(S): 9 INJECTION INTRAMUSCULAR; INTRAVENOUS; SUBCUTANEOUS at 21:55

## 2018-06-02 RX ADMIN — Medication 400 MILLIGRAM(S): at 22:36

## 2018-06-02 RX ADMIN — Medication 1000 MILLIGRAM(S): at 23:04

## 2018-06-02 NOTE — ED PROVIDER NOTE - PHYSICAL EXAMINATION
Gen: NAD  Eyes:  sclerae white, no icterus  ENT: Moist mucous membranes. No exudates  Neck: supple, no LAD, mass or goiter, trachea midline  CV: RRR. Audible S1 and S2. No murmurs, rubs, gallops, S3, nor S4  Pulm: Clear to auscultation bilaterally. No wheezes, rales, or rhonchi  Abd: TTP b/l LE   Musculoskeletal:  No edema  Skin: no lesions or scars noted  Psych: mood good, affect full range and congruent with mood.  Neurologic: AAOx3 Gen: NAD  Eyes:  sclerae white, no icterus  ENT: Moist mucous membranes. No exudates  Neck: supple, no LAD, mass or goiter, trachea midline  CV: RRR. Audible S1 and S2. No murmurs, rubs, gallops, S3, nor S4  Pulm: Clear to auscultation bilaterally. No wheezes, rales, or rhonchi  Abd: TTP b/l LE   Rectal: Brown stool, guaic pos  Musculoskeletal:  No edema  Skin: no lesions or scars noted  Psych: mood good, affect full range and congruent with mood.  Neurologic: AAOx3 Gen: Mild distress, chronically ill, thin, emaciated  Eyes:  sclerae white, no icterus  ENT: Dry mucous membranes. No exudates  Neck: supple, no LAD, mass or goiter, trachea midline  CV: tachy, reg rhythm. Audible S1 and S2. No murmurs, rubs, gallops, S3, nor S4  Pulm: Clear to auscultation bilaterally. No wheezes, rales, or rhonchi  Abd: TTP b/l LE   Rectal: Brown stool, guaic pos  Musculoskeletal:  No edema  Skin: no lesions or scars noted  Psych: mood good, affect full range and congruent with mood.  Neurologic: AAOx3

## 2018-06-02 NOTE — ED ADULT NURSE NOTE - OBJECTIVE STATEMENT
pt c/o generalized weakness, +prod cough x 2-3 days, N/V started upon arrival to ED, some general abd discomfort. no fevers"

## 2018-06-02 NOTE — ED PROVIDER NOTE - SHIFT CHANGE DETAILS
Attending MD Rod: 77F with CVA on plavix, DM HTN HLD vasculopath, s/p , spiriva no home O2, cough, yellow sputum, increased fatigue, vomiting for 10hr, abd mild diffuse tenderness with distention, has stable splenic and thoracic AA, +FOB, Hb from 10.9 to 8.5, CTA C/A/P r/o retroperitoneal bleed, treating for GI bleed at this time, lower suspicion for PNA, ?diaphragmatic irritation, no need for transfusion now, mild tachy, pallor, do not think UTI, will need admission

## 2018-06-02 NOTE — ED PROVIDER NOTE - OBJECTIVE STATEMENT
Hx COPD, CVA, HTN, p/w nausea and generalized weakness since yesterday, also abdominal discomfort. Also endorses generalized dizziness. Also Hx COPD, CVA, HTN, p/w cough x 3-4 days, also nausea and generalized weakness since yesterday. Endorses generalized abdominal discomfort. no CP, SOB, fever, or chills. No diarrhea or constipation. No abdom surg. Does has hx of AAA. Hx COPD, CVA, HTN, p/w cough x 3-4 days, also nausea and generalized weakness since yesterday. Endorses generalized abdominal discomfort. no CP, SOB, fever, or chills. No diarrhea or constipation. No abdom surg. Does has hx of AAA.  primary care physician: Reba Geronimo Hx COPD, CVA, HTN, p/w cough x 3-4 days, also nausea and generalized weakness since yesterday. Endorses generalized abdominal discomfort. no CP, SOB, fever, or chills. No diarrhea or constipation. No abdom surg. Does has hx of AAA.    primary care physician: Reba Geronimo

## 2018-06-02 NOTE — ED PROVIDER NOTE - MEDICAL DECISION MAKING DETAILS
70 y F multiple medical problems as listed, most signficiantly CVA on eliquis and TAA and splenic artery aneurysms.  Cough, congestion, vomiting, malaise/weak x 2 days.  May represent upper respiratory tract infection/PNA but vomiting and abd exam (bloating and TTP diffusely) concerning for intra-abdominal process.  GIB on exam.  Requires CT-A C/A/P, protonix, gentle fluid hydration.  Low threshold to transfuse.  At this time patient does not meet sepsis criteria.  --BMM

## 2018-06-02 NOTE — ED PROVIDER NOTE - PMH
Cerebrovascular accident (CVA)    COPD (chronic obstructive pulmonary disease)    Dizziness    Former smoker    Gallstones    Hyperlipidemia    Hypertension    Stenosis of carotid artery  may 2017

## 2018-06-03 DIAGNOSIS — I71.9 AORTIC ANEURYSM OF UNSPECIFIED SITE, WITHOUT RUPTURE: ICD-10-CM

## 2018-06-03 DIAGNOSIS — Z98.890 OTHER SPECIFIED POSTPROCEDURAL STATES: Chronic | ICD-10-CM

## 2018-06-03 DIAGNOSIS — I63.9 CEREBRAL INFARCTION, UNSPECIFIED: ICD-10-CM

## 2018-06-03 DIAGNOSIS — R11.2 NAUSEA WITH VOMITING, UNSPECIFIED: ICD-10-CM

## 2018-06-03 DIAGNOSIS — K92.2 GASTROINTESTINAL HEMORRHAGE, UNSPECIFIED: ICD-10-CM

## 2018-06-03 DIAGNOSIS — I10 ESSENTIAL (PRIMARY) HYPERTENSION: ICD-10-CM

## 2018-06-03 DIAGNOSIS — Z29.9 ENCOUNTER FOR PROPHYLACTIC MEASURES, UNSPECIFIED: ICD-10-CM

## 2018-06-03 DIAGNOSIS — J44.9 CHRONIC OBSTRUCTIVE PULMONARY DISEASE, UNSPECIFIED: ICD-10-CM

## 2018-06-03 DIAGNOSIS — N17.9 ACUTE KIDNEY FAILURE, UNSPECIFIED: ICD-10-CM

## 2018-06-03 DIAGNOSIS — D64.9 ANEMIA, UNSPECIFIED: ICD-10-CM

## 2018-06-03 LAB
ALBUMIN SERPL ELPH-MCNC: 3.8 G/DL — SIGNIFICANT CHANGE UP (ref 3.3–5)
ALP SERPL-CCNC: 65 U/L — SIGNIFICANT CHANGE UP (ref 40–120)
ALT FLD-CCNC: 8 U/L — LOW (ref 10–45)
ANION GAP SERPL CALC-SCNC: 12 MMOL/L — SIGNIFICANT CHANGE UP (ref 5–17)
AST SERPL-CCNC: 12 U/L — SIGNIFICANT CHANGE UP (ref 10–40)
BILIRUB SERPL-MCNC: 0.8 MG/DL — SIGNIFICANT CHANGE UP (ref 0.2–1.2)
BLD GP AB SCN SERPL QL: POSITIVE — SIGNIFICANT CHANGE UP
BUN SERPL-MCNC: 18 MG/DL — SIGNIFICANT CHANGE UP (ref 7–23)
CALCIUM SERPL-MCNC: 8.7 MG/DL — SIGNIFICANT CHANGE UP (ref 8.4–10.5)
CHLORIDE SERPL-SCNC: 102 MMOL/L — SIGNIFICANT CHANGE UP (ref 96–108)
CK MB CFR SERPL CALC: 1 NG/ML — SIGNIFICANT CHANGE UP (ref 0–3.8)
CK SERPL-CCNC: 72 U/L — SIGNIFICANT CHANGE UP (ref 25–170)
CO2 SERPL-SCNC: 25 MMOL/L — SIGNIFICANT CHANGE UP (ref 22–31)
CREAT ?TM UR-MCNC: 28 MG/DL — SIGNIFICANT CHANGE UP
CREAT SERPL-MCNC: 1.46 MG/DL — HIGH (ref 0.5–1.3)
DAT POLY-SP REAG RBC QL: NEGATIVE — SIGNIFICANT CHANGE UP
GLUCOSE SERPL-MCNC: 107 MG/DL — HIGH (ref 70–99)
HBA1C BLD-MCNC: 5.7 % — HIGH (ref 4–5.6)
HCT VFR BLD CALC: 25.8 % — LOW (ref 34.5–45)
HGB BLD-MCNC: 8.2 G/DL — LOW (ref 11.5–15.5)
IRON SATN MFR SERPL: 15 UG/DL — LOW (ref 30–160)
IRON SATN MFR SERPL: 5 % — LOW (ref 14–50)
MCHC RBC-ENTMCNC: 25.3 PG — LOW (ref 27–34)
MCHC RBC-ENTMCNC: 32 GM/DL — SIGNIFICANT CHANGE UP (ref 32–36)
MCV RBC AUTO: 79.3 FL — LOW (ref 80–100)
PLATELET # BLD AUTO: 297 K/UL — SIGNIFICANT CHANGE UP (ref 150–400)
POTASSIUM SERPL-MCNC: 3.6 MMOL/L — SIGNIFICANT CHANGE UP (ref 3.5–5.3)
POTASSIUM SERPL-SCNC: 3.6 MMOL/L — SIGNIFICANT CHANGE UP (ref 3.5–5.3)
PROT SERPL-MCNC: 6.6 G/DL — SIGNIFICANT CHANGE UP (ref 6–8.3)
RBC # BLD: 3.25 M/UL — LOW (ref 3.8–5.2)
RBC # FLD: 14 % — SIGNIFICANT CHANGE UP (ref 10.3–14.5)
RH IG SCN BLD-IMP: NEGATIVE — SIGNIFICANT CHANGE UP
SODIUM SERPL-SCNC: 139 MMOL/L — SIGNIFICANT CHANGE UP (ref 135–145)
SODIUM UR-SCNC: 81 MMOL/L — SIGNIFICANT CHANGE UP
TIBC SERPL-MCNC: 303 UG/DL — SIGNIFICANT CHANGE UP (ref 220–430)
TROPONIN T SERPL-MCNC: <0.01 NG/ML — SIGNIFICANT CHANGE UP (ref 0–0.06)
TSH SERPL-MCNC: 0.98 UIU/ML — SIGNIFICANT CHANGE UP (ref 0.27–4.2)
UIBC SERPL-MCNC: 288 UG/DL — SIGNIFICANT CHANGE UP (ref 110–370)
WBC # BLD: 5.4 K/UL — SIGNIFICANT CHANGE UP (ref 3.8–10.5)
WBC # FLD AUTO: 5.4 K/UL — SIGNIFICANT CHANGE UP (ref 3.8–10.5)

## 2018-06-03 PROCEDURE — 12345: CPT | Mod: GC,NC

## 2018-06-03 PROCEDURE — 76770 US EXAM ABDO BACK WALL COMP: CPT | Mod: 26

## 2018-06-03 PROCEDURE — 86077 PHYS BLOOD BANK SERV XMATCH: CPT

## 2018-06-03 PROCEDURE — 99223 1ST HOSP IP/OBS HIGH 75: CPT | Mod: GC

## 2018-06-03 RX ORDER — IPRATROPIUM/ALBUTEROL SULFATE 18-103MCG
3 AEROSOL WITH ADAPTER (GRAM) INHALATION EVERY 6 HOURS
Qty: 0 | Refills: 0 | Status: DISCONTINUED | OUTPATIENT
Start: 2018-06-03 | End: 2018-06-03

## 2018-06-03 RX ORDER — IPRATROPIUM/ALBUTEROL SULFATE 18-103MCG
3 AEROSOL WITH ADAPTER (GRAM) INHALATION EVERY 6 HOURS
Qty: 0 | Refills: 0 | Status: DISCONTINUED | OUTPATIENT
Start: 2018-06-03 | End: 2018-06-04

## 2018-06-03 RX ORDER — ATORVASTATIN CALCIUM 80 MG/1
10 TABLET, FILM COATED ORAL AT BEDTIME
Qty: 0 | Refills: 0 | Status: DISCONTINUED | OUTPATIENT
Start: 2018-06-03 | End: 2018-06-05

## 2018-06-03 RX ORDER — PANTOPRAZOLE SODIUM 20 MG/1
40 TABLET, DELAYED RELEASE ORAL EVERY 12 HOURS
Qty: 0 | Refills: 0 | Status: DISCONTINUED | OUTPATIENT
Start: 2018-06-03 | End: 2018-06-04

## 2018-06-03 RX ORDER — AMLODIPINE BESYLATE 2.5 MG/1
5 TABLET ORAL DAILY
Qty: 0 | Refills: 0 | Status: DISCONTINUED | OUTPATIENT
Start: 2018-06-03 | End: 2018-06-05

## 2018-06-03 RX ORDER — SODIUM CHLORIDE 9 MG/ML
1000 INJECTION INTRAMUSCULAR; INTRAVENOUS; SUBCUTANEOUS
Qty: 0 | Refills: 0 | Status: DISCONTINUED | OUTPATIENT
Start: 2018-06-03 | End: 2018-06-04

## 2018-06-03 RX ORDER — ONDANSETRON 8 MG/1
4 TABLET, FILM COATED ORAL EVERY 8 HOURS
Qty: 0 | Refills: 0 | Status: DISCONTINUED | OUTPATIENT
Start: 2018-06-03 | End: 2018-06-05

## 2018-06-03 RX ORDER — BUDESONIDE AND FORMOTEROL FUMARATE DIHYDRATE 160; 4.5 UG/1; UG/1
2 AEROSOL RESPIRATORY (INHALATION)
Qty: 0 | Refills: 0 | Status: DISCONTINUED | OUTPATIENT
Start: 2018-06-03 | End: 2018-06-05

## 2018-06-03 RX ADMIN — Medication 3 MILLILITER(S): at 23:13

## 2018-06-03 RX ADMIN — ATORVASTATIN CALCIUM 10 MILLIGRAM(S): 80 TABLET, FILM COATED ORAL at 21:31

## 2018-06-03 RX ADMIN — Medication 40 MILLIGRAM(S): at 11:42

## 2018-06-03 RX ADMIN — Medication 3 MILLILITER(S): at 15:24

## 2018-06-03 RX ADMIN — PANTOPRAZOLE SODIUM 40 MILLIGRAM(S): 20 TABLET, DELAYED RELEASE ORAL at 06:59

## 2018-06-03 RX ADMIN — Medication 100 MILLIGRAM(S): at 06:59

## 2018-06-03 RX ADMIN — BUDESONIDE AND FORMOTEROL FUMARATE DIHYDRATE 2 PUFF(S): 160; 4.5 AEROSOL RESPIRATORY (INHALATION) at 15:24

## 2018-06-03 RX ADMIN — Medication 100 MILLIGRAM(S): at 23:13

## 2018-06-03 RX ADMIN — PANTOPRAZOLE SODIUM 40 MILLIGRAM(S): 20 TABLET, DELAYED RELEASE ORAL at 17:29

## 2018-06-03 RX ADMIN — Medication 100 MILLIGRAM(S): at 18:22

## 2018-06-03 RX ADMIN — AMLODIPINE BESYLATE 5 MILLIGRAM(S): 2.5 TABLET ORAL at 06:59

## 2018-06-03 RX ADMIN — Medication 3 MILLILITER(S): at 17:29

## 2018-06-03 RX ADMIN — BUDESONIDE AND FORMOTEROL FUMARATE DIHYDRATE 2 PUFF(S): 160; 4.5 AEROSOL RESPIRATORY (INHALATION) at 21:32

## 2018-06-03 NOTE — H&P ADULT - PROBLEM SELECTOR PLAN 5
C/w advair C/w advair  Cough may represent COPD exacerbation  Duonebs, Prednisone started. C/w home atorvastatin  ASA and plavix held 2/2 GIB Unclear etiology, CT unrevealing  C/w Zofran  Possible non-anginal equivalent? EKG NSR, no ST changes  RAQUEL - CE x 2 P

## 2018-06-03 NOTE — H&P ADULT - PROBLEM SELECTOR PLAN 2
Microcytic anemia, likely iron deficiency  F/u GI C/S  F/u TSh, B12, IRon, TSH  Anemia may be contributing to generalized fatigue. Protonix IV   IVF  GI cs in AM  Type and screen with morning labs

## 2018-06-03 NOTE — H&P ADULT - NSHPREVIEWOFSYSTEMS_GEN_ALL_CORE
REVIEW OF SYSTEMS    CONSTITUTIONAL:  see hpi  HEENT:  Eyes:  Denies visual loss, blurred vision, double vision or scleral icterus. Ears, Nose, Throat:  Denies hearing loss, sneezing, congestion, runny nose or sore throat.  SKIN:  Denies rash or itching.  CARDIOVASCULAR:  Denies chest pain, ANGIE  RESPIRATORY:  see hpi  GASTROINTESTINAL:  see hpi  GENITOURINARY:  Denies any hematuria, dysuria  NEUROLOGICAL:  Denies headache, dizziness, syncope, paralysis, ataxia, numbness or tingling in the extremities. No change in bowel or bladder control.  MUSCULOSKELETAL:  Denies muscle, back pain, joint pain or stiffness.  HEMATOLOGIC:  Denies anemia, bleeding or bruising.  LYMPHATICS:  Denies enlarged nodes.   PSYCHIATRIC:  Denies history of depression or anxiety.  ENDOCRINOLOGIC:  Denies reports of sweating, cold or heat intolerance. No polyuria or polydipsia.  ALLERGIES:  Denies history of asthma, hives, eczema or rhinitis. REVIEW OF SYSTEMS    CONSTITUTIONAL:  see hpi  HEENT:  Eyes:  Denies visual loss, blurred vision, double vision or scleral icterus. Ears, Nose, Throat:  Denies hearing loss, sneezing, congestion, runny nose or sore throat.  SKIN:  Denies rash or itching.  CARDIOVASCULAR:  Denies chest pain, LE edema  RESPIRATORY:  see hpi  GASTROINTESTINAL:  see hpi  GENITOURINARY:  Denies any hematuria, dysuria  NEUROLOGICAL:  Denies headache, dizziness, syncope, paralysis, ataxia, numbness or tingling in the extremities. No change in bowel or bladder control.  MUSCULOSKELETAL:  Denies muscle, back pain, joint pain or stiffness.  HEMATOLOGIC:  Denies anemia, bleeding or bruising.  LYMPHATICS:  Denies enlarged nodes.   PSYCHIATRIC:  Denies history of depression or anxiety.  ENDOCRINOLOGIC:  Denies reports of sweating, cold or heat intolerance. No polyuria or polydipsia.  ALLERGIES:  Denies history of asthma, hives, eczema or rhinitis.

## 2018-06-03 NOTE — H&P ADULT - ASSESSMENT
76 yo F PMH of COPD (not O2 dependent HTN, HLD, CVA (no residual deficits), thoracic aortic aneurysm who presents for dry cough, lethargy and possible GIB.

## 2018-06-03 NOTE — H&P ADULT - PROBLEM SELECTOR PLAN 3
C/w home amlodipine Renal US  Avoid nephrotoxic agents  Prerenal possible 2/2 dec PO intake. Prerenal possible 2/2 dec PO intake.  Check urine lytes  Renal US  Avoid nephrotoxic agents

## 2018-06-03 NOTE — H&P ADULT - HISTORY OF PRESENT ILLNESS
The patient is a 78 yo F PMH of COPD (not O2 dependent HTN, HLD, CVA (no residual deficits), thoracic aortic aneurysm who presents for dry cough, lethargy and possible GIB. The patient states that she has not had any diarrhea, melena, F chills, wheezing, SOB, CP, LOC, palpitations. Endorses generalized weakness which is worse when she exerts her self. Last Colonoscopy was over 15 years ago. The patient additionally endorses a 3-4 day history of a dry, non productive cough. States that in the ED she had one episode of NBNB vomiting. States that she fell a year ago and broke her ribs, which required hospitalization at [facility] for a carotid endarterectomy, and that ever since that fall she has had generalized weakness. Her PCP was scheduling her for an iron infusion for iron deficiency anemia. States she is well aware of the TAA and that her PCP has been following it.    Vital Signs Last 24 Hrs  T(C): 36.6 (03 Jun 2018 02:34), Max: 36.9 (02 Jun 2018 20:31)  T(F): 97.8 (03 Jun 2018 02:34), Max: 98.4 (02 Jun 2018 20:31)  HR: 85 (03 Jun 2018 02:34) (85 - 104)  BP: 139/63 (03 Jun 2018 02:34) (115/61 - 174/84)  BP(mean): --  RR: 18 (03 Jun 2018 02:34) (18 - 18)  SpO2: 99% (03 Jun 2018 02:34) (89% - 100%)      ED: [facility] +. CT CAP performed. The patient is a 76 yo F PMH of COPD (not O2 dependent HTN, HLD, CVA (no residual deficits), thoracic aortic aneurysm who presents for dry cough, lethargy. Endorses a 3-4 day history of a dry, non productive cough, patient denies wheezing. Takes Advair 1 puff daily. The patient states that she has not had any fever, chills, wheezing, nasal congestions, sore throat,  SOB, CP, LOC, palpitations. Endorses generalized weakness which is worse when she exerts her self. States that she fell a year ago and broke her ribs, which required hospitalization at [place] for a carotid endarterectomy, and that ever since her discharge, she has had generalized weakness. She endorses a history of anemia:  Her PCP was scheduling her for an iron infusion for iron deficiency anemia. States she has dark stool: but describes it as brown. Denies BRBPR. She does have intermittent nausea at home which she attributes to all the medications she takes States that in the ED she had one episode of NBNB vomiting, but thinks it was because she has not eaten much. Denies abdominal pain. Her PCP was scheduling her for an iron infusion for iron deficiency anemia. States she is well aware of the TAA and that her PCP has been following it.  Last Colonoscopy was over 15 years ago.     ED: [facility] +. CT CAP performed.

## 2018-06-03 NOTE — H&P ADULT - PROBLEM SELECTOR PLAN 4
C/w home atorvastatin  ASA and plavix held 2/2 GIB C/w home amlodipine Cough may represent mild COPD exacerbation  C/w advair (or therapeutic equivalent)  Duonebs PRN started.  Hold off on steroids given possible GIB

## 2018-06-03 NOTE — H&P ADULT - ATTENDING COMMENTS
The patient is a 78 yo F PMH of COPD (not O2 dependent HTN, HLD, CVA (no residual deficits), thoracic aortic aneurysm who presents for dry cough, lethargy. Confirmed and reviewed HPI and ROS. Annotations made above.   Vitals reviewed and physically examined patient at bedside. Patient in no apparent distress. Agree with resident's findings with exception of on my exam patient with mild diffuse expiratory wheezing. Patient in no respiratory distress. No appreciable ronchi on exam. Heart: RRR+ S1 S2 no appreciable murmurs.   Labs, imaging and EKG personally reviewed.  Microcytic anemia and R/O GI bleed: Microcytic anemia work up. T&S. GI consult in AM. Protonix IV BID. Trend CBC  Cough/COPD: optimize maintenance medications, monitor SpO2, check ambulatory and resting sat off supplemental O2. Supplemental O2, prn. Nebs, prn  Remainder of plan as detailed above.   Patient previously unknown to me and I was assigned to precept this case with housestaff resident, Dr. Altamirano. Primary day team to be assigned and  to assume care in AM.

## 2018-06-03 NOTE — H&P ADULT - PROBLEM SELECTOR PLAN 7
DVT: Bleeding, scds  Diet: Clears DASH  Dispo PT F/u with PCP for repeat imaging in 6 months. C/w home atorvastatin  ASA and plavix held 2/2 GIB

## 2018-06-03 NOTE — H&P ADULT - NSHPPHYSICALEXAM_GEN_ALL_CORE
Vital Signs Last 24 Hrs  T(C): 36.6 (03 Jun 2018 02:34), Max: 36.9 (02 Jun 2018 20:31)  T(F): 97.8 (03 Jun 2018 02:34), Max: 98.4 (02 Jun 2018 20:31)  HR: 85 (03 Jun 2018 02:34) (85 - 104)  BP: 139/63 (03 Jun 2018 02:34) (115/61 - 174/84)  BP(mean): --  RR: 18 (03 Jun 2018 02:34) (18 - 18)  SpO2: 99% (03 Jun 2018 02:34) (89% - 100%)    PHYSICAL EXAM:    GENERAL: Comfortable, no acute distress   HEAD:  Normocephalic, atraumatic  EYES: EOMI, PERRLA  HEENT: Moist mucous membranes  NECK: Supple, No JVD  NERVOUS SYSTEM:  CN 2-12 intact b/l. Alert & Oriented X3, Motor Strength 5/5 B/L upper and lower extremities. Sensation intact B/L  CHEST/LUNG: Clear to auscultation bilaterally  HEART: Regular rate and rhythm, no murmur   ABDOMEN: Soft, Nontender, Nondistended, Bowel sounds present  EXTREMITIES:   No clubbing, cyanosis, or edema  MUSCULOSKELETAL: No muscle tenderness, no joint tenderness  SKIN: warm and dry, no rash Vital Signs Last 24 Hrs  T(C): 36.6 (03 Jun 2018 02:34), Max: 36.9 (02 Jun 2018 20:31)  T(F): 97.8 (03 Jun 2018 02:34), Max: 98.4 (02 Jun 2018 20:31)  HR: 85 (03 Jun 2018 02:34) (85 - 104)  BP: 139/63 (03 Jun 2018 02:34) (115/61 - 174/84)  BP(mean): --  RR: 18 (03 Jun 2018 02:34) (18 - 18)  SpO2: 99% (03 Jun 2018 02:34) (89% - 100%)    PHYSICAL EXAM:    GENERAL: Comfortable, no acute distress   HEAD:  Normocephalic, atraumatic  EYES: EOMI, PERRLA  HEENT: Moist mucous membranes  NECK: Supple, No JVD  NERVOUS SYSTEM:  CN 2-12 intact b/l. Alert & Oriented X3, Motor Strength 5/5 B/L upper and lower extremities. Sensation intact B/L  CHEST/LUNG: Clear to auscultation bilaterally  HEART: Regular rate and rhythm + S1 S2, no murmur   ABDOMEN: Soft, Nontender, Nondistended, No guarding, No rigidity Bowel sounds present  EXTREMITIES:   No clubbing, cyanosis, or edema  MUSCULOSKELETAL: No muscle tenderness, no joint tenderness  SKIN: warm and dry, no rash

## 2018-06-03 NOTE — H&P ADULT - PROBLEM SELECTOR PROBLEM 6
Need for prophylactic measure Aortic aneurysm COPD (chronic obstructive pulmonary disease) Hypertension

## 2018-06-03 NOTE — H&P ADULT - PROBLEM SELECTOR PLAN 1
Protonix IV   IVF  GI cs in AM  Type and screen with morning labs Microcytic anemia, likely iron deficiency per patient's HPI. Anemia may be contributing to generalized fatigue. Can not rule out GI source in setting of positive Guiac  Check TSH, Folate, B12, Iron panel  GI consulted: Primary day team to f/u in AM Microcytic anemia, likely iron deficiency per patient's HPI. Anemia may be contributing to generalized fatigue. Can not rule out GI source in setting of positive Guiac  Check TSH, Folate, B12, Iron panel  GI consult pending: private: Primary day team to consult in AM

## 2018-06-03 NOTE — H&P ADULT - PROBLEM SELECTOR PLAN 6
DVT: Bleeding, scds  Diet: Clears DASH  Dispo PT F/u with PCP for repeat imaging in 6 months. C/w advair  Cough may represent COPD exacerbation  Duonebs, Prednisone started. C/w advair  Cough may represent COPD exacerbation  Duonebs PRN started.  Hold off on steroids given possible GIB C/w home amlodipine

## 2018-06-03 NOTE — H&P ADULT - PROBLEM SELECTOR PLAN 8
DVT: Bleeding, scds  Diet: Clears DASH  Dispo PT Unclear etiology, CT unrevealing  C/w Zofran  Possible non-anginal equivalent? EKG NSR, no ST changes  RAQUEL - CE x 2 P F/u with PCP for repeat imaging in 6 months.

## 2018-06-03 NOTE — H&P ADULT - NSHPLABSRESULTS_GEN_ALL_CORE
(06-02 @ 21:52)                      8.5  8.6 )-----------( 359                 26.6    Neutrophils = 6.4 (74.1%)  Lymphocytes = 1.6 (18.7%)  Eosinophils = 0.1 (1.1%)  Basophils = 0.0 (0.0%)  Monocytes = 0.5 (6.2%)  Bands = --%    06-02    141  |  103  |  20  ----------------------------<  163<H>  3.8   |  24  |  1.63<H>    Ca    8.9      02 Jun 2018 21:52    TPro  7.0  /  Alb  4.1  /  TBili  0.8  /  DBili  x   /  AST  17  /  ALT  8<L>  /  AlkPhos  70  06-02    ( 02 Jun 2018 21:52 )   PT: 10.9 sec;   INR: 1.00 ratio;       PTT:24.9 sec  CARDIAC MARKERS ( 02 Jun 2018 21:52 )  Trop <0.01 ng/mL / CK x     / CKMB x         Venous Blood Gas:  06-02 @ 21:52  7.34/50/59/26/88  VBG Lactate: 1.2      EKG NSR    < from: CT Angio Abdomen and Pelvis w/ IV Cont (06.02.18 @ 23:02) >    NTERPRETATION:  CLINICAL INFORMATION: History of thoracic aortic   aneurysm.    LUNGS AND LARGE AIRWAYS: Patent central airways. No pulmonary nodules.  PLEURA: No pleural effusion.  VESSELS: Severe calcified and noncalcified atherosclerosis of thoracic   aorta with penetrating ulcers. The ascending aorta is ectatic, measuring   up to 3.9 cm at the level of the main pulmonary artery. The descending   aorta is ectatic, measuring up to 3.7 cm proximal to the diaphragmatic   hiatus. No aortic dissection or intramural hematoma. Coronary   atherosclerosis.  HEART: Heart size is normal. Aortic valvular calcifications. No   pericardial effusion.  MEDIASTINUM AND TARA: Enlarged mediastinal lymph nodes, measuring up to   1.3 x 1.1 cm and a subcarinal node.  CHEST WALL AND LOWER NECK: Left chest wall device.    ABDOMEN AND PELVIS:    LIVER: Within normal limits.  BILE DUCTS: Prominent CBD is likely secondary to post cholecystectomy   state. No intrahepatic ductal dilatation.  GALLBLADDER: Cholecystectomy.  SPLEEN: Within normal limits.  PANCREAS: Within normal limits.  ADRENALS: Nodular thickening bilaterally.  KIDNEYS/URETERS: Within normal limits.    BLADDER: Within normal limits.  REPRODUCTIVE ORGANS: The uterus and adnexa are within normal limits.    BOWEL: No bowel obstruction. Appendix is normal.  PERITONEUM: No ascites.  VESSELS:Severe atherosclerosis of the abdominal aorta and branch   vessels. No abdominal aortic aneurysm, dissection, intramural hematoma.   The celiac access, superior mesenteric, and inferior mesenteric arteries   are patent.  RETROPERITONEUM: No lymphadenopathy.    ABDOMINAL WALL: Within normal limits.  BONES: Multiple chronic posterior right rib fractures. Degenerative   changes of the spine.    IMPRESSION:     Aneurysmal descending thoracic aorta measuring 3.8 cm without aortic   dissection or intramural hematoma.    No acute intrathoracic or intra-abdominal abnormality.        < end of copied text >    Personally reviewed EKG, labs and imaging. Personally reviewed labs and noted in detail below  (06-02 @ 21:52)                      8.5  8.6 )-----------( 359                 26.6    Neutrophils = 6.4 (74.1%)  Lymphocytes = 1.6 (18.7%)  Eosinophils = 0.1 (1.1%)  Basophils = 0.0 (0.0%)  Monocytes = 0.5 (6.2%)  Bands = --%    06-02    141  |  103  |  20  ----------------------------<  163<H>  3.8   |  24  |  1.63<H>    Ca    8.9      02 Jun 2018 21:52    TPro  7.0  /  Alb  4.1  /  TBili  0.8  /  DBili  x   /  AST  17  /  ALT  8<L>  /  AlkPhos  70  06-02    ( 02 Jun 2018 21:52 )   PT: 10.9 sec;   INR: 1.00 ratio;       PTT:24.9 sec  CARDIAC MARKERS ( 02 Jun 2018 21:52 )  Trop <0.01 ng/mL / CK x     / CKMB x         Venous Blood Gas:  06-02 @ 21:52  7.34/50/59/26/88  VBG Lactate: 1.2      Personally reviewed EKG NSR, no appreciable ST elevations    < from: CT Angio Abdomen and Pelvis w/ IV Cont (06.02.18 @ 23:02) >    NTERPRETATION:  CLINICAL INFORMATION: History of thoracic aortic   aneurysm.    LUNGS AND LARGE AIRWAYS: Patent central airways. No pulmonary nodules.  PLEURA: No pleural effusion.  VESSELS: Severe calcified and noncalcified atherosclerosis of thoracic   aorta with penetrating ulcers. The ascending aorta is ectatic, measuring   up to 3.9 cm at the level of the main pulmonary artery. The descending   aorta is ectatic, measuring up to 3.7 cm proximal to the diaphragmatic   hiatus. No aortic dissection or intramural hematoma. Coronary   atherosclerosis.  HEART: Heart size is normal. Aortic valvular calcifications. No   pericardial effusion.  MEDIASTINUM AND TARA: Enlarged mediastinal lymph nodes, measuring up to   1.3 x 1.1 cm and a subcarinal node.  CHEST WALL AND LOWER NECK: Left chest wall device.    ABDOMEN AND PELVIS:    LIVER: Within normal limits.  BILE DUCTS: Prominent CBD is likely secondary to post cholecystectomy   state. No intrahepatic ductal dilatation.  GALLBLADDER: Cholecystectomy.  SPLEEN: Within normal limits.  PANCREAS: Within normal limits.  ADRENALS: Nodular thickening bilaterally.  KIDNEYS/URETERS: Within normal limits.    BLADDER: Within normal limits.  REPRODUCTIVE ORGANS: The uterus and adnexa are within normal limits.    BOWEL: No bowel obstruction. Appendix is normal.  PERITONEUM: No ascites.  VESSELS:Severe atherosclerosis of the abdominal aorta and branch   vessels. No abdominal aortic aneurysm, dissection, intramural hematoma.   The celiac access, superior mesenteric, and inferior mesenteric arteries   are patent.  RETROPERITONEUM: No lymphadenopathy.    ABDOMINAL WALL: Within normal limits.  BONES: Multiple chronic posterior right rib fractures. Degenerative   changes of the spine.    IMPRESSION:     Aneurysmal descending thoracic aorta measuring 3.8 cm without aortic   dissection or intramural hematoma.    No acute intrathoracic or intra-abdominal abnormality.    < end of copied text >

## 2018-06-04 ENCOUNTER — TRANSCRIPTION ENCOUNTER (OUTPATIENT)
Age: 78
End: 2018-06-04

## 2018-06-04 DIAGNOSIS — R59.0 LOCALIZED ENLARGED LYMPH NODES: ICD-10-CM

## 2018-06-04 LAB
ANION GAP SERPL CALC-SCNC: 14 MMOL/L — SIGNIFICANT CHANGE UP (ref 5–17)
BUN SERPL-MCNC: 19 MG/DL — SIGNIFICANT CHANGE UP (ref 7–23)
CALCIUM SERPL-MCNC: 8.7 MG/DL — SIGNIFICANT CHANGE UP (ref 8.4–10.5)
CHLORIDE SERPL-SCNC: 103 MMOL/L — SIGNIFICANT CHANGE UP (ref 96–108)
CO2 SERPL-SCNC: 24 MMOL/L — SIGNIFICANT CHANGE UP (ref 22–31)
CREAT SERPL-MCNC: 1.51 MG/DL — HIGH (ref 0.5–1.3)
FERRITIN SERPL-MCNC: 14 NG/ML — LOW (ref 15–150)
GLUCOSE SERPL-MCNC: 150 MG/DL — HIGH (ref 70–99)
HAPTOGLOB SERPL-MCNC: 237 MG/DL — HIGH (ref 34–200)
HCT VFR BLD CALC: 24.7 % — LOW (ref 34.5–45)
HCT VFR BLD CALC: 25.8 % — LOW (ref 34.5–45)
HGB BLD-MCNC: 7.9 G/DL — LOW (ref 11.5–15.5)
HGB BLD-MCNC: 8.3 G/DL — LOW (ref 11.5–15.5)
LDH SERPL L TO P-CCNC: 160 U/L — SIGNIFICANT CHANGE UP (ref 50–242)
LDH SERPL L TO P-CCNC: 306 U/L — HIGH (ref 50–242)
MAGNESIUM SERPL-MCNC: 2 MG/DL — SIGNIFICANT CHANGE UP (ref 1.6–2.6)
MCHC RBC-ENTMCNC: 25.3 PG — LOW (ref 27–34)
MCHC RBC-ENTMCNC: 25.6 PG — LOW (ref 27–34)
MCHC RBC-ENTMCNC: 32 GM/DL — SIGNIFICANT CHANGE UP (ref 32–36)
MCHC RBC-ENTMCNC: 32.1 GM/DL — SIGNIFICANT CHANGE UP (ref 32–36)
MCV RBC AUTO: 79.1 FL — LOW (ref 80–100)
MCV RBC AUTO: 79.7 FL — LOW (ref 80–100)
PHOSPHATE SERPL-MCNC: 3.2 MG/DL — SIGNIFICANT CHANGE UP (ref 2.5–4.5)
PLATELET # BLD AUTO: 323 K/UL — SIGNIFICANT CHANGE UP (ref 150–400)
PLATELET # BLD AUTO: 338 K/UL — SIGNIFICANT CHANGE UP (ref 150–400)
POTASSIUM SERPL-MCNC: 3.4 MMOL/L — LOW (ref 3.5–5.3)
POTASSIUM SERPL-SCNC: 3.4 MMOL/L — LOW (ref 3.5–5.3)
RBC # BLD: 3.12 M/UL — LOW (ref 3.8–5.2)
RBC # BLD: 3.16 M/UL — LOW (ref 3.8–5.2)
RBC # BLD: 3.24 M/UL — LOW (ref 3.8–5.2)
RBC # FLD: 14.1 % — SIGNIFICANT CHANGE UP (ref 10.3–14.5)
RBC # FLD: 14.5 % — SIGNIFICANT CHANGE UP (ref 10.3–14.5)
RETICS #: 67.1 K/UL — SIGNIFICANT CHANGE UP (ref 25–125)
RETICS/RBC NFR: 2.1 % — SIGNIFICANT CHANGE UP (ref 0.5–2.5)
SODIUM SERPL-SCNC: 141 MMOL/L — SIGNIFICANT CHANGE UP (ref 135–145)
URATE SERPL-MCNC: 6.2 MG/DL — SIGNIFICANT CHANGE UP (ref 2.5–7)
WBC # BLD: 5.8 K/UL — SIGNIFICANT CHANGE UP (ref 3.8–10.5)
WBC # BLD: 8.2 K/UL — SIGNIFICANT CHANGE UP (ref 3.8–10.5)
WBC # FLD AUTO: 5.8 K/UL — SIGNIFICANT CHANGE UP (ref 3.8–10.5)
WBC # FLD AUTO: 8.2 K/UL — SIGNIFICANT CHANGE UP (ref 3.8–10.5)

## 2018-06-04 PROCEDURE — 93010 ELECTROCARDIOGRAM REPORT: CPT

## 2018-06-04 PROCEDURE — 99233 SBSQ HOSP IP/OBS HIGH 50: CPT

## 2018-06-04 RX ORDER — POTASSIUM CHLORIDE 20 MEQ
20 PACKET (EA) ORAL
Qty: 0 | Refills: 0 | Status: DISCONTINUED | OUTPATIENT
Start: 2018-06-04 | End: 2018-06-04

## 2018-06-04 RX ORDER — PANTOPRAZOLE SODIUM 20 MG/1
40 TABLET, DELAYED RELEASE ORAL
Qty: 0 | Refills: 0 | Status: DISCONTINUED | OUTPATIENT
Start: 2018-06-04 | End: 2018-06-05

## 2018-06-04 RX ORDER — FERROUS SULFATE 325(65) MG
325 TABLET ORAL DAILY
Qty: 0 | Refills: 0 | Status: DISCONTINUED | OUTPATIENT
Start: 2018-06-04 | End: 2018-06-05

## 2018-06-04 RX ORDER — POTASSIUM CHLORIDE 20 MEQ
20 PACKET (EA) ORAL ONCE
Qty: 0 | Refills: 0 | Status: COMPLETED | OUTPATIENT
Start: 2018-06-04 | End: 2018-06-04

## 2018-06-04 RX ORDER — POTASSIUM CHLORIDE 20 MEQ
40 PACKET (EA) ORAL ONCE
Qty: 0 | Refills: 0 | Status: DISCONTINUED | OUTPATIENT
Start: 2018-06-04 | End: 2018-06-04

## 2018-06-04 RX ORDER — IPRATROPIUM/ALBUTEROL SULFATE 18-103MCG
3 AEROSOL WITH ADAPTER (GRAM) INHALATION EVERY 6 HOURS
Qty: 0 | Refills: 0 | Status: DISCONTINUED | OUTPATIENT
Start: 2018-06-04 | End: 2018-06-05

## 2018-06-04 RX ADMIN — AMLODIPINE BESYLATE 5 MILLIGRAM(S): 2.5 TABLET ORAL at 05:20

## 2018-06-04 RX ADMIN — BUDESONIDE AND FORMOTEROL FUMARATE DIHYDRATE 2 PUFF(S): 160; 4.5 AEROSOL RESPIRATORY (INHALATION) at 08:37

## 2018-06-04 RX ADMIN — Medication 100 MILLIGRAM(S): at 05:20

## 2018-06-04 RX ADMIN — Medication 20 MILLIEQUIVALENT(S): at 08:22

## 2018-06-04 RX ADMIN — ATORVASTATIN CALCIUM 10 MILLIGRAM(S): 80 TABLET, FILM COATED ORAL at 21:35

## 2018-06-04 RX ADMIN — Medication 100 MILLIGRAM(S): at 12:39

## 2018-06-04 RX ADMIN — Medication 20 MILLIEQUIVALENT(S): at 12:38

## 2018-06-04 RX ADMIN — BUDESONIDE AND FORMOTEROL FUMARATE DIHYDRATE 2 PUFF(S): 160; 4.5 AEROSOL RESPIRATORY (INHALATION) at 21:35

## 2018-06-04 RX ADMIN — PANTOPRAZOLE SODIUM 40 MILLIGRAM(S): 20 TABLET, DELAYED RELEASE ORAL at 05:20

## 2018-06-04 RX ADMIN — Medication 3 MILLILITER(S): at 12:38

## 2018-06-04 RX ADMIN — Medication 40 MILLIGRAM(S): at 05:20

## 2018-06-04 RX ADMIN — Medication 100 MILLIGRAM(S): at 21:35

## 2018-06-04 RX ADMIN — Medication 3 MILLILITER(S): at 05:19

## 2018-06-04 NOTE — PHYSICAL THERAPY INITIAL EVALUATION ADULT - ADDITIONAL COMMENTS
Pt lives alone in private home (states spouse passed away in October) with 3 stairs to enter (+) B HR, bedroom/bathroom on 1st floor. Pt owns cane, walker, w/c.

## 2018-06-04 NOTE — DISCHARGE NOTE ADULT - MEDICATION SUMMARY - MEDICATIONS TO TAKE
I will START or STAY ON the medications listed below when I get home from the hospital:    predniSONE 20 mg oral tablet  -- 2 tab(s) by mouth once a day  -- Indication: For COPD (chronic obstructive pulmonary disease)    Ecotrin  -- 81 milligram(s) by mouth once a day  -- Indication: For Cerebrovascular accident (CVA)    ondansetron 4 mg oral tablet  -- 1 tab(s) by mouth every 8 hours, As Needed  -- Indication: For Nausea & vomiting    atorvastatin 10 mg oral tablet  -- 1 tab(s) by mouth once a day  -- Indication: For Hyperlipidemia    Advair Diskus 250 mcg-50 mcg inhalation powder  -- 2 puff(s) inhaled 2 times a day  -- Indication: For COPD (chronic obstructive pulmonary disease)    amLODIPine 5 mg oral tablet  -- 1 tab(s) by mouth once a day  -- Indication: For Essential hypertension    pantoprazole 40 mg oral delayed release tablet  -- 1 tab(s) by mouth once a day  -- Indication: For GI bleed I will START or STAY ON the medications listed below when I get home from the hospital:    predniSONE 20 mg oral tablet  -- 2 tab(s) by mouth once a day  -- Indication: For Chronic obstructive pulmonary disease with acute exacerbation    Ecotrin  -- 81 milligram(s) by mouth once a day  -- Indication: For Cerebrovascular accident (CVA)    ondansetron 4 mg oral tablet  -- 1 tab(s) by mouth every 8 hours, As Needed  -- Indication: For Nausea & vomiting    atorvastatin 10 mg oral tablet  -- 1 tab(s) by mouth once a day  -- Indication: For Hyperlipidemia    Advair Diskus 250 mcg-50 mcg inhalation powder  -- 2 puff(s) inhaled 2 times a day  -- Indication: For COPD (chronic obstructive pulmonary disease)    amLODIPine 5 mg oral tablet  -- 1 tab(s) by mouth once a day  -- Indication: For Essential hypertension    pantoprazole 40 mg oral delayed release tablet  -- 1 tab(s) by mouth once a day  -- Indication: For GI bleed

## 2018-06-04 NOTE — PHYSICAL THERAPY INITIAL EVALUATION ADULT - PLANNED THERAPY INTERVENTIONS, PT EVAL
transfer training/gait training/balance training/GOAL: Pt will negotiate 3 stairs with 1 HR and step to pattern with independence in 2 weeks.

## 2018-06-04 NOTE — DISCHARGE NOTE ADULT - SECONDARY DIAGNOSIS.
Mediastinal lymphadenopathy Chronic obstructive pulmonary disease with acute exacerbation Iron deficiency anemia due to chronic blood loss Thoracic aortic aneurysm without rupture NÉSTOR (acute kidney injury) Essential hypertension

## 2018-06-04 NOTE — PHYSICAL THERAPY INITIAL EVALUATION ADULT - DISCHARGE DISPOSITION, PT EVAL
home w/ home PT/Home with home PT for gait, balance, & strength training and to return pt to baseline functional mobility status. Rolling walker with ambulation (pt owns). Supervision with mobility skills at this time. **Pt cleared for d/c home from PT perspective at this time.

## 2018-06-04 NOTE — DISCHARGE NOTE ADULT - PLAN OF CARE
Your aneurysm is stable in size. Please follow up with your cardiologist and PCP. Your kidney numbers were slightly elevated, but stable during admission. A kidney ultrasound showed possible non-specific damage. Please follow up with your PCP for further management. Your blood pressure was controlled. Continue amlodipine. Remain asymptomatic You were evaluated by Dr. Car (GI) while in the hospital. Your blood counts remained stable.  Please follow up with hematology on 6/11 for iron infusion and GI next week for possible EGD and colonoscopy. Your CT scan showed some enlarged lymph nodes in your mediastinum. This is likely just an incidental finding. This was discussed with your PCP, Dr. Geronimo. Please follow up with her next week. You will likely need a repeat CT scan in 1-2 months. Your cough and shortness of breath improved while hospitalized.  Please continue prednisone 40 mg until Thursday, 6/7. Your blood counts remained stable while inpatient. Please follow up with hematology and GI.

## 2018-06-04 NOTE — PROGRESS NOTE ADULT - PROBLEM SELECTOR PLAN 9
F/u with PCP for repeat imaging in 6 months. pt states that she Dr. Lyles follows her know aneurysm.

## 2018-06-04 NOTE — DISCHARGE NOTE ADULT - PATIENT PORTAL LINK FT
You can access the [facility] Patient Portal, offered by [facility], by registering with the following website: [email or web address]

## 2018-06-04 NOTE — PROGRESS NOTE ADULT - ATTENDING COMMENTS
Plan d/w patient at bedside, patient's sister, and NP (Amira).    Dispo is home w/ PT.     Manuel Cameron M.D.  Hospitalist  Pager: [phone]

## 2018-06-04 NOTE — DISCHARGE NOTE ADULT - CARE PROVIDER_API CALL
Reba Geronimo), Internal Medicine  [address]  [place] [address]  Phone: [phone]  Fax: [phone] Reba Geronimo), Internal Medicine  [address]  [address]  [place] [address]  Phone: [phone]  Fax: [phone]    Zahraa Mantilla (DO), Cardiology; Internal Medicine  [address]  [address]  [place] [address]  Phone: [phone]  Fax: [phone]    Chaparro Lyles), Gastroenterology  [address]  [address]  [place] [address]  Phone: [phone]  Fax: [phone]

## 2018-06-04 NOTE — DISCHARGE NOTE ADULT - CARE PROVIDERS DIRECT ADDRESSES
,DirectAddress_Unknown ,DirectAddress_Unknown,DirectAddress_Unknown,[email or web address]

## 2018-06-04 NOTE — DISCHARGE NOTE ADULT - HOME CARE AGENCY
[facility] [phone]  Visiting nurse and physical therapy, declined aide assessment. [facility] [phone] - Visiting nurse to call to arrange home care visit for start of care 1-2 days after discharge. Please call home care agency with any questions or concerns.

## 2018-06-04 NOTE — PHYSICAL THERAPY INITIAL EVALUATION ADULT - PERTINENT HX OF CURRENT PROBLEM, REHAB EVAL
76 yo F PMH of COPD (not O2 dependent HTN, HLD, CVA (no residual deficits), thoracic aortic aneurysm who presents for dry cough, lethargy and possible GIB. 76 yo F PMH of COPD (not O2 dependent) HTN, HLD, CVA (no residual deficits), thoracic aortic aneurysm who presents for dry cough, lethargy and possible GIB. CT angio chest (6/2): aneurysmal descending thoracic aorta measuring 3.8 cm without aortic dissection.

## 2018-06-04 NOTE — DISCHARGE NOTE ADULT - HOSPITAL COURSE
ADMISSION    76 yo F PMH of COPD (not O2 dependent HTN, HLD, CVA (no residual deficits), thoracic aortic aneurysm who presents for dry cough, lethargy. Endorses a 3-4 day history of a dry, non productive cough, patient denies wheezing. Takes Advair 1 puff daily. The patient states that she has not had any fever, chills, wheezing, nasal congestions, sore throat,  SOB, CP, LOC, palpitations. Endorses generalized weakness which is worse when she exerts her self. States that she fell a year ago and broke her ribs, which required hospitalization at [facility] for a carotid endarterectomy, and that ever since her discharge, she has had generalized weakness. She endorses a history of anemia:  Her PCP was scheduling her for an iron infusion for iron deficiency anemia. States she has dark stool: but describes it as brown. Denies BRBPR. She does have intermittent nausea at home which she attributes to all the medications she takes States that in the ED she had one episode of NBNB vomiting, but thinks it was because she has not eaten much. Denies abdominal pain. Her PCP was scheduling her for an iron infusion for iron deficiency anemia. States she is well aware of the TAA and that her PCP has been following it.  Last Colonoscopy was over 15 years ago.     ED: [facility] +. CT CAP performed.     HOSPITAL COURSE    Patient was admitted to telemetry for further management. For COPD, patient was placed on 5 day course of prednisone with improvement in symptoms. She was transitioned from ATC DuoNebs to prn without complication.     GI was consulted for chronic blood loss anemia. Her H/H remained stable, with Hgb 9 on day of D/C (was 9 one month ago as outpatient). As per GI, will perform non-urgent EGD and colonoscopy as outpatient next week after plavix is held. She did not require transfusion while inpatient. Will f/u with heme on 6/11 for iron infusion. ASA will be continued at D/C.    Patient had no events on telemetry during admission.    Patient w/ Cr of 1.5 on admission, higher than last year. Suspect possible progression to CKD III. U/S kidney showed some abnormality of cortex c/w NÉSTOR or CKD.     CT C/A/P showed stable 3.7 cm thoracic aortic aneurysm. Also w/ incidental finding of 1.1-1.3 cm mediastinal lymph nodes. No evidence of retroperitoneal, axillary, cervical, or inguinal LAD. No masses or nodules. No B symptoms and LDH WNL. Low suspicion for underlying malignancy. d/w pulm and patient's PCP; will monitor w/ f/u CT chest in 1-2 months as outpatient.    On day of D/C, patient's condition had improved and she was stable for D/C. She will f/u with PCP, GI, heme, and cardiology. Case was d/w GI, PCP (Dr. Geronimo) and cardiologist (Dr. Mantilla) prior to D/C.

## 2018-06-04 NOTE — DISCHARGE NOTE ADULT - CARE PLAN
Principal Discharge DX:	GI bleed  Secondary Diagnosis:	Mediastinal lymphadenopathy  Secondary Diagnosis:	Chronic obstructive pulmonary disease with acute exacerbation  Secondary Diagnosis:	Iron deficiency anemia due to chronic blood loss  Secondary Diagnosis:	Thoracic aortic aneurysm without rupture  Secondary Diagnosis:	NÉSTOR (acute kidney injury)  Secondary Diagnosis:	Essential hypertension Principal Discharge DX:	GI bleed  Goal:	Remain asymptomatic  Assessment and plan of treatment:	You were evaluated by Dr. Car (GI) while in the hospital. Your blood counts remained stable.  Please follow up with hematology on 6/11 for iron infusion and GI next week for possible EGD and colonoscopy.  Secondary Diagnosis:	Mediastinal lymphadenopathy  Assessment and plan of treatment:	Your CT scan showed some enlarged lymph nodes in your mediastinum. This is likely just an incidental finding. This was discussed with your PCP, Dr. Geronimo. Please follow up with her next week. You will likely need a repeat CT scan in 1-2 months.  Secondary Diagnosis:	Chronic obstructive pulmonary disease with acute exacerbation  Assessment and plan of treatment:	Your cough and shortness of breath improved while hospitalized.  Please continue prednisone 40 mg until Thursday, 6/7.  Secondary Diagnosis:	Iron deficiency anemia due to chronic blood loss  Assessment and plan of treatment:	Your blood counts remained stable while inpatient. Please follow up with hematology and GI.  Secondary Diagnosis:	Thoracic aortic aneurysm without rupture  Assessment and plan of treatment:	Your aneurysm is stable in size. Please follow up with your cardiologist and PCP.  Secondary Diagnosis:	NÉSTOR (acute kidney injury)  Assessment and plan of treatment:	Your kidney numbers were slightly elevated, but stable during admission. A kidney ultrasound showed possible non-specific damage. Please follow up with your PCP for further management.  Secondary Diagnosis:	Essential hypertension  Assessment and plan of treatment:	Your blood pressure was controlled. Continue amlodipine.

## 2018-06-04 NOTE — CHART NOTE - NSCHARTNOTEFT_GEN_A_CORE
Notified by RN for 4 beats WCT on telemetry, no prior hx of WCTs. Patient seen and examined at bedside, in NAD. Patient was awake, lying at bed at time of occurrence, asymptomatic and without complaints. Denies CP, palpitations, SOB, dizziness, syncope.     Vital Signs Last 24 Hrs  T(C): 36.9 (04 Jun 2018 03:37), Max: 37.1 (03 Jun 2018 12:28)  T(F): 98.4 (04 Jun 2018 03:37), Max: 98.8 (03 Jun 2018 12:28)  HR: 94 (04 Jun 2018 03:37) (94 - 100)  BP: 131/67 (04 Jun 2018 03:37) (120/69 - 147/77)  BP(mean): --  RR: 18 (04 Jun 2018 03:37) (18 - 18)  SpO2: 99% (04 Jun 2018 03:37) (91% - 99%)    - VSS.  - Stat EKG: SR, no acute changes from prior EKG, no ST changes.  - F/U stat labs. Supplement lytes to K>4, Mg>2.  - Continue to monitor closely on telemetry.   Will endorse to primary team in AM.      Elba Porter PA-C  Department of Medicine  #[id number]

## 2018-06-05 VITALS
DIASTOLIC BLOOD PRESSURE: 71 MMHG | SYSTOLIC BLOOD PRESSURE: 130 MMHG | RESPIRATION RATE: 18 BRPM | TEMPERATURE: 97 F | OXYGEN SATURATION: 95 % | HEART RATE: 90 BPM

## 2018-06-05 LAB
HCT VFR BLD CALC: 23.5 % — LOW (ref 34.5–45)
HCT VFR BLD CALC: 27.9 % — LOW (ref 34.5–45)
HGB BLD-MCNC: 7.7 G/DL — LOW (ref 11.5–15.5)
HGB BLD-MCNC: 9 G/DL — LOW (ref 11.5–15.5)
MCHC RBC-ENTMCNC: 25.5 PG — LOW (ref 27–34)
MCHC RBC-ENTMCNC: 26.3 PG — LOW (ref 27–34)
MCHC RBC-ENTMCNC: 32.1 GM/DL — SIGNIFICANT CHANGE UP (ref 32–36)
MCHC RBC-ENTMCNC: 32.8 GM/DL — SIGNIFICANT CHANGE UP (ref 32–36)
MCV RBC AUTO: 79.6 FL — LOW (ref 80–100)
MCV RBC AUTO: 80 FL — SIGNIFICANT CHANGE UP (ref 80–100)
PLATELET # BLD AUTO: 344 K/UL — SIGNIFICANT CHANGE UP (ref 150–400)
PLATELET # BLD AUTO: 359 K/UL — SIGNIFICANT CHANGE UP (ref 150–400)
RBC # BLD: 2.94 M/UL — LOW (ref 3.8–5.2)
RBC # BLD: 3.51 M/UL — LOW (ref 3.8–5.2)
RBC # FLD: 14.4 % — SIGNIFICANT CHANGE UP (ref 10.3–14.5)
RBC # FLD: 14.6 % — HIGH (ref 10.3–14.5)
WBC # BLD: 9 K/UL — SIGNIFICANT CHANGE UP (ref 3.8–10.5)
WBC # BLD: 9.2 K/UL — SIGNIFICANT CHANGE UP (ref 3.8–10.5)
WBC # FLD AUTO: 9 K/UL — SIGNIFICANT CHANGE UP (ref 3.8–10.5)
WBC # FLD AUTO: 9.2 K/UL — SIGNIFICANT CHANGE UP (ref 3.8–10.5)

## 2018-06-05 PROCEDURE — 83010 ASSAY OF HAPTOGLOBIN QUANT: CPT

## 2018-06-05 PROCEDURE — 85610 PROTHROMBIN TIME: CPT

## 2018-06-05 PROCEDURE — 86850 RBC ANTIBODY SCREEN: CPT

## 2018-06-05 PROCEDURE — 93005 ELECTROCARDIOGRAM TRACING: CPT

## 2018-06-05 PROCEDURE — 82550 ASSAY OF CK (CPK): CPT

## 2018-06-05 PROCEDURE — 86870 RBC ANTIBODY IDENTIFICATION: CPT

## 2018-06-05 PROCEDURE — 82570 ASSAY OF URINE CREATININE: CPT

## 2018-06-05 PROCEDURE — 83735 ASSAY OF MAGNESIUM: CPT

## 2018-06-05 PROCEDURE — 82435 ASSAY OF BLOOD CHLORIDE: CPT

## 2018-06-05 PROCEDURE — 82728 ASSAY OF FERRITIN: CPT

## 2018-06-05 PROCEDURE — 82803 BLOOD GASES ANY COMBINATION: CPT

## 2018-06-05 PROCEDURE — 71275 CT ANGIOGRAPHY CHEST: CPT

## 2018-06-05 PROCEDURE — 84550 ASSAY OF BLOOD/URIC ACID: CPT

## 2018-06-05 PROCEDURE — 85045 AUTOMATED RETICULOCYTE COUNT: CPT

## 2018-06-05 PROCEDURE — 83605 ASSAY OF LACTIC ACID: CPT

## 2018-06-05 PROCEDURE — 84100 ASSAY OF PHOSPHORUS: CPT

## 2018-06-05 PROCEDURE — 84295 ASSAY OF SERUM SODIUM: CPT

## 2018-06-05 PROCEDURE — 94640 AIRWAY INHALATION TREATMENT: CPT

## 2018-06-05 PROCEDURE — 86901 BLOOD TYPING SEROLOGIC RH(D): CPT

## 2018-06-05 PROCEDURE — 85730 THROMBOPLASTIN TIME PARTIAL: CPT

## 2018-06-05 PROCEDURE — 86922 COMPATIBILITY TEST ANTIGLOB: CPT

## 2018-06-05 PROCEDURE — 80048 BASIC METABOLIC PNL TOTAL CA: CPT

## 2018-06-05 PROCEDURE — 82553 CREATINE MB FRACTION: CPT

## 2018-06-05 PROCEDURE — 86880 COOMBS TEST DIRECT: CPT

## 2018-06-05 PROCEDURE — 99285 EMERGENCY DEPT VISIT HI MDM: CPT | Mod: 25

## 2018-06-05 PROCEDURE — 83615 LACTATE (LD) (LDH) ENZYME: CPT

## 2018-06-05 PROCEDURE — 76770 US EXAM ABDO BACK WALL COMP: CPT

## 2018-06-05 PROCEDURE — 96374 THER/PROPH/DIAG INJ IV PUSH: CPT | Mod: XU

## 2018-06-05 PROCEDURE — 84300 ASSAY OF URINE SODIUM: CPT

## 2018-06-05 PROCEDURE — 83550 IRON BINDING TEST: CPT

## 2018-06-05 PROCEDURE — 80053 COMPREHEN METABOLIC PANEL: CPT

## 2018-06-05 PROCEDURE — 83036 HEMOGLOBIN GLYCOSYLATED A1C: CPT

## 2018-06-05 PROCEDURE — 97161 PT EVAL LOW COMPLEX 20 MIN: CPT

## 2018-06-05 PROCEDURE — 96375 TX/PRO/DX INJ NEW DRUG ADDON: CPT | Mod: XU

## 2018-06-05 PROCEDURE — 86905 BLOOD TYPING RBC ANTIGENS: CPT

## 2018-06-05 PROCEDURE — 82330 ASSAY OF CALCIUM: CPT

## 2018-06-05 PROCEDURE — 83690 ASSAY OF LIPASE: CPT

## 2018-06-05 PROCEDURE — 82272 OCCULT BLD FECES 1-3 TESTS: CPT

## 2018-06-05 PROCEDURE — 82947 ASSAY GLUCOSE BLOOD QUANT: CPT

## 2018-06-05 PROCEDURE — 84484 ASSAY OF TROPONIN QUANT: CPT

## 2018-06-05 PROCEDURE — 85027 COMPLETE CBC AUTOMATED: CPT

## 2018-06-05 PROCEDURE — 84443 ASSAY THYROID STIM HORMONE: CPT

## 2018-06-05 PROCEDURE — 74174 CTA ABD&PLVS W/CONTRAST: CPT

## 2018-06-05 PROCEDURE — 85014 HEMATOCRIT: CPT

## 2018-06-05 PROCEDURE — 84132 ASSAY OF SERUM POTASSIUM: CPT

## 2018-06-05 PROCEDURE — 99239 HOSP IP/OBS DSCHRG MGMT >30: CPT

## 2018-06-05 PROCEDURE — 86900 BLOOD TYPING SEROLOGIC ABO: CPT

## 2018-06-05 RX ADMIN — Medication 40 MILLIGRAM(S): at 05:21

## 2018-06-05 RX ADMIN — BUDESONIDE AND FORMOTEROL FUMARATE DIHYDRATE 2 PUFF(S): 160; 4.5 AEROSOL RESPIRATORY (INHALATION) at 08:00

## 2018-06-05 RX ADMIN — PANTOPRAZOLE SODIUM 40 MILLIGRAM(S): 20 TABLET, DELAYED RELEASE ORAL at 05:21

## 2018-06-05 RX ADMIN — AMLODIPINE BESYLATE 5 MILLIGRAM(S): 2.5 TABLET ORAL at 05:21

## 2018-06-05 RX ADMIN — Medication 100 MILLIGRAM(S): at 13:05

## 2018-06-05 RX ADMIN — Medication 100 MILLIGRAM(S): at 05:21

## 2018-06-05 NOTE — PROGRESS NOTE ADULT - PROBLEM SELECTOR PROBLEM 4
COPD (chronic obstructive pulmonary disease)
Mediastinal lymphadenopathy
Mediastinal lymphadenopathy

## 2018-06-05 NOTE — PROGRESS NOTE ADULT - PROBLEM SELECTOR PLAN 2
c/w Protonix IV for now.  Type and screen with morning labs
No further BM. Hgb 9 today.   - c/w protonix as above
No further BM. Hgb slowly downtrend, but remains ~8.  - c/w protonix as above

## 2018-06-05 NOTE — PROGRESS NOTE ADULT - PROBLEM SELECTOR PROBLEM 5
Nausea & vomiting
COPD (chronic obstructive pulmonary disease)
COPD (chronic obstructive pulmonary disease)

## 2018-06-05 NOTE — PROGRESS NOTE ADULT - ASSESSMENT
76 y/o woman PMH of COPD (not O2 dependent), former smoker, HTN, HLD, CVA (no residual deficits), thoracic aortic aneurysm who presents for dry cough, lethargy and presumed Chronic Blood loss anemia from GI bleeding.
78 y/o woman PMH of COPD (not O2 dependent), former smoker, HTN, HLD, CVA (no residual deficits), thoracic aortic aneurysm who presents for dry cough, lethargy and presumed Chronic Blood loss anemia from GI bleeding.
Stable from GI standpoint, she should have at least colonoscopy and likely repeat EGD to evaluate but would like to have her off plavix for 5 days. If stable from medical standpoint can plan as outpt.
will review office records.  no pain.  guaic positive.  cough.  ct wihtout mass.  recommend ppi therapy daily.  conservative managment.  defer endocopic eval unless decreased hgb or overt bleeding risk>> benefit now with cough/ respiratior.
77F PMH of COPD (not O2 dependent HTN, HLD, CVA (no residual deficits), thoracic aortic aneurysm who presents for dry cough, lethargy and presumed Chronic Blood loss anemia from GI bleeding.

## 2018-06-05 NOTE — PROGRESS NOTE ADULT - PROBLEM SELECTOR PLAN 5
Unclear etiology, CT unrevealing  C/w Zofran
Pt with mild COPD exacerbation, already improving.  - c/w predisone (Day 2/5)  - C/w symbicort  - Change to prn Duonebs
Pt with mild COPD exacerbation, already improving.  - c/w predisone (Day 3/5)  - C/w symbicort  - C/w prn Duonebs

## 2018-06-05 NOTE — PROGRESS NOTE ADULT - PROBLEM SELECTOR PLAN 1
pt with +Guaiac stools in the ED  I spoke with Dr. Gay who is covering for Dr. Lyles, he will evaluate the patient.  Ferritin pending to complete iron studies  GI consult pending: private: Primary day team to consult in AM
Suspect chronic blood loss anemia in the setting of low ferritin and iron with high normal TIBC and low saturation. This is the likely etiology of her fatigue. H/H has remained relatively stable (Hgb 9 today). It was 9.1 last month as per PCP.  - GI f/u appreciated. Plan for outpatient EGD/colonoscopy after plavix held at least 5 days  - D/C FeSO4 325 mg daily (due to SE)  - As per patient, she is scheduled for iron infusion as outpatient next Monday (6/11), no urgent indication for transfusion at this time
Suspect chronic blood loss anemia in the setting of low ferritin and iron with high normal TIBC and low saturation. This is the likely etiology of her fatigue. H/H has remained relatively stable. She has had no BM last 24 hours.  - GI consult appreciated. At this time, risks of endoscopy > benefits, as per Dr. Gay (given her underlying COPD)  - c/w monitoring CBC  - Change IV protonix to protonix 40 mg qam  - Start FeSO4 325 mg daily  - As per patient, she is scheduled for iron infusion as outpatient next week

## 2018-06-05 NOTE — PROGRESS NOTE ADULT - PROBLEM SELECTOR PLAN 3
with ATN.   FeNa = 3.0%  Renal US pending  Avoid nephrotoxic agents
FENa elevated, unclear significance (no UA sent). Renal U/S c/w increased echogenicity of renal cortex b/l, no hydronephrosis. Suspect may be element of CKD III, as Cr is stable and just slightly increased from prior baseline (confirmed with PCP)  - Avoid nephrotoxins
FENa elevated, unclear significance (no UA sent). Renal U/S c/w increased echogenicity of renal cortex b/l, no hydronephrosis. Suspect may be element of CKD III, as Cr is stable and just slightly increased from prior baseline.  - c/w trending BMP  - Avoid nephrotoxins

## 2018-06-05 NOTE — PROGRESS NOTE ADULT - ATTENDING COMMENTS
Plan d/w patient at bedside and NP (Evelin). d/w patient's PCP (Dr. Geronimo) and cardiologist (Dr. Mantilla). Patient is medically stable for D/C home today w/ PT. She will f/u with Heme on 6/11 and PCP and GI next week.    Discharge planning time 35 minutes.     Manuel Cameron M.D.  Hospitalist  Pager: [phone]

## 2018-06-05 NOTE — PROGRESS NOTE ADULT - PROBLEM SELECTOR PLAN 6
C/w home amlodipine
Improved, unclear etiology.  - c/w monitoring
d/w PCP. Patient has longstanding h/o nausea, has had EGD in the past due to this. Does not tolerate some medications (including PO iron). Currently asymptomatic.

## 2018-06-05 NOTE — PROGRESS NOTE ADULT - PROBLEM SELECTOR PLAN 8
F/u with PCP for repeat imaging in 6 months. pt states that she Dr. Lyles follows her know aneurysm.
C/w home atorvastatin  - ASA and plavix held for now  - If H/H remains stable, consider restarting on D/C (if cleared by GI)
C/w home atorvastatin. d/w patient's cardiologist and PCP.  - Resume home ASA  - Holding plavix on D/C

## 2018-06-05 NOTE — PROGRESS NOTE ADULT - SUBJECTIVE AND OBJECTIVE BOX
INTERVAL HPI/OVERNIGHT EVENTS: feels better, no active GI bleeding, on plavix for CVA, pt reported to me that she had colonoscopy in 2014 but recollection isn't certain    MEDICATIONS  (STANDING):  amLODIPine   Tablet 5 milliGRAM(s) Oral daily  atorvastatin 10 milliGRAM(s) Oral at bedtime  benzonatate 100 milliGRAM(s) Oral three times a day  buDESOnide 160 MICROgram(s)/formoterol 4.5 MICROgram(s) Inhaler 2 Puff(s) Inhalation two times a day  ferrous    sulfate 325 milliGRAM(s) Oral daily  pantoprazole    Tablet 40 milliGRAM(s) Oral before breakfast  predniSONE   Tablet 40 milliGRAM(s) Oral daily    MEDICATIONS  (PRN):  ALBUTerol/ipratropium for Nebulization 3 milliLiter(s) Nebulizer every 6 hours PRN Shortness of Breath and/or Wheezing  ondansetron    Tablet 4 milliGRAM(s) Oral every 8 hours PRN Nausea and/or Vomiting      Allergies    No Known Allergies    Intolerances            PHYSICAL EXAM:   Vital Signs:  Vital Signs Last 24 Hrs  T(C): 36.6 (05 Jun 2018 04:09), Max: 37.1 (04 Jun 2018 20:44)  T(F): 97.8 (05 Jun 2018 04:09), Max: 98.7 (04 Jun 2018 20:44)  HR: 90 (05 Jun 2018 04:09) (90 - 96)  BP: 145/77 (05 Jun 2018 04:09) (133/58 - 145/77)  BP(mean): --  RR: 18 (05 Jun 2018 04:09) (18 - 18)  SpO2: 96% (05 Jun 2018 04:09) (93% - 96%)  Daily     Daily     GENERAL:  no distress  HEENT:  NC/AT,  anicteric  CHEST:   no increased effort, breath sounds clear  HEART:  Regular rhythm  ABDOMEN:  Soft, non-tender, non-distended, normoactive bowel sounds,  no masses ,no hepato-splenomegaly, no signs of chronic liver disease  EXTEREMITIES:  no cyanosis      LABS:                        7.7    9.0   )-----------( 344      ( 05 Jun 2018 06:16 )             23.5     06-04    141  |  103  |  19  ----------------------------<  150<H>  3.4<L>   |  24  |  1.51<H>    Ca    8.7      04 Jun 2018 03:47  Phos  3.2     06-04  Mg     2.0     06-04            RADIOLOGY & ADDITIONAL TESTS:
Patient is a 77y old  Female who presents with a chief complaint of cough, fatigue (03 Jun 2018 03:43)      SUBJECTIVE / OVERNIGHT EVENTS: Patient seen and examined. She reports her SOB is improved. One BM yesterday, no melena.     REVIEW OF SYSTEMS    General: No fevers, chills  	  Ophthalmologic: No change in vision    Respiratory and Thorax: +SOB and cough  	  Cardiovascular: No chest pain, palpitations, or LE edema    Gastrointestinal: No abdominal pain, nausea, vomiting, or diarrhea    Genitourinary: No dysuria or polyuria    MEDICATIONS  (STANDING):  amLODIPine   Tablet 5 milliGRAM(s) Oral daily  atorvastatin 10 milliGRAM(s) Oral at bedtime  benzonatate 100 milliGRAM(s) Oral three times a day  buDESOnide 160 MICROgram(s)/formoterol 4.5 MICROgram(s) Inhaler 2 Puff(s) Inhalation two times a day  ferrous    sulfate 325 milliGRAM(s) Oral daily  pantoprazole    Tablet 40 milliGRAM(s) Oral before breakfast  predniSONE   Tablet 40 milliGRAM(s) Oral daily    MEDICATIONS  (PRN):  ALBUTerol/ipratropium for Nebulization 3 milliLiter(s) Nebulizer every 6 hours PRN Shortness of Breath and/or Wheezing  ondansetron    Tablet 4 milliGRAM(s) Oral every 8 hours PRN Nausea and/or Vomiting      I&O's Summary    04 Jun 2018 07:01  -  05 Jun 2018 07:00  --------------------------------------------------------  IN: 660 mL / OUT: 0 mL / NET: 660 mL    05 Jun 2018 07:01  -  05 Jun 2018 14:28  --------------------------------------------------------  IN: 480 mL / OUT: 0 mL / NET: 480 mL    Vital Signs Last 24 Hrs  T(C): 36.3 (05 Jun 2018 11:31), Max: 37.1 (04 Jun 2018 20:44)  T(F): 97.4 (05 Jun 2018 11:31), Max: 98.7 (04 Jun 2018 20:44)  HR: 90 (05 Jun 2018 11:31) (90 - 96)  BP: 130/71 (05 Jun 2018 11:31) (130/71 - 145/77)  BP(mean): --  RR: 18 (05 Jun 2018 11:31) (18 - 18)  SpO2: 95% (05 Jun 2018 11:31) (95% - 96%)    PHYSICAL EXAM:  GENERAL: NAD, well-developed  HEAD:  Atraumatic, Normocephalic  CHEST/LUNG: Grossly CTA b/l  HEART: Regular rate and rhythm; No murmurs, rubs, or gallops  ABDOMEN: Soft, Nontender, Nondistended; Bowel sounds present  EXTREMITIES:  2+ Peripheral Pulses, No clubbing, cyanosis, or edema  PSYCH: AAOx3  LYMPH: No appreciable cervical, axillary, or inguinal LAD  NEUROLOGY: non-focal    LABS:  (06-05 @ 12:25)                      9.0  9.2 )-----------( 359                 27.9    Neutrophils = -- (--%)  Lymphocytes = -- (--%)  Eosinophils = -- (--%)  Basophils = -- (--%)  Monocytes = -- (--%)  Bands = --%    06-04    141  |  103  |  19  ----------------------------<  150<H>  3.4<L>   |  24  |  1.51<H>    Ca    8.7      04 Jun 2018 03:47  Phos  3.2     06-04  Mg     2.0     06-04         RADIOLOGY & ADDITIONAL TESTS:    Imaging Personally Reviewed: CT C/A/P: 3.7 TAA. +mediastinal LN (largest 1.3 cm), but no lung or abdominal mass. No other LAD.   Consultant(s) Notes Reviewed:  GI  Care Discussed with Consultants/Other Providers: GI, cardiology, PCP
Patient is a 77y old  Female who presents with a chief complaint of cough, fatigue (03 Jun 2018 03:43)      SUBJECTIVE / OVERNIGHT EVENTS: Patient seen and examined. She reports her SOB is moderately improved. Continued dry cough. She has not had a BM in > 24 hours.     REVIEW OF SYSTEMS      General: No fevers, chills  	  Ophthalmologic: No change in vision    Respiratory and Thorax: +SOB and cough  	  Cardiovascular: No chest pain, palpitations, or LE edema    Gastrointestinal: No abdominal pain, nausea, vomiting, or diarrhea    Genitourinary: No dysuria or polyuria    MEDICATIONS  (STANDING):  ALBUTerol/ipratropium for Nebulization 3 milliLiter(s) Nebulizer every 6 hours  amLODIPine   Tablet 5 milliGRAM(s) Oral daily  atorvastatin 10 milliGRAM(s) Oral at bedtime  benzonatate 100 milliGRAM(s) Oral three times a day  buDESOnide 160 MICROgram(s)/formoterol 4.5 MICROgram(s) Inhaler 2 Puff(s) Inhalation two times a day  ferrous    sulfate 325 milliGRAM(s) Oral daily  pantoprazole    Tablet 40 milliGRAM(s) Oral before breakfast  predniSONE   Tablet 40 milliGRAM(s) Oral daily    MEDICATIONS  (PRN):  ondansetron    Tablet 4 milliGRAM(s) Oral every 8 hours PRN Nausea and/or Vomiting      I&O's Summary    03 Jun 2018 07:01  -  04 Jun 2018 07:00  --------------------------------------------------------  IN: 480 mL / OUT: 300 mL / NET: 180 mL    04 Jun 2018 07:01  -  04 Jun 2018 14:12  --------------------------------------------------------  IN: 540 mL / OUT: 0 mL / NET: 540 mL    Vital Signs Last 24 Hrs  T(C): 36.8 (04 Jun 2018 11:43), Max: 37.1 (03 Jun 2018 20:35)  T(F): 98.2 (04 Jun 2018 11:43), Max: 98.7 (03 Jun 2018 20:35)  HR: 90 (04 Jun 2018 11:43) (90 - 100)  BP: 144/81 (04 Jun 2018 11:43) (120/69 - 147/77)  BP(mean): --  RR: 18 (04 Jun 2018 11:43) (18 - 18)  SpO2: 96% (04 Jun 2018 11:43) (91% - 99%)    PHYSICAL EXAM:  GENERAL: NAD, well-developed  HEAD:  Atraumatic, Normocephalic  CHEST/LUNG: Grossly CTA b/l  HEART: Regular rate and rhythm; No murmurs, rubs, or gallops  ABDOMEN: Soft, Nontender, Nondistended; Bowel sounds present  EXTREMITIES:  2+ Peripheral Pulses, No clubbing, cyanosis, or edema  PSYCH: AAOx3  LYMPH: No appreciable cervical, axillary, or inguinal LAD  NEUROLOGY: non-focal    LABS:  (06-04 @ 03:47)                      7.9  5.8 )-----------( 323                 24.7    Neutrophils = -- (--%)  Lymphocytes = -- (--%)  Eosinophils = -- (--%)  Basophils = -- (--%)  Monocytes = -- (--%)  Bands = --%    06-04    141  |  103  |  19  ----------------------------<  150<H>  3.4<L>   |  24  |  1.51<H>    Ca    8.7      04 Jun 2018 03:47  Phos  3.2     06-04  Mg     2.0     06-04    TPro  6.6  /  Alb  3.8  /  TBili  0.8  /  DBili  x   /  AST  12  /  ALT  8<L>  /  AlkPhos  65  06-03    ( 02 Jun 2018 21:52 )   PT: 10.9 sec;   INR: 1.00 ratio;       PTT:24.9 sec  CARDIAC MARKERS ( 03 Jun 2018 05:57 )  Trop <0.01 ng/mL / CK 72 U/L / CKMB x       CARDIAC MARKERS ( 02 Jun 2018 21:52 )  Trop <0.01 ng/mL / CK x     / CKMB x         RADIOLOGY & ADDITIONAL TESTS:    Imaging Personally Reviewed: CT C/A/P: 3.7 TAA. +mediastinal LN (largest 1.3 cm), but no lung or abdominal mass. No other LAD.   Consultant(s) Notes Reviewed:  GI  Care Discussed with Consultants/Other Providers: GI
SULMA MEI:[id number],   77yFemale followed for:  No Known Allergies    PAST MEDICAL & SURGICAL HISTORY:  Hypertension  Cerebrovascular accident (CVA)  Former smoker  Gallstones  Hyperlipidemia  Dizziness  Stenosis of carotid artery: may 2017  COPD (chronic obstructive pulmonary disease)  H/O carotid endarterectomy    FAMILY HISTORY:  No pertinent family history in first degree relatives    MEDICATIONS  (STANDING):  ALBUTerol/ipratropium for Nebulization 3 milliLiter(s) Nebulizer every 6 hours  amLODIPine   Tablet 5 milliGRAM(s) Oral daily  atorvastatin 10 milliGRAM(s) Oral at bedtime  benzonatate 100 milliGRAM(s) Oral three times a day  buDESOnide 160 MICROgram(s)/formoterol 4.5 MICROgram(s) Inhaler 2 Puff(s) Inhalation two times a day  pantoprazole  Injectable 40 milliGRAM(s) IV Push every 12 hours  potassium chloride    Tablet ER 20 milliEquivalent(s) Oral every 2 hours  predniSONE   Tablet 40 milliGRAM(s) Oral daily  sodium chloride 0.9%. 1000 milliLiter(s) (75 mL/Hr) IV Continuous <Continuous>    MEDICATIONS  (PRN):  ondansetron    Tablet 4 milliGRAM(s) Oral every 8 hours PRN Nausea and/or Vomiting      Vital Signs Last 24 Hrs  T(C): 36.9 (04 Jun 2018 03:37), Max: 37.1 (03 Jun 2018 12:28)  T(F): 98.4 (04 Jun 2018 03:37), Max: 98.8 (03 Jun 2018 12:28)  HR: 94 (04 Jun 2018 03:37) (94 - 100)  BP: 131/67 (04 Jun 2018 03:37) (120/69 - 147/77)  BP(mean): --  RR: 18 (04 Jun 2018 03:37) (18 - 18)  SpO2: 99% (04 Jun 2018 03:37) (91% - 99%)  nc/at  s1s2  cta  soft, nt, nd no guarding or rebound  no c/c/e    CBC Full  -  ( 04 Jun 2018 03:47 )  WBC Count : 5.8 K/uL  Hemoglobin : 7.9 g/dL  Hematocrit : 24.7 %  Platelet Count - Automated : 323 K/uL  Mean Cell Volume : 79.1 fl  Mean Cell Hemoglobin : 25.3 pg  Mean Cell Hemoglobin Concentration : 32.0 gm/dL  Auto Neutrophil # : x  Auto Lymphocyte # : x  Auto Monocyte # : x  Auto Eosinophil # : x  Auto Basophil # : x  Auto Neutrophil % : x  Auto Lymphocyte % : x  Auto Monocyte % : x  Auto Eosinophil % : x  Auto Basophil % : x    06-04    141  |  103  |  19  ----------------------------<  150<H>  3.4<L>   |  24  |  1.51<H>    Ca    8.7      04 Jun 2018 03:47  Phos  3.2     06-04  Mg     2.0     06-04    TPro  6.6  /  Alb  3.8  /  TBili  0.8  /  DBili  x   /  AST  12  /  ALT  8<L>  /  AlkPhos  65  06-03    PT/INR - ( 02 Jun 2018 21:52 )   PT: 10.9 sec;   INR: 1.00 ratio         PTT - ( 02 Jun 2018 21:52 )  PTT:24.9 sec
Patient is a 77y old  Female who presents with a chief complaint of cough, fatigue (03 Jun 2018 03:43)      SUBJECTIVE / OVERNIGHT EVENTS:    Pt admitted overnight.  Pt with c/o Shortness of breath and fatigue and cough.    MEDICATIONS  (STANDING):  ALBUTerol/ipratropium for Nebulization 3 milliLiter(s) Nebulizer every 6 hours  amLODIPine   Tablet 5 milliGRAM(s) Oral daily  atorvastatin 10 milliGRAM(s) Oral at bedtime  benzonatate 100 milliGRAM(s) Oral three times a day  buDESOnide 160 MICROgram(s)/formoterol 4.5 MICROgram(s) Inhaler 2 Puff(s) Inhalation two times a day  pantoprazole  Injectable 40 milliGRAM(s) IV Push every 12 hours  predniSONE   Tablet 40 milliGRAM(s) Oral daily  sodium chloride 0.9%. 1000 milliLiter(s) (75 mL/Hr) IV Continuous <Continuous>    MEDICATIONS  (PRN):  ondansetron    Tablet 4 milliGRAM(s) Oral every 8 hours PRN Nausea and/or Vomiting      T(C): 37.1 (06-03 @ 12:28), Max: 37.1 (06-03 @ 12:28)   HR: 94   BP: 137/59   RR: 18   SpO2: 94%    PHYSICAL EXAM:  GENERAL: NAD, well-developed  HEAD:  Atraumatic, Normocephalic  CHEST/LUNG: +Rhonchi, No wheeze  HEART: Regular rate and rhythm; No murmurs, rubs, or gallops  ABDOMEN: Soft, Nontender, Nondistended; Bowel sounds present  EXTREMITIES:  2+ Peripheral Pulses, No clubbing, cyanosis, or edema  PSYCH: AAOx3  NEUROLOGY: non-focal    LABS:                        8.2    5.4   )-----------( 297      ( 03 Jun 2018 05:57 )             25.8      06-03    139  |  102  |  18  ----------------------------<  107<H>  3.6   |  25  |  1.46<H>    Ca    8.7      03 Jun 2018 05:57    TPro  6.6  /  Alb  3.8  /  TBili  0.8  /  DBili  x   /  AST  12  /  ALT  8<L>  /  AlkPhos  65  06-03       CAPILLARY BLOOD GLUCOSE          RADIOLOGY & ADDITIONAL TESTS:    Imaging Personally Reviewed:  Consultant(s) Notes Reviewed:    Care Discussed with Consultants/Other Providers:

## 2018-06-19 ENCOUNTER — APPOINTMENT (OUTPATIENT)
Dept: ELECTROPHYSIOLOGY | Facility: CLINIC | Age: 78
End: 2018-06-19
Payer: MEDICARE

## 2018-06-19 DIAGNOSIS — I63.9 CEREBRAL INFARCTION, UNSPECIFIED: ICD-10-CM

## 2018-06-19 PROCEDURE — 93298 REM INTERROG DEV EVAL SCRMS: CPT

## 2018-07-16 PROBLEM — I65.29 OCCLUSION AND STENOSIS OF UNSPECIFIED CAROTID ARTERY: Chronic | Status: ACTIVE | Noted: 2017-09-07

## 2018-08-11 PROBLEM — K80.20 CALCULUS OF GALLBLADDER WITHOUT CHOLECYSTITIS WITHOUT OBSTRUCTION: Chronic | Status: ACTIVE | Noted: 2017-09-07

## 2018-08-11 PROBLEM — R42 DIZZINESS AND GIDDINESS: Chronic | Status: ACTIVE | Noted: 2017-09-07

## 2018-08-11 PROBLEM — Z87.891 PERSONAL HISTORY OF NICOTINE DEPENDENCE: Chronic | Status: ACTIVE | Noted: 2017-09-07

## 2018-08-11 PROBLEM — E78.5 HYPERLIPIDEMIA, UNSPECIFIED: Chronic | Status: ACTIVE | Noted: 2017-09-07

## 2018-08-13 PROBLEM — I63.9 CVA (CEREBRAL VASCULAR ACCIDENT): Status: ACTIVE | Noted: 2017-09-14

## 2018-08-22 ENCOUNTER — APPOINTMENT (OUTPATIENT)
Dept: ELECTROPHYSIOLOGY | Facility: CLINIC | Age: 78
End: 2018-08-22
Payer: MEDICARE

## 2018-08-22 PROCEDURE — XXXXX: CPT

## 2018-10-09 ENCOUNTER — APPOINTMENT (OUTPATIENT)
Dept: ELECTROPHYSIOLOGY | Facility: CLINIC | Age: 78
End: 2018-10-09

## 2018-12-27 ENCOUNTER — APPOINTMENT (OUTPATIENT)
Dept: PULMONOLOGY | Facility: CLINIC | Age: 78
End: 2018-12-27
Payer: MEDICARE

## 2018-12-27 VITALS
SYSTOLIC BLOOD PRESSURE: 121 MMHG | DIASTOLIC BLOOD PRESSURE: 71 MMHG | OXYGEN SATURATION: 94 % | WEIGHT: 130 LBS | RESPIRATION RATE: 16 BRPM | HEART RATE: 87 BPM | HEIGHT: 64 IN | BODY MASS INDEX: 22.2 KG/M2 | TEMPERATURE: 97.7 F

## 2018-12-27 PROCEDURE — 88738 HGB QUANT TRANSCUTANEOUS: CPT

## 2018-12-27 PROCEDURE — 71046 X-RAY EXAM CHEST 2 VIEWS: CPT

## 2018-12-27 PROCEDURE — 94729 DIFFUSING CAPACITY: CPT

## 2018-12-27 PROCEDURE — 99204 OFFICE O/P NEW MOD 45 MIN: CPT | Mod: 25

## 2018-12-27 PROCEDURE — 94727 GAS DIL/WSHOT DETER LNG VOL: CPT

## 2018-12-27 PROCEDURE — 94060 EVALUATION OF WHEEZING: CPT

## 2018-12-27 RX ORDER — AMLODIPINE BESYLATE 2.5 MG/1
2.5 TABLET ORAL
Qty: 180 | Refills: 0 | Status: DISCONTINUED | COMMUNITY
Start: 2018-07-11

## 2018-12-27 RX ORDER — CLOPIDOGREL 75 MG/1
75 TABLET, FILM COATED ORAL DAILY
Qty: 30 | Refills: 1 | Status: DISCONTINUED | COMMUNITY
End: 2018-12-27

## 2018-12-27 RX ORDER — ALBUTEROL SULFATE 1.25 MG/3ML
1.25 SOLUTION RESPIRATORY (INHALATION)
Qty: 225 | Refills: 0 | Status: ACTIVE | COMMUNITY
Start: 2018-06-13

## 2018-12-27 RX ORDER — SODIUM PICOSULFATE, MAGNESIUM OXIDE, AND ANHYDROUS CITRIC ACID 10; 3.5; 12 MG/160ML; G/160ML; G/160ML
10-3.5-12 MG-GM LIQUID ORAL
Qty: 320 | Refills: 0 | Status: DISCONTINUED | COMMUNITY
Start: 2018-07-11

## 2019-01-16 ENCOUNTER — OUTPATIENT (OUTPATIENT)
Dept: OUTPATIENT SERVICES | Facility: HOSPITAL | Age: 79
LOS: 1 days | End: 2019-01-16
Payer: MEDICARE

## 2019-01-16 ENCOUNTER — APPOINTMENT (OUTPATIENT)
Dept: CT IMAGING | Facility: IMAGING CENTER | Age: 79
End: 2019-01-16
Payer: MEDICARE

## 2019-01-16 DIAGNOSIS — Z98.890 OTHER SPECIFIED POSTPROCEDURAL STATES: Chronic | ICD-10-CM

## 2019-01-16 DIAGNOSIS — Z00.8 ENCOUNTER FOR OTHER GENERAL EXAMINATION: ICD-10-CM

## 2019-01-16 PROCEDURE — 71275 CT ANGIOGRAPHY CHEST: CPT

## 2019-01-16 PROCEDURE — 82565 ASSAY OF CREATININE: CPT

## 2019-01-16 PROCEDURE — 71275 CT ANGIOGRAPHY CHEST: CPT | Mod: 26

## 2019-01-31 ENCOUNTER — APPOINTMENT (OUTPATIENT)
Dept: PULMONOLOGY | Facility: CLINIC | Age: 79
End: 2019-01-31
Payer: MEDICARE

## 2019-01-31 VITALS
OXYGEN SATURATION: 90 % | HEART RATE: 104 BPM | DIASTOLIC BLOOD PRESSURE: 78 MMHG | RESPIRATION RATE: 16 BRPM | TEMPERATURE: 97.9 F | SYSTOLIC BLOOD PRESSURE: 146 MMHG

## 2019-01-31 PROCEDURE — 99213 OFFICE O/P EST LOW 20 MIN: CPT | Mod: 25

## 2019-01-31 PROCEDURE — 94060 EVALUATION OF WHEEZING: CPT

## 2019-01-31 NOTE — ASSESSMENT
[FreeTextEntry1] : No change with inhaler, and powder may be causing irritation\par will change to stiolto

## 2019-01-31 NOTE — PHYSICAL EXAM
[General Appearance - Well Developed] : well developed [General Appearance - Well Nourished] : well nourished [Normal Conjunctiva] : the conjunctiva exhibited no abnormalities [Normal Oropharynx] : abnormal oropharynx [Low Lying Soft Palate] : no low lying soft palate [Elongated Uvula] : no elongated uvula [Enlarged Base of the Tongue] : no enlargement of the base of the tongue [Neck Appearance] : the appearance of the neck was normal [Heart Sounds] : normal S1 and S2 [Respiration, Rhythm And Depth] : normal respiratory rhythm and effort [Bowel Sounds] : normal bowel sounds [Abdomen Soft] : soft [] : no hepato-splenomegaly [Abnormal Walk] : normal gait [Nail Clubbing] : no clubbing of the fingernails [Cyanosis, Localized] : no localized cyanosis

## 2019-02-25 ENCOUNTER — RX RENEWAL (OUTPATIENT)
Age: 79
End: 2019-02-25

## 2019-03-22 NOTE — PHYSICAL THERAPY INITIAL EVALUATION ADULT - GENERAL OBSERVATIONS, REHAB EVAL
The patient is a 71y Male complaining of see chief complaint quote. Pt received semi-supine in bed, (+) telemetry, NAD. Pt alert, agreeable to PT eval with some encouragement, c/o nausea.

## 2019-03-26 ENCOUNTER — APPOINTMENT (OUTPATIENT)
Dept: PULMONOLOGY | Facility: CLINIC | Age: 79
End: 2019-03-26
Payer: MEDICARE

## 2019-03-26 VITALS
RESPIRATION RATE: 16 BRPM | TEMPERATURE: 97.9 F | OXYGEN SATURATION: 90 % | DIASTOLIC BLOOD PRESSURE: 69 MMHG | HEART RATE: 94 BPM | SYSTOLIC BLOOD PRESSURE: 113 MMHG

## 2019-03-26 DIAGNOSIS — R93.89 ABNORMAL FINDINGS ON DIAGNOSTIC IMAGING OF OTHER SPECIFIED BODY STRUCTURES: ICD-10-CM

## 2019-03-26 DIAGNOSIS — R49.0 DYSPHONIA: ICD-10-CM

## 2019-03-26 PROCEDURE — 94060 EVALUATION OF WHEEZING: CPT

## 2019-03-26 PROCEDURE — 99214 OFFICE O/P EST MOD 30 MIN: CPT | Mod: 25

## 2019-03-27 PROBLEM — R93.89 ABNORMAL CHEST XRAY: Status: ACTIVE | Noted: 2018-12-27

## 2019-03-27 NOTE — REVIEW OF SYSTEMS
[Recent Wt Loss (___ Lbs)] : no recent weight loss [Cough] : no cough [Sputum] : not coughing up ~M sputum [Hemoptysis] : no hemoptysis [Dyspnea] : dyspnea

## 2019-03-27 NOTE — PROCEDURE
[FreeTextEntry1] : \par Spirometry demonstrates severe airway obstruction unchanged from prior visits\par \par CT Chest: Evaluation of the lungs demonstrate biapical scarring unchanged. There is a \par 2 mm right middle lobe calcified granuloma. There is a 2 mm right upper lobe \par calcified granuloma. There are minimal right lower lobe linear atelectasis \par or scarring. There is no pneumonia. There are no new lung nodules. There are \par no central endobronchial lesions. \par \par Evaluation of the bones demonstrate degenerative changes of the spine. There \par are healed right rib fractures. \par \par IMPRESSION: No pulmonary arterial emboli. \par \par Atherosclerotic disease of the aorta with areas of enlargement appears \par unchanged since June 2, 2018 as described above. \par \par

## 2019-03-27 NOTE — PHYSICAL EXAM
[FreeTextEntry1] : Pleasant female no acute distress [Normal Conjunctiva] : the conjunctiva exhibited no abnormalities [Eyelids - No Xanthelasma] : the eyelids demonstrated no xanthelasmas [Normal Oropharynx] : normal oropharynx [Neck Appearance] : the appearance of the neck was normal [Neck Cervical Mass (___cm)] : no neck mass was observed [Jugular Venous Distention Increased] : there was no jugular-venous distention [Thyroid Diffuse Enlargement] : the thyroid was not enlarged [Thyroid Nodule] : there were no palpable thyroid nodules [Heart Rate And Rhythm] : heart rate and rhythm were normal [Heart Sounds] : normal S1 and S2 [Murmurs] : no murmurs present [Respiration, Rhythm And Depth] : normal respiratory rhythm and effort [Exaggerated Use Of Accessory Muscles For Inspiration] : no accessory muscle use [Auscultation Breath Sounds / Voice Sounds] : lungs were clear to auscultation bilaterally [Abdomen Soft] : soft [Abdomen Tenderness] : non-tender [Abdomen Mass (___ Cm)] : no abdominal mass palpated [Abnormal Walk] : normal gait [Gait - Sufficient For Exercise Testing] : the gait was sufficient for exercise testing [Nail Clubbing] : no clubbing of the fingernails [Cyanosis, Localized] : no localized cyanosis [Petechial Hemorrhages (___cm)] : no petechial hemorrhages [Deep Tendon Reflexes (DTR)] : deep tendon reflexes were 2+ and symmetric [Sensation] : the sensory exam was normal to light touch and pinprick [No Focal Deficits] : no focal deficits [Skin Color & Pigmentation] : normal skin color and pigmentation [Skin Turgor] : normal skin turgor [] : no rash

## 2019-03-27 NOTE — HISTORY OF PRESENT ILLNESS
[Cigarettes ___ /Day] : reports smoking [unfilled] packs of cigarettes per day [___ Pack Year History] : [unfilled] pack year history [de-identified] : 12 years ago [FreeTextEntry1] : Hx of COPD\par On inhalers-was hoarse on Anoro, switced to Stiolto without improvement and no change in hoaseness

## 2019-03-27 NOTE — ASSESSMENT
[FreeTextEntry1] : COPD by PFT testing. Has hoarseness. Etiology of hoarseness unclear but doesn't appear to be related to inhalers. Recommend ENT evaluation\par \par Chest CT reveals no evidence of parenchymal lung disease. There is some pleural disease. She will require ongoing imaging for aortic vascular disease which should suffice for pulmonary followup as well

## 2019-03-28 ENCOUNTER — EMERGENCY (EMERGENCY)
Facility: HOSPITAL | Age: 79
LOS: 1 days | Discharge: HOME | End: 2019-03-28
Attending: STUDENT IN AN ORGANIZED HEALTH CARE EDUCATION/TRAINING PROGRAM | Admitting: STUDENT IN AN ORGANIZED HEALTH CARE EDUCATION/TRAINING PROGRAM
Payer: MEDICARE

## 2019-03-28 VITALS
TEMPERATURE: 98 F | RESPIRATION RATE: 16 BRPM | OXYGEN SATURATION: 95 % | HEART RATE: 99 BPM | SYSTOLIC BLOOD PRESSURE: 111 MMHG | DIASTOLIC BLOOD PRESSURE: 89 MMHG

## 2019-03-28 VITALS
HEART RATE: 90 BPM | TEMPERATURE: 97 F | DIASTOLIC BLOOD PRESSURE: 76 MMHG | OXYGEN SATURATION: 96 % | SYSTOLIC BLOOD PRESSURE: 171 MMHG | RESPIRATION RATE: 18 BRPM

## 2019-03-28 DIAGNOSIS — Z98.890 OTHER SPECIFIED POSTPROCEDURAL STATES: Chronic | ICD-10-CM

## 2019-03-28 LAB
ALBUMIN SERPL ELPH-MCNC: 4 G/DL — SIGNIFICANT CHANGE UP (ref 3.3–5)
ALP SERPL-CCNC: 67 U/L — SIGNIFICANT CHANGE UP (ref 40–120)
ALT FLD-CCNC: 13 U/L — SIGNIFICANT CHANGE UP (ref 4–33)
ANION GAP SERPL CALC-SCNC: 11 MMO/L — SIGNIFICANT CHANGE UP (ref 7–14)
AST SERPL-CCNC: 20 U/L — SIGNIFICANT CHANGE UP (ref 4–32)
BILIRUB SERPL-MCNC: 1 MG/DL — SIGNIFICANT CHANGE UP (ref 0.2–1.2)
BUN SERPL-MCNC: 21 MG/DL — SIGNIFICANT CHANGE UP (ref 7–23)
CALCIUM SERPL-MCNC: 9.2 MG/DL — SIGNIFICANT CHANGE UP (ref 8.4–10.5)
CHLORIDE SERPL-SCNC: 103 MMOL/L — SIGNIFICANT CHANGE UP (ref 98–107)
CO2 SERPL-SCNC: 28 MMOL/L — SIGNIFICANT CHANGE UP (ref 22–31)
CREAT SERPL-MCNC: 1.35 MG/DL — HIGH (ref 0.5–1.3)
GLUCOSE SERPL-MCNC: 97 MG/DL — SIGNIFICANT CHANGE UP (ref 70–99)
HCT VFR BLD CALC: 46.6 % — HIGH (ref 34.5–45)
HGB BLD-MCNC: 14.4 G/DL — SIGNIFICANT CHANGE UP (ref 11.5–15.5)
INR BLD: 0.96 — SIGNIFICANT CHANGE UP (ref 0.88–1.17)
MAGNESIUM SERPL-MCNC: 2.2 MG/DL — SIGNIFICANT CHANGE UP (ref 1.6–2.6)
MCHC RBC-ENTMCNC: 29.6 PG — SIGNIFICANT CHANGE UP (ref 27–34)
MCHC RBC-ENTMCNC: 30.9 % — LOW (ref 32–36)
MCV RBC AUTO: 95.7 FL — SIGNIFICANT CHANGE UP (ref 80–100)
NRBC # FLD: 0 K/UL — SIGNIFICANT CHANGE UP (ref 0–0)
PHOSPHATE SERPL-MCNC: 2.6 MG/DL — SIGNIFICANT CHANGE UP (ref 2.5–4.5)
PLATELET # BLD AUTO: 271 K/UL — SIGNIFICANT CHANGE UP (ref 150–400)
PMV BLD: 10.9 FL — SIGNIFICANT CHANGE UP (ref 7–13)
POTASSIUM SERPL-MCNC: 4.7 MMOL/L — SIGNIFICANT CHANGE UP (ref 3.5–5.3)
POTASSIUM SERPL-SCNC: 4.7 MMOL/L — SIGNIFICANT CHANGE UP (ref 3.5–5.3)
PROT SERPL-MCNC: 7.5 G/DL — SIGNIFICANT CHANGE UP (ref 6–8.3)
PROTHROM AB SERPL-ACNC: 10.9 SEC — SIGNIFICANT CHANGE UP (ref 9.8–13.1)
RBC # BLD: 4.87 M/UL — SIGNIFICANT CHANGE UP (ref 3.8–5.2)
RBC # FLD: 12.7 % — SIGNIFICANT CHANGE UP (ref 10.3–14.5)
SODIUM SERPL-SCNC: 142 MMOL/L — SIGNIFICANT CHANGE UP (ref 135–145)
TROPONIN T, HIGH SENSITIVITY: 26 NG/L — SIGNIFICANT CHANGE UP (ref ?–14)
TROPONIN T, HIGH SENSITIVITY: 28 NG/L — SIGNIFICANT CHANGE UP (ref ?–14)
WBC # BLD: 6.72 K/UL — SIGNIFICANT CHANGE UP (ref 3.8–10.5)
WBC # FLD AUTO: 6.72 K/UL — SIGNIFICANT CHANGE UP (ref 3.8–10.5)

## 2019-03-28 PROCEDURE — 99283 EMERGENCY DEPT VISIT LOW MDM: CPT | Mod: GC

## 2019-03-28 RX ADMIN — Medication 30 MILLILITER(S): at 14:19

## 2019-03-28 NOTE — ED ADULT NURSE NOTE - NSIMPLEMENTINTERV_GEN_ALL_ED
Implemented All Fall Risk Interventions:  [place] to call system. Call bell, personal items and telephone within reach. Instruct patient to call for assistance. Room bathroom lighting operational. Non-slip footwear when patient is off stretcher. Physically safe environment: no spills, clutter or unnecessary equipment. Stretcher in lowest position, wheels locked, appropriate side rails in place. Provide visual cue, wrist band, yellow gown, etc. Monitor gait and stability. Monitor for mental status changes and reorient to person, place, and time. Review medications for side effects contributing to fall risk. Reinforce activity limits and safety measures with patient and family.

## 2019-03-28 NOTE — ED PROVIDER NOTE - PHYSICAL EXAMINATION
PHYSICAL EXAM:    General: No acute distress.  HEENT: Normocephalic, atraumatic.  PERRL.  EOMI.  No scleral icterus  Heart: RRR.  Normal S1 and S2.  No murmurs, rubs, or gallops.   Lungs: CTAB. No wheezes, crackles, or rhonchi.    Abdomen: BS+, soft, NT/ND.  No organomegaly.  Skin: Warm and dry.  No rashes.  Extremities: No edema, clubbing, or cyanosis.  2+ peripheral pulses b/l.  Musculoskeletal: No deformities.  No spinal or paraspinal tenderness.  Neuro: A&Ox3.  CN II-XII intact.  5/5 strength in UE and LE b/l.

## 2019-03-28 NOTE — ED PROVIDER NOTE - CLINICAL SUMMARY MEDICAL DECISION MAKING FREE TEXT BOX
77 y/o woman PMH of COPD (not O2 dependent), former smoker, HTN, HLD, CVA (no residual deficits), thoracic aortic aneurysm  p/w sour taste in her mouth. Labs, EKG, CE's. 79 y/o woman PMH of COPD (not O2 dependent), former smoker, HTN, HLD, CVA (no residual deficits), thoracic aortic aneurysm  p/w sour taste in her mouth. GI vs. Cardiac etiology. Labs, EKG, CE's.

## 2019-03-28 NOTE — ED ADULT NURSE NOTE - OBJECTIVE STATEMENT
Patient arrived to room 9, A&Ox3, respirations even and unlabored, skin warm and dry good for color, moves all extremities, skin intact, speaks in clear and full sentences. Patient arrives with complaints of sour taste in mouth after received CT with IV contrast, reports sour taste is worse in the morning, follows GI outpatient, complaints persist in addition to nausea. Denies chest pain, SOB, LOC, recent falls, abdominal pain. Left AC 20g IV placed labs drawn and sent. Hx CVA, patient report residual weakness, HTN, HDL, COPD not O2 dependant.

## 2019-03-28 NOTE — ED ADULT TRIAGE NOTE - CHIEF COMPLAINT QUOTE
Pt c/o generalized abd discomfort/nausea x 1 month after receiving ct scan w dye. Pt denies any recent worsening or change in symptoms.

## 2019-03-28 NOTE — ED PROVIDER NOTE - ATTENDING CONTRIBUTION TO CARE
79 yo female presents to ED for evaluation of sour taste in mouth every morning, been evaluated by GI, recommended to start PPI and drink fluids but states she is still experiencing same symptoms, has giovanni going on for several weeks. Denies chest pain or shortness of breath. Denies abd pain, nausea vomiting.     VS: mildly hypertensive, tachycardic     Gen: no acute distress, well appearing, awake, alert and oriented x 3  Head: normocephalic, atraumatic  Eyes: pupils equal round and reactive to light and accomodation, extraocular mucles intact, normal conjunctiva, no periorbital swelling  Ears, Nose Throat: tympanic membrane intact, normal turbinates, no septal hematoma, oropharynx nonerythematous, uvula midline, no tonsillar swelling or exudates, moist mucosa, neck supple with FROM, no lymphadenopathy, no masses, no JVD  Cardiac: regular rate and rhythm, +S1S2, no murmurs rubs or gallops  Pulm: Clear to auscultation bilaterally, equal air entry bilaterally, no wheezes/rales/rhonchibreath sounds  Abd: soft, nontender, nondistended, no guarding, rebound, neg Cross's sign, neg McBurney's point ttp  Neuro: awake, alert, oriented x 3, CN 2-12 grossly intact, sensorimotor intact    MDM  Elderly female with multiple medical problems, with sour taste in mouth, concern for GI vs cardiac etiology of symptoms. WIll check labs, ekg, imaging

## 2019-03-28 NOTE — ED PROVIDER NOTE - OBJECTIVE STATEMENT
79 y/o woman PMH of COPD (not O2 dependent), former smoker, HTN, HLD, CVA (no residual deficits), thoracic aortic aneurysm  p/w sour taste in her mouth. Pt reports undergoing CT w/ IV contrast last month to rule out 'clot in the lungs' after which pt reports experiencing a sour taste in her mouth every morning. She says this has never happened to her before. She was advised to drink fluids without any relief of her symptoms. She saw her Gastroenterologist who presribed her PPI w minor relief however she denied EGD. She reports waking up every AM w/ sour taste that generally improves throughout the day. She has band like lower abdominal pain and endorses recent constipation. No reported fevers, chills, n/v, CP, SOB, diarrhea or melena.

## 2019-03-28 NOTE — ED PROVIDER NOTE - NS ED ROS FT
REVIEW OF SYSTEMS:    CONSTITUTIONAL: Denies any fatigue, weakness, fevers or chills  EYES/ENT: No visual changes;  No vertigo or throat pain   NECK: No pain or stiffness  RESPIRATORY: No cough, wheezing, hemoptysis; No shortness of breath  CARDIOVASCULAR: No chest pain or palpitations  GASTROINTESTINAL: +dysgeusia, band like lower abdominal pain   GENITOURINARY: No dysuria, frequency or hematuria  NEUROLOGICAL: No numbness or weakness  SKIN: No itching, burning, rashes, or lesions   All other review of systems is negative unless indicated above.

## 2019-05-21 ENCOUNTER — OUTPATIENT (OUTPATIENT)
Dept: OUTPATIENT SERVICES | Facility: HOSPITAL | Age: 79
LOS: 1 days | End: 2019-05-21
Payer: MEDICARE

## 2019-05-21 ENCOUNTER — APPOINTMENT (OUTPATIENT)
Dept: ULTRASOUND IMAGING | Facility: IMAGING CENTER | Age: 79
End: 2019-05-21
Payer: MEDICARE

## 2019-05-21 ENCOUNTER — APPOINTMENT (OUTPATIENT)
Dept: CT IMAGING | Facility: IMAGING CENTER | Age: 79
End: 2019-05-21
Payer: MEDICARE

## 2019-05-21 DIAGNOSIS — Z98.890 OTHER SPECIFIED POSTPROCEDURAL STATES: Chronic | ICD-10-CM

## 2019-05-21 DIAGNOSIS — Z00.8 ENCOUNTER FOR OTHER GENERAL EXAMINATION: ICD-10-CM

## 2019-05-21 PROCEDURE — 71250 CT THORAX DX C-: CPT | Mod: 26

## 2019-05-21 PROCEDURE — 76775 US EXAM ABDO BACK WALL LIM: CPT

## 2019-05-21 PROCEDURE — 76770 US EXAM ABDO BACK WALL COMP: CPT | Mod: 26

## 2019-05-21 PROCEDURE — 71250 CT THORAX DX C-: CPT

## 2019-06-12 NOTE — ED ADULT NURSE NOTE - DURATION
[FreeTextEntry1] : Patient is a 77-year-old female history of bipolar disorder, chronic neck pain who presents today with complaints of balance impairment with mild to minimal right hip pain. Patient has been ambulating independently without an assistive device. Patient denies any focal motor weakness numbness, or bowel/ bladder incontinence. Patient currently denies neck pain or back pain. Patient does note history of left shoulder fracture and arthritis of the left shoulder. No falls report. hour(s)

## 2019-06-18 ENCOUNTER — APPOINTMENT (OUTPATIENT)
Age: 79
End: 2019-06-18
Payer: MEDICARE

## 2019-06-18 VITALS
OXYGEN SATURATION: 91 % | HEART RATE: 91 BPM | SYSTOLIC BLOOD PRESSURE: 123 MMHG | WEIGHT: 130 LBS | HEIGHT: 64 IN | TEMPERATURE: 98.5 F | BODY MASS INDEX: 22.2 KG/M2 | RESPIRATION RATE: 16 BRPM | DIASTOLIC BLOOD PRESSURE: 67 MMHG

## 2019-06-18 PROCEDURE — 99213 OFFICE O/P EST LOW 20 MIN: CPT | Mod: 25

## 2019-06-18 PROCEDURE — 94060 EVALUATION OF WHEEZING: CPT

## 2019-06-19 NOTE — HISTORY OF PRESENT ILLNESS
[FreeTextEntry1] : Hx of COPD\par On inhalers\par Complaint of morning dizziness and nausea. Resolves within 1-2 hours. Chronic exertional dyspnea.

## 2019-06-19 NOTE — ASSESSMENT
[FreeTextEntry1] : Severe COPD with FEV1 less than 1 L. Morning symptoms may be related to hypercapnia. Recommend arterial blood gas testing

## 2019-06-19 NOTE — PHYSICAL EXAM
[FreeTextEntry1] : Pleasant female no acute distress [Normal Conjunctiva] : the conjunctiva exhibited no abnormalities [Eyelids - No Xanthelasma] : the eyelids demonstrated no xanthelasmas [Normal Oropharynx] : normal oropharynx [Neck Appearance] : the appearance of the neck was normal [Neck Cervical Mass (___cm)] : no neck mass was observed [Jugular Venous Distention Increased] : there was no jugular-venous distention [Thyroid Diffuse Enlargement] : the thyroid was not enlarged [Thyroid Nodule] : there were no palpable thyroid nodules [Heart Rate And Rhythm] : heart rate and rhythm were normal [Heart Sounds] : normal S1 and S2 [Murmurs] : no murmurs present [Respiration, Rhythm And Depth] : normal respiratory rhythm and effort [Exaggerated Use Of Accessory Muscles For Inspiration] : no accessory muscle use [Auscultation Breath Sounds / Voice Sounds] : lungs were clear to auscultation bilaterally [Abdomen Soft] : soft [Abdomen Tenderness] : non-tender [Abnormal Walk] : normal gait [Abdomen Mass (___ Cm)] : no abdominal mass palpated [Gait - Sufficient For Exercise Testing] : the gait was sufficient for exercise testing [Nail Clubbing] : no clubbing of the fingernails [Cyanosis, Localized] : no localized cyanosis [Petechial Hemorrhages (___cm)] : no petechial hemorrhages [] : no ischemic changes [Deep Tendon Reflexes (DTR)] : deep tendon reflexes were 2+ and symmetric [Sensation] : the sensory exam was normal to light touch and pinprick [No Focal Deficits] : no focal deficits

## 2019-06-26 ENCOUNTER — APPOINTMENT (OUTPATIENT)
Dept: PULMONOLOGY | Facility: CLINIC | Age: 79
End: 2019-06-26
Payer: MEDICARE

## 2019-06-26 PROCEDURE — 82803 BLOOD GASES ANY COMBINATION: CPT

## 2019-06-26 PROCEDURE — 36600 WITHDRAWAL OF ARTERIAL BLOOD: CPT | Mod: 59

## 2019-07-23 ENCOUNTER — APPOINTMENT (OUTPATIENT)
Dept: PULMONOLOGY | Facility: CLINIC | Age: 79
End: 2019-07-23
Payer: MEDICARE

## 2019-07-23 VITALS
DIASTOLIC BLOOD PRESSURE: 71 MMHG | OXYGEN SATURATION: 90 % | SYSTOLIC BLOOD PRESSURE: 118 MMHG | WEIGHT: 130 LBS | TEMPERATURE: 98.4 F | BODY MASS INDEX: 25.52 KG/M2 | HEIGHT: 60 IN | HEART RATE: 94 BPM

## 2019-07-23 PROCEDURE — 99213 OFFICE O/P EST LOW 20 MIN: CPT | Mod: 25

## 2019-07-23 PROCEDURE — 36415 COLL VENOUS BLD VENIPUNCTURE: CPT

## 2019-07-24 LAB — A1AT SERPL-MCNC: 165 MG/DL

## 2019-07-25 NOTE — ASSESSMENT
[FreeTextEntry1] : Qualifies for oxygen based on resting pO2 of 54\par \par Will order oxygen for patient.\par \par Will obtain alpha testing in light of family history of COPD

## 2019-07-25 NOTE — PHYSICAL EXAM
[FreeTextEntry1] : Pleasant female no acute distress [Normal Conjunctiva] : the conjunctiva exhibited no abnormalities [Eyelids - No Xanthelasma] : the eyelids demonstrated no xanthelasmas [Normal Oropharynx] : normal oropharynx [Neck Appearance] : the appearance of the neck was normal [Neck Cervical Mass (___cm)] : no neck mass was observed [Jugular Venous Distention Increased] : there was no jugular-venous distention [Thyroid Diffuse Enlargement] : the thyroid was not enlarged [Thyroid Nodule] : there were no palpable thyroid nodules [Heart Rate And Rhythm] : heart rate and rhythm were normal [Heart Sounds] : normal S1 and S2 [Murmurs] : no murmurs present [Respiration, Rhythm And Depth] : normal respiratory rhythm and effort [Exaggerated Use Of Accessory Muscles For Inspiration] : no accessory muscle use [Auscultation Breath Sounds / Voice Sounds] : lungs were clear to auscultation bilaterally [Abdomen Soft] : soft [Abdomen Tenderness] : non-tender [Abdomen Mass (___ Cm)] : no abdominal mass palpated [Abnormal Walk] : normal gait [Gait - Sufficient For Exercise Testing] : the gait was sufficient for exercise testing [Nail Clubbing] : no clubbing of the fingernails [Cyanosis, Localized] : no localized cyanosis [Petechial Hemorrhages (___cm)] : no petechial hemorrhages [] : no ischemic changes [Deep Tendon Reflexes (DTR)] : deep tendon reflexes were 2+ and symmetric [Sensation] : the sensory exam was normal to light touch and pinprick [No Focal Deficits] : no focal deficits

## 2019-07-31 ENCOUNTER — MOBILE ON CALL (OUTPATIENT)
Age: 79
End: 2019-07-31

## 2019-08-02 LAB — SERPINA1 GENE MUT TESTED BLD/T: NORMAL

## 2019-08-07 ENCOUNTER — EMERGENCY (EMERGENCY)
Facility: HOSPITAL | Age: 79
LOS: 1 days | Discharge: HOME | End: 2019-08-07
Attending: EMERGENCY MEDICINE | Admitting: EMERGENCY MEDICINE
Payer: MEDICARE

## 2019-08-07 VITALS
TEMPERATURE: 98 F | RESPIRATION RATE: 16 BRPM | DIASTOLIC BLOOD PRESSURE: 70 MMHG | OXYGEN SATURATION: 93 % | HEART RATE: 90 BPM | SYSTOLIC BLOOD PRESSURE: 155 MMHG

## 2019-08-07 VITALS
HEART RATE: 87 BPM | DIASTOLIC BLOOD PRESSURE: 78 MMHG | RESPIRATION RATE: 20 BRPM | TEMPERATURE: 98 F | SYSTOLIC BLOOD PRESSURE: 156 MMHG | OXYGEN SATURATION: 97 %

## 2019-08-07 DIAGNOSIS — Z98.890 OTHER SPECIFIED POSTPROCEDURAL STATES: Chronic | ICD-10-CM

## 2019-08-07 LAB
ALBUMIN SERPL ELPH-MCNC: 4.2 G/DL — SIGNIFICANT CHANGE UP (ref 3.3–5)
ALP SERPL-CCNC: 67 U/L — SIGNIFICANT CHANGE UP (ref 40–120)
ALT FLD-CCNC: 10 U/L — SIGNIFICANT CHANGE UP (ref 4–33)
ANION GAP SERPL CALC-SCNC: 14 MMO/L — SIGNIFICANT CHANGE UP (ref 7–14)
APPEARANCE UR: CLEAR — SIGNIFICANT CHANGE UP
APTT BLD: 29.9 SEC — SIGNIFICANT CHANGE UP (ref 27.5–36.3)
AST SERPL-CCNC: 26 U/L — SIGNIFICANT CHANGE UP (ref 4–32)
BACTERIA # UR AUTO: NEGATIVE — SIGNIFICANT CHANGE UP
BASE EXCESS BLDV CALC-SCNC: 3.9 MMOL/L — SIGNIFICANT CHANGE UP
BASOPHILS # BLD AUTO: 0.03 K/UL — SIGNIFICANT CHANGE UP (ref 0–0.2)
BASOPHILS NFR BLD AUTO: 0.4 % — SIGNIFICANT CHANGE UP (ref 0–2)
BILIRUB SERPL-MCNC: 1.4 MG/DL — HIGH (ref 0.2–1.2)
BILIRUB UR-MCNC: NEGATIVE — SIGNIFICANT CHANGE UP
BLOOD GAS VENOUS - CREATININE: 1.37 MG/DL — HIGH (ref 0.5–1.3)
BLOOD GAS VENOUS - FIO2: 21 — SIGNIFICANT CHANGE UP
BLOOD UR QL VISUAL: NEGATIVE — SIGNIFICANT CHANGE UP
BUN SERPL-MCNC: 20 MG/DL — SIGNIFICANT CHANGE UP (ref 7–23)
CALCIUM SERPL-MCNC: 9.7 MG/DL — SIGNIFICANT CHANGE UP (ref 8.4–10.5)
CHLORIDE BLDV-SCNC: 105 MMOL/L — SIGNIFICANT CHANGE UP (ref 96–108)
CHLORIDE SERPL-SCNC: 102 MMOL/L — SIGNIFICANT CHANGE UP (ref 98–107)
CO2 SERPL-SCNC: 24 MMOL/L — SIGNIFICANT CHANGE UP (ref 22–31)
COLOR SPEC: SIGNIFICANT CHANGE UP
CREAT SERPL-MCNC: 1.34 MG/DL — HIGH (ref 0.5–1.3)
EOSINOPHIL # BLD AUTO: 0.26 K/UL — SIGNIFICANT CHANGE UP (ref 0–0.5)
EOSINOPHIL NFR BLD AUTO: 3.3 % — SIGNIFICANT CHANGE UP (ref 0–6)
GAS PNL BLDV: 140 MMOL/L — SIGNIFICANT CHANGE UP (ref 136–146)
GLUCOSE BLDV-MCNC: 111 MG/DL — HIGH (ref 70–99)
GLUCOSE SERPL-MCNC: 104 MG/DL — HIGH (ref 70–99)
GLUCOSE UR-MCNC: NEGATIVE — SIGNIFICANT CHANGE UP
HCO3 BLDV-SCNC: 26 MMOL/L — SIGNIFICANT CHANGE UP (ref 20–27)
HCT VFR BLD CALC: 44.2 % — SIGNIFICANT CHANGE UP (ref 34.5–45)
HCT VFR BLDV CALC: 44.9 % — SIGNIFICANT CHANGE UP (ref 34.5–45)
HGB BLD-MCNC: 14 G/DL — SIGNIFICANT CHANGE UP (ref 11.5–15.5)
HGB BLDV-MCNC: 14.6 G/DL — SIGNIFICANT CHANGE UP (ref 11.5–15.5)
HYALINE CASTS # UR AUTO: NEGATIVE — SIGNIFICANT CHANGE UP
IMM GRANULOCYTES NFR BLD AUTO: 0.1 % — SIGNIFICANT CHANGE UP (ref 0–1.5)
INR BLD: 1.09 — SIGNIFICANT CHANGE UP (ref 0.88–1.17)
KETONES UR-MCNC: NEGATIVE — SIGNIFICANT CHANGE UP
LACTATE BLDV-MCNC: 1.7 MMOL/L — SIGNIFICANT CHANGE UP (ref 0.5–2)
LEUKOCYTE ESTERASE UR-ACNC: SIGNIFICANT CHANGE UP
LIDOCAIN IGE QN: 46.9 U/L — SIGNIFICANT CHANGE UP (ref 7–60)
LYMPHOCYTES # BLD AUTO: 1.42 K/UL — SIGNIFICANT CHANGE UP (ref 1–3.3)
LYMPHOCYTES # BLD AUTO: 18.3 % — SIGNIFICANT CHANGE UP (ref 13–44)
MCHC RBC-ENTMCNC: 28.9 PG — SIGNIFICANT CHANGE UP (ref 27–34)
MCHC RBC-ENTMCNC: 31.7 % — LOW (ref 32–36)
MCV RBC AUTO: 91.1 FL — SIGNIFICANT CHANGE UP (ref 80–100)
MONOCYTES # BLD AUTO: 0.55 K/UL — SIGNIFICANT CHANGE UP (ref 0–0.9)
MONOCYTES NFR BLD AUTO: 7.1 % — SIGNIFICANT CHANGE UP (ref 2–14)
NEUTROPHILS # BLD AUTO: 5.51 K/UL — SIGNIFICANT CHANGE UP (ref 1.8–7.4)
NEUTROPHILS NFR BLD AUTO: 70.8 % — SIGNIFICANT CHANGE UP (ref 43–77)
NITRITE UR-MCNC: NEGATIVE — SIGNIFICANT CHANGE UP
NRBC # FLD: 0 K/UL — SIGNIFICANT CHANGE UP (ref 0–0)
PCO2 BLDV: 51 MMHG — SIGNIFICANT CHANGE UP (ref 41–51)
PH BLDV: 7.37 PH — SIGNIFICANT CHANGE UP (ref 7.32–7.43)
PH UR: 6.5 — SIGNIFICANT CHANGE UP (ref 5–8)
PLATELET # BLD AUTO: 281 K/UL — SIGNIFICANT CHANGE UP (ref 150–400)
PMV BLD: 11.2 FL — SIGNIFICANT CHANGE UP (ref 7–13)
PO2 BLDV: 29 MMHG — LOW (ref 35–40)
POTASSIUM BLDV-SCNC: 3.9 MMOL/L — SIGNIFICANT CHANGE UP (ref 3.4–4.5)
POTASSIUM SERPL-MCNC: 4.2 MMOL/L — SIGNIFICANT CHANGE UP (ref 3.5–5.3)
POTASSIUM SERPL-SCNC: 4.2 MMOL/L — SIGNIFICANT CHANGE UP (ref 3.5–5.3)
PROT SERPL-MCNC: 7.7 G/DL — SIGNIFICANT CHANGE UP (ref 6–8.3)
PROT UR-MCNC: NEGATIVE — SIGNIFICANT CHANGE UP
PROTHROM AB SERPL-ACNC: 12.1 SEC — SIGNIFICANT CHANGE UP (ref 9.8–13.1)
RBC # BLD: 4.85 M/UL — SIGNIFICANT CHANGE UP (ref 3.8–5.2)
RBC # FLD: 13.1 % — SIGNIFICANT CHANGE UP (ref 10.3–14.5)
RBC CASTS # UR COMP ASSIST: SIGNIFICANT CHANGE UP (ref 0–?)
SAO2 % BLDV: 48.9 % — LOW (ref 60–85)
SODIUM SERPL-SCNC: 140 MMOL/L — SIGNIFICANT CHANGE UP (ref 135–145)
SP GR SPEC: 1.01 — SIGNIFICANT CHANGE UP (ref 1–1.04)
SQUAMOUS # UR AUTO: SIGNIFICANT CHANGE UP
UROBILINOGEN FLD QL: NORMAL — SIGNIFICANT CHANGE UP
WBC # BLD: 7.78 K/UL — SIGNIFICANT CHANGE UP (ref 3.8–10.5)
WBC # FLD AUTO: 7.78 K/UL — SIGNIFICANT CHANGE UP (ref 3.8–10.5)
WBC UR QL: SIGNIFICANT CHANGE UP (ref 0–?)

## 2019-08-07 PROCEDURE — 99284 EMERGENCY DEPT VISIT MOD MDM: CPT

## 2019-08-07 PROCEDURE — 74177 CT ABD & PELVIS W/CONTRAST: CPT | Mod: 26

## 2019-08-07 PROCEDURE — 70450 CT HEAD/BRAIN W/O DYE: CPT | Mod: 26

## 2019-08-07 NOTE — ED PROVIDER NOTE - ATTENDING CONTRIBUTION TO CARE
Dr Bloch, ATTENDING MD-  I performed a face to face bedside interview with patient regarding history of present illness, review of symptoms and past medical history. I completed an independent physical exam.  I have discussed patient's plan of care with PA.   Documentation as above in the note.  Patient examined, well appearing NAD HEENT nml heart sounds nml lungs clear abd firm, tender RLQ, no CVA tenderness, pulses intact. ecg NSR,neuro nonfocal

## 2019-08-07 NOTE — ED ADULT TRIAGE NOTE - CHIEF COMPLAINT QUOTE
pt coming from home by EMS for nausea, denies ABD pain, no vomiting, no dysuria,           daughter : Rosalinda, pt coming from home by EMS for nausea, denies ABD pain, no vomiting, no dysuria,           daughter : Rosalinda, [phone]

## 2019-08-07 NOTE — ED PROVIDER NOTE - CLINICAL SUMMARY MEDICAL DECISION MAKING FREE TEXT BOX
79 y.o female pmhx of HTN, CVA, on Eliquis coming in with weeks of nausea, states she has been eating without issue, no vomiting, abdominal pain or diarrhea. Will check labs, lipase, EKG, ua, CT abdomen and pelvis, CT head, reassess. 79 y.o female pmhx of HTN, CVA, on Eliquis coming in with weeks of nausea, states she has been eating without issue, no vomiting, abdominal pain or diarrhea. Will check labs, lipase, EKG, ua, CT abdomen and pelvis, CT head, reassess.  Labs and imaging showing no acute abnormality, will DC with outpatient follow up.  Discussed with daughter as well, pt understood and agreed.

## 2019-08-07 NOTE — ED PROVIDER NOTE - PROGRESS NOTE DETAILS
Nanette-PGY1: pt seen and re-evaluated at bedside.  Pt states their symptoms have improved.  Pt comfortable in NAD.  Labs normal, pt states she feels well and is ready for DC.  Will DC with neurologist, GI, and PCP follow up.

## 2019-08-07 NOTE — ED PROVIDER NOTE - OBJECTIVE STATEMENT
79 y.o female pmhx of HTN, CVA, coming in with weeks of nausea, states she has been eating without issue, no vomiting, abdominal pain or diarrhea. States constipated but normal for her, she is on senna stool softeners.  Denies fevers, chills, chest pain, sob, cough, vomiting, diarrhea, abdominal pain, urinary symptoms. 79 y.o female pmhx of HTN, CVA, on eliquis coming in with weeks of nausea, states she has been eating without issue, no vomiting, abdominal pain or diarrhea. States constipated but normal for her, she is on senna stool softeners. Pt also states at baseline walks with walker but states has felt a little difficulty walking around.  Denies fevers, chills, chest pain, sob, cough, vomiting, diarrhea, abdominal pain, urinary symptoms.

## 2019-08-07 NOTE — ED ADULT NURSE NOTE - CHPI ED NUR SYMPTOMS NEG
no chills/no decreased eating/drinking/no weakness/no pain/no vomiting/no dizziness/no fever/no tingling

## 2019-08-07 NOTE — ED ADULT NURSE NOTE - OBJECTIVE STATEMENT
gisel RN: pt received in spot 28. complains of nausea all week. denies any vomiting. denies any abd pain. states has also been constipated, had a small bm yesterday that was very hard. denies any other complaints at this time. labs sent. awaits ct in no acute distress with family at bedside. endorsed to primary RN.

## 2019-08-07 NOTE — ED ADULT NURSE NOTE - CHIEF COMPLAINT QUOTE
pt coming from home by EMS for nausea, denies ABD pain, no vomiting, no dysuria,           daughter : Rosalinda, [phone]

## 2019-08-08 RX ORDER — POLYETHYLENE GLYCOL 3350 17 G/17G
17 POWDER, FOR SOLUTION ORAL
Qty: 120 | Refills: 0
Start: 2019-08-08 | End: 2019-08-14

## 2019-11-11 ENCOUNTER — FORM ENCOUNTER (OUTPATIENT)
Age: 79
End: 2019-11-11

## 2019-11-12 ENCOUNTER — APPOINTMENT (OUTPATIENT)
Dept: PULMONOLOGY | Facility: CLINIC | Age: 79
End: 2019-11-12
Payer: MEDICARE

## 2019-11-12 VITALS
DIASTOLIC BLOOD PRESSURE: 68 MMHG | SYSTOLIC BLOOD PRESSURE: 105 MMHG | TEMPERATURE: 97.8 F | HEART RATE: 99 BPM | OXYGEN SATURATION: 89 % | RESPIRATION RATE: 16 BRPM

## 2019-11-12 PROCEDURE — 94060 EVALUATION OF WHEEZING: CPT

## 2019-11-12 PROCEDURE — 99213 OFFICE O/P EST LOW 20 MIN: CPT | Mod: 25

## 2019-11-14 NOTE — ASSESSMENT
[FreeTextEntry1] : Chronic dyspnea secondary to COPD\par Encouraged to use oxygen\par Minimum 8 hours out of 24\par poorly motivated

## 2019-11-14 NOTE — HISTORY OF PRESENT ILLNESS
[FreeTextEntry1] : Followup for COPD\par Chronically dyspneic\par Prescribed oxygen-not really using

## 2019-11-14 NOTE — PHYSICAL EXAM
[Normal Conjunctiva] : the conjunctiva exhibited no abnormalities [FreeTextEntry1] : Pleasant female no acute distress [Eyelids - No Xanthelasma] : the eyelids demonstrated no xanthelasmas [Normal Oropharynx] : normal oropharynx [Neck Appearance] : the appearance of the neck was normal [Neck Cervical Mass (___cm)] : no neck mass was observed [Jugular Venous Distention Increased] : there was no jugular-venous distention [Thyroid Diffuse Enlargement] : the thyroid was not enlarged [Thyroid Nodule] : there were no palpable thyroid nodules [Heart Rate And Rhythm] : heart rate and rhythm were normal [Heart Sounds] : normal S1 and S2 [Murmurs] : no murmurs present [Respiration, Rhythm And Depth] : normal respiratory rhythm and effort [Exaggerated Use Of Accessory Muscles For Inspiration] : no accessory muscle use [Auscultation Breath Sounds / Voice Sounds] : lungs were clear to auscultation bilaterally [Abdomen Soft] : soft [Abdomen Tenderness] : non-tender [Abdomen Mass (___ Cm)] : no abdominal mass palpated [Abnormal Walk] : normal gait [Gait - Sufficient For Exercise Testing] : the gait was sufficient for exercise testing [Nail Clubbing] : no clubbing of the fingernails [Cyanosis, Localized] : no localized cyanosis [] : no ischemic changes [Petechial Hemorrhages (___cm)] : no petechial hemorrhages [Deep Tendon Reflexes (DTR)] : deep tendon reflexes were 2+ and symmetric [Sensation] : the sensory exam was normal to light touch and pinprick [No Focal Deficits] : no focal deficits

## 2019-11-26 ENCOUNTER — EMERGENCY (EMERGENCY)
Facility: HOSPITAL | Age: 79
LOS: 1 days | Discharge: HOME | End: 2019-11-26
Attending: EMERGENCY MEDICINE | Admitting: EMERGENCY MEDICINE
Payer: MEDICARE

## 2019-11-26 VITALS
DIASTOLIC BLOOD PRESSURE: 44 MMHG | TEMPERATURE: 98 F | HEART RATE: 100 BPM | OXYGEN SATURATION: 98 % | RESPIRATION RATE: 22 BRPM | SYSTOLIC BLOOD PRESSURE: 125 MMHG

## 2019-11-26 VITALS
RESPIRATION RATE: 18 BRPM | SYSTOLIC BLOOD PRESSURE: 157 MMHG | DIASTOLIC BLOOD PRESSURE: 65 MMHG | HEART RATE: 100 BPM | OXYGEN SATURATION: 100 %

## 2019-11-26 DIAGNOSIS — Z98.890 OTHER SPECIFIED POSTPROCEDURAL STATES: Chronic | ICD-10-CM

## 2019-11-26 LAB
ALBUMIN SERPL ELPH-MCNC: 3.8 G/DL — SIGNIFICANT CHANGE UP (ref 3.3–5)
ALP SERPL-CCNC: 66 U/L — SIGNIFICANT CHANGE UP (ref 40–120)
ALT FLD-CCNC: 11 U/L — SIGNIFICANT CHANGE UP (ref 4–33)
ANION GAP SERPL CALC-SCNC: 12 MMO/L — SIGNIFICANT CHANGE UP (ref 7–14)
AST SERPL-CCNC: 21 U/L — SIGNIFICANT CHANGE UP (ref 4–32)
BILIRUB SERPL-MCNC: 1.1 MG/DL — SIGNIFICANT CHANGE UP (ref 0.2–1.2)
BUN SERPL-MCNC: 20 MG/DL — SIGNIFICANT CHANGE UP (ref 7–23)
CALCIUM SERPL-MCNC: 9 MG/DL — SIGNIFICANT CHANGE UP (ref 8.4–10.5)
CHLORIDE SERPL-SCNC: 101 MMOL/L — SIGNIFICANT CHANGE UP (ref 98–107)
CO2 SERPL-SCNC: 26 MMOL/L — SIGNIFICANT CHANGE UP (ref 22–31)
CREAT SERPL-MCNC: 1.26 MG/DL — SIGNIFICANT CHANGE UP (ref 0.5–1.3)
GLUCOSE SERPL-MCNC: 105 MG/DL — HIGH (ref 70–99)
HCT VFR BLD CALC: 39.9 % — SIGNIFICANT CHANGE UP (ref 34.5–45)
HGB BLD-MCNC: 12 G/DL — SIGNIFICANT CHANGE UP (ref 11.5–15.5)
MCHC RBC-ENTMCNC: 28.2 PG — SIGNIFICANT CHANGE UP (ref 27–34)
MCHC RBC-ENTMCNC: 30.1 % — LOW (ref 32–36)
MCV RBC AUTO: 93.7 FL — SIGNIFICANT CHANGE UP (ref 80–100)
NRBC # FLD: 0 K/UL — SIGNIFICANT CHANGE UP (ref 0–0)
NT-PROBNP SERPL-SCNC: 467.3 PG/ML — SIGNIFICANT CHANGE UP
PLATELET # BLD AUTO: 301 K/UL — SIGNIFICANT CHANGE UP (ref 150–400)
PMV BLD: 11.1 FL — SIGNIFICANT CHANGE UP (ref 7–13)
POTASSIUM SERPL-MCNC: 4.6 MMOL/L — SIGNIFICANT CHANGE UP (ref 3.5–5.3)
POTASSIUM SERPL-SCNC: 4.6 MMOL/L — SIGNIFICANT CHANGE UP (ref 3.5–5.3)
PROT SERPL-MCNC: 7 G/DL — SIGNIFICANT CHANGE UP (ref 6–8.3)
RBC # BLD: 4.26 M/UL — SIGNIFICANT CHANGE UP (ref 3.8–5.2)
RBC # FLD: 14.3 % — SIGNIFICANT CHANGE UP (ref 10.3–14.5)
SODIUM SERPL-SCNC: 139 MMOL/L — SIGNIFICANT CHANGE UP (ref 135–145)
TROPONIN T, HIGH SENSITIVITY: 22 NG/L — SIGNIFICANT CHANGE UP (ref ?–14)
TROPONIN T, HIGH SENSITIVITY: 25 NG/L — SIGNIFICANT CHANGE UP (ref ?–14)
WBC # BLD: 8.13 K/UL — SIGNIFICANT CHANGE UP (ref 3.8–10.5)
WBC # FLD AUTO: 8.13 K/UL — SIGNIFICANT CHANGE UP (ref 3.8–10.5)

## 2019-11-26 PROCEDURE — 99284 EMERGENCY DEPT VISIT MOD MDM: CPT | Mod: 25,GC

## 2019-11-26 PROCEDURE — 71045 X-RAY EXAM CHEST 1 VIEW: CPT | Mod: 26

## 2019-11-26 PROCEDURE — 93010 ELECTROCARDIOGRAM REPORT: CPT

## 2019-11-26 RX ORDER — IPRATROPIUM/ALBUTEROL SULFATE 18-103MCG
3 AEROSOL WITH ADAPTER (GRAM) INHALATION ONCE
Refills: 0 | Status: COMPLETED | OUTPATIENT
Start: 2019-11-26 | End: 2019-11-26

## 2019-11-26 RX ORDER — EPINEPHRINE 0.3 MG/.3ML
3 INJECTION INTRAMUSCULAR; SUBCUTANEOUS
Qty: 122 | Refills: 0
Start: 2019-11-26 | End: 2019-12-09

## 2019-11-26 RX ADMIN — Medication 50 MILLIGRAM(S): at 14:53

## 2019-11-26 RX ADMIN — Medication 3 MILLILITER(S): at 14:52

## 2019-11-26 NOTE — ED ADULT NURSE NOTE - OBJECTIVE STATEMENT
Patient received to room 16 c/o SOB. Patient states she has been feeling dizzy and feels like she can't breathe. States she went to her doctor yesterday who told her to be on oxygen 8 hours a day, pt is noncompliant with home 02. 88% on RA, currently 95% on 2L NC. Denies CP, no N/V/D. No peripheral edema noted. Pt is poor historian. VS as noted. Awaiting further orders, will continue to monitor.

## 2019-11-26 NOTE — ED PROVIDER NOTE - PATIENT PORTAL LINK FT
You can access the FollowMyHealth Patient Portal offered by [facility] by registering at the following website: [email or web address]. By joining [employer]s FollowMyHealth portal, you will also be able to view your health information using other applications (apps) compatible with our system.

## 2019-11-26 NOTE — ED PROVIDER NOTE - NS ED ROS FT
GENERAL: No fever or chills, EYES: no change in vision, HEENT: no trouble speaking, CARDIAC: no chest pain, palpitation PULMONARY: +productive cough or +SOB, GI: no abdominal pain, no nausea, no vomiting, no diarrhea or constipation, : No changes in urination, SKIN: no rashes, NEURO: no headache,  MSK: No muscle pain ~Luis Calderon MD

## 2019-11-26 NOTE — ED PROVIDER NOTE - CLINICAL SUMMARY MEDICAL DECISION MAKING FREE TEXT BOX
Luis Calderon MD:  79 y.o female pmhx of HTN, CVA, on eliquis, COPD on O2 home for 5 hrs out day p/w nonexertional SOB x 1yr. Pt report productive cough for 3 months. 79 y.o female pmhx of HTN, CVA, on eliquis, COPD on O2 home for 5 hrs out day p/w nonexertional SOB x 1yr. Pt report productive cough for 3 months. Concern for COPD Exacerbation/PNA. Labs, Albuterol, CXR.

## 2019-11-26 NOTE — ED PROVIDER NOTE - ATTENDING CONTRIBUTION TO CARE
Pt was seen and evaluated by me. Pt is a 80 y/o female with PMHx of HTN, CVA on eliquis, COPD on O2 home for 5 hrs a day who presented to the ED for SOB X 1 yr. Pt states over the past year having SOB and being on O2 for 5hrs a day. Pt admits to productive cough X 3 months. Pt denies any fever, chills, nausea, vomiting, chest pain, or abd pain. Lungs CTA b/l. No wheezing. RRR. Abd soft, non-tender. No calf tenderness or swelling.

## 2019-11-26 NOTE — ED PROVIDER NOTE - PROGRESS NOTE DETAILS
Luis Calderon MD: CXR clear, Pt well appearing and asymptomatic. Pt is and tolerating PO. Spoke with pt about return precautions. Pt agrees to follow up with their PCP. Pt ready for discharge

## 2019-11-26 NOTE — ED PROVIDER NOTE - NSFOLLOWUPINSTRUCTIONS_ED_ALL_ED_FT
1) Please follow-up with your primary care doctor in the next 2-3 days.  Please call tomorrow for an appointment.  If you cannot follow-up with your primary care doctor please return to the ED for any urgent issues. Please also follow up with your Cardiologist in 2-5 days. If you don't have a cardiologist the number to [facility] cardiology is provided above  2) You were given a copy of the tests performed today.  Please bring the results with you and review them with your primary care doctor.  3) If you have any worsening of symptoms or any other concerns please return to the ED immediately. Such as but not limited to including chest pain, shortness or breath, or weakness.  4) Please continue taking your home medications as directed.

## 2019-11-26 NOTE — ED PROVIDER NOTE - OBJECTIVE STATEMENT
Luis Calderon MD:  79 y.o female pmhx of HTN, CVA, on eliquis, COPD on O2 home for 5 hrs out day p/w nonexertional SOB x 1yr. Pt report productive cough for 3 months. Pt states that since she fell and broke her ribs. she has  not been able to walk and her breathing has went down hill. Pt also c/o a poison taste on her dentures x 3 night whist makes her feel sick and nausea. Denies fevers, CP,  abd pain blood in stool, dysuria, visual changes .

## 2020-02-02 ENCOUNTER — EMERGENCY (EMERGENCY)
Facility: HOSPITAL | Age: 80
LOS: 1 days | Discharge: HOME | End: 2020-02-02
Attending: EMERGENCY MEDICINE | Admitting: EMERGENCY MEDICINE
Payer: MEDICARE

## 2020-02-02 VITALS
HEART RATE: 87 BPM | OXYGEN SATURATION: 100 % | TEMPERATURE: 98 F | RESPIRATION RATE: 16 BRPM | SYSTOLIC BLOOD PRESSURE: 136 MMHG | DIASTOLIC BLOOD PRESSURE: 61 MMHG

## 2020-02-02 VITALS
RESPIRATION RATE: 18 BRPM | DIASTOLIC BLOOD PRESSURE: 65 MMHG | TEMPERATURE: 98 F | SYSTOLIC BLOOD PRESSURE: 123 MMHG | OXYGEN SATURATION: 99 % | HEART RATE: 101 BPM

## 2020-02-02 DIAGNOSIS — Z98.890 OTHER SPECIFIED POSTPROCEDURAL STATES: Chronic | ICD-10-CM

## 2020-02-02 LAB
ALBUMIN SERPL ELPH-MCNC: 3.8 G/DL — SIGNIFICANT CHANGE UP (ref 3.3–5)
ALP SERPL-CCNC: 63 U/L — SIGNIFICANT CHANGE UP (ref 40–120)
ALT FLD-CCNC: 12 U/L — SIGNIFICANT CHANGE UP (ref 4–33)
ANION GAP SERPL CALC-SCNC: 14 MMO/L — SIGNIFICANT CHANGE UP (ref 7–14)
APPEARANCE UR: CLEAR — SIGNIFICANT CHANGE UP
AST SERPL-CCNC: 33 U/L — HIGH (ref 4–32)
BACTERIA # UR AUTO: HIGH
BASOPHILS # BLD AUTO: 0.04 K/UL — SIGNIFICANT CHANGE UP (ref 0–0.2)
BASOPHILS NFR BLD AUTO: 0.6 % — SIGNIFICANT CHANGE UP (ref 0–2)
BILIRUB SERPL-MCNC: 1 MG/DL — SIGNIFICANT CHANGE UP (ref 0.2–1.2)
BILIRUB UR-MCNC: NEGATIVE — SIGNIFICANT CHANGE UP
BLOOD UR QL VISUAL: NEGATIVE — SIGNIFICANT CHANGE UP
BUN SERPL-MCNC: 22 MG/DL — SIGNIFICANT CHANGE UP (ref 7–23)
CALCIUM SERPL-MCNC: 9.3 MG/DL — SIGNIFICANT CHANGE UP (ref 8.4–10.5)
CHLORIDE SERPL-SCNC: 102 MMOL/L — SIGNIFICANT CHANGE UP (ref 98–107)
CO2 SERPL-SCNC: 24 MMOL/L — SIGNIFICANT CHANGE UP (ref 22–31)
COLOR SPEC: COLORLESS — SIGNIFICANT CHANGE UP
CREAT SERPL-MCNC: 1.15 MG/DL — SIGNIFICANT CHANGE UP (ref 0.5–1.3)
EOSINOPHIL # BLD AUTO: 0.27 K/UL — SIGNIFICANT CHANGE UP (ref 0–0.5)
EOSINOPHIL NFR BLD AUTO: 3.8 % — SIGNIFICANT CHANGE UP (ref 0–6)
GLUCOSE SERPL-MCNC: 101 MG/DL — HIGH (ref 70–99)
GLUCOSE UR-MCNC: NEGATIVE — SIGNIFICANT CHANGE UP
HCT VFR BLD CALC: 37 % — SIGNIFICANT CHANGE UP (ref 34.5–45)
HGB BLD-MCNC: 11.6 G/DL — SIGNIFICANT CHANGE UP (ref 11.5–15.5)
HYALINE CASTS # UR AUTO: NEGATIVE — SIGNIFICANT CHANGE UP
IMM GRANULOCYTES NFR BLD AUTO: 0.6 % — SIGNIFICANT CHANGE UP (ref 0–1.5)
KETONES UR-MCNC: NEGATIVE — SIGNIFICANT CHANGE UP
LEUKOCYTE ESTERASE UR-ACNC: SIGNIFICANT CHANGE UP
LYMPHOCYTES # BLD AUTO: 1.57 K/UL — SIGNIFICANT CHANGE UP (ref 1–3.3)
LYMPHOCYTES # BLD AUTO: 22.2 % — SIGNIFICANT CHANGE UP (ref 13–44)
MCHC RBC-ENTMCNC: 28.1 PG — SIGNIFICANT CHANGE UP (ref 27–34)
MCHC RBC-ENTMCNC: 31.4 % — LOW (ref 32–36)
MCV RBC AUTO: 89.6 FL — SIGNIFICANT CHANGE UP (ref 80–100)
MONOCYTES # BLD AUTO: 0.52 K/UL — SIGNIFICANT CHANGE UP (ref 0–0.9)
MONOCYTES NFR BLD AUTO: 7.4 % — SIGNIFICANT CHANGE UP (ref 2–14)
NEUTROPHILS # BLD AUTO: 4.63 K/UL — SIGNIFICANT CHANGE UP (ref 1.8–7.4)
NEUTROPHILS NFR BLD AUTO: 65.4 % — SIGNIFICANT CHANGE UP (ref 43–77)
NITRITE UR-MCNC: POSITIVE — HIGH
NRBC # FLD: 0 K/UL — SIGNIFICANT CHANGE UP (ref 0–0)
NT-PROBNP SERPL-SCNC: 198.8 PG/ML — SIGNIFICANT CHANGE UP
PH UR: 7.5 — SIGNIFICANT CHANGE UP (ref 5–8)
PLATELET # BLD AUTO: 300 K/UL — SIGNIFICANT CHANGE UP (ref 150–400)
PMV BLD: 10.5 FL — SIGNIFICANT CHANGE UP (ref 7–13)
POTASSIUM SERPL-MCNC: 5 MMOL/L — SIGNIFICANT CHANGE UP (ref 3.5–5.3)
POTASSIUM SERPL-SCNC: 5 MMOL/L — SIGNIFICANT CHANGE UP (ref 3.5–5.3)
PROT SERPL-MCNC: 7.2 G/DL — SIGNIFICANT CHANGE UP (ref 6–8.3)
PROT UR-MCNC: NEGATIVE — SIGNIFICANT CHANGE UP
RBC # BLD: 4.13 M/UL — SIGNIFICANT CHANGE UP (ref 3.8–5.2)
RBC # FLD: 14.2 % — SIGNIFICANT CHANGE UP (ref 10.3–14.5)
RBC CASTS # UR COMP ASSIST: SIGNIFICANT CHANGE UP (ref 0–?)
SODIUM SERPL-SCNC: 140 MMOL/L — SIGNIFICANT CHANGE UP (ref 135–145)
SP GR SPEC: 1.01 — SIGNIFICANT CHANGE UP (ref 1–1.04)
SQUAMOUS # UR AUTO: SIGNIFICANT CHANGE UP
TROPONIN T, HIGH SENSITIVITY: 29 NG/L — SIGNIFICANT CHANGE UP (ref ?–14)
TROPONIN T, HIGH SENSITIVITY: 32 NG/L — SIGNIFICANT CHANGE UP (ref ?–14)
UROBILINOGEN FLD QL: NORMAL — SIGNIFICANT CHANGE UP
WBC # BLD: 7.07 K/UL — SIGNIFICANT CHANGE UP (ref 3.8–10.5)
WBC # FLD AUTO: 7.07 K/UL — SIGNIFICANT CHANGE UP (ref 3.8–10.5)
WBC UR QL: HIGH (ref 0–?)

## 2020-02-02 PROCEDURE — 71046 X-RAY EXAM CHEST 2 VIEWS: CPT | Mod: 26

## 2020-02-02 PROCEDURE — 99284 EMERGENCY DEPT VISIT MOD MDM: CPT | Mod: 25,GC

## 2020-02-02 PROCEDURE — 93010 ELECTROCARDIOGRAM REPORT: CPT

## 2020-02-02 RX ORDER — ONDANSETRON 8 MG/1
4 TABLET, FILM COATED ORAL ONCE
Refills: 0 | Status: COMPLETED | OUTPATIENT
Start: 2020-02-02 | End: 2020-02-02

## 2020-02-02 RX ORDER — ONDANSETRON 8 MG/1
1 TABLET, FILM COATED ORAL
Qty: 15 | Refills: 0
Start: 2020-02-02 | End: 2020-02-06

## 2020-02-02 RX ORDER — CEPHALEXIN 500 MG
500 CAPSULE ORAL ONCE
Refills: 0 | Status: COMPLETED | OUTPATIENT
Start: 2020-02-02 | End: 2020-02-02

## 2020-02-02 RX ORDER — CEPHALEXIN 500 MG
1 CAPSULE ORAL
Qty: 20 | Refills: 0
Start: 2020-02-02 | End: 2020-02-11

## 2020-02-02 RX ADMIN — Medication 500 MILLIGRAM(S): at 19:08

## 2020-02-02 RX ADMIN — ONDANSETRON 4 MILLIGRAM(S): 8 TABLET, FILM COATED ORAL at 15:35

## 2020-02-02 NOTE — ED PROVIDER NOTE - ATTENDING CONTRIBUTION TO CARE
Pt with multiple comorbidities as described above presents with chronic complaints of nausea, lightheadedness, unsteadiness x2 years after a fall. Pt has not had recent falls, abd s/nt, EKG WNL, pending labs, symptom control. Pt tachy to 110s NSR, ?dehydration, will reassess after IVF. Will discuss with her PCP regarding outpt f/u

## 2020-02-02 NOTE — ED ADULT NURSE NOTE - NSIMPLEMENTINTERV_GEN_ALL_ED
Implemented All Fall Risk Interventions:  [place] to call system. Call bell, personal items and telephone within reach. Instruct patient to call for assistance. Room bathroom lighting operational. Non-slip footwear when patient is off stretcher. Physically safe environment: no spills, clutter or unnecessary equipment. Stretcher in lowest position, wheels locked, appropriate side rails in place. Provide visual cue, wrist band, yellow gown, etc. Monitor gait and stability. Monitor for mental status changes and reorient to person, place, and time. Review medications for side effects contributing to fall risk. Reinforce activity limits and safety measures with patient and family.

## 2020-02-02 NOTE — ED PROVIDER NOTE - OBJECTIVE STATEMENT
80 y/o woman PMH of COPD ( home O2 dependent for 5-8hrs per day), former smoker, HTN, HLD, CVA (no residual deficits), thoracic aortic aneurysm, possible carotid artery stent placement? (1.5 yrs ago at [place], pt unable to clarify "they put a stent in my neck"), chronic nausea w/ multiple ER visits for symptoms related to chronic nausea with ambulation that has been present "since I fell 2 years ago", pw worsening nausea over the last month. Pt reports multiple triggering factors that make her nauseous or dizzy including ambulation, using her home O2, taking certain medications, etc. She has been taking PO reglan at home for her nausea w/ initial relief that is now becoming less effective. She states she was feeling very "crummy" yesterday which prompted her to come to the ER today.

## 2020-02-02 NOTE — ED PROVIDER NOTE - PROGRESS NOTE DETAILS
Derrell, pgy3: spoke to Dr. Cristobal who is primary care and pulmonology for patient, he agrees that current presentation sounds acute on chronic and based on bloodwork Derrell,pgy3: UTI on UA, will tx w/ PO antibx as this could be exacerbating her chronic nausea and explain the worsening recently. Pt otherwise stable for dc, informed daughter Hannah [phone]

## 2020-02-02 NOTE — ED PROVIDER NOTE - NSFOLLOWUPINSTRUCTIONS_ED_ALL_ED_FT
YOU HAVE A URINARY TRACT INFECTION, THIS MAY BE WORSENING YOUR CHRONIC NAUSEA    WE ARE SENDING SOME ANTIBIOTICS TO YOUR PHARMACY THAT WILL TREAT THIS INFECTION    WE ARE ALSO SENDING A TRIAL OF SOME NEW ANTI-NAUSEA MEDICATION CALLED ZOFRAN TO YOUR PHARMACY, DON'T TAKE THIS AT THE SAME TIME AS YOUR ODANSETRON, BUT YOU CAN TRY ALTERNATION BETWEEN THE TWO TO TREAT YOU NAUSEA    FOLLOW UP WITH YOUR PRIMARY DOCTORS SOON AND SHOW THEM THE PAPERS FROM TODAY

## 2020-02-02 NOTE — ED PROVIDER NOTE - CLINICAL SUMMARY MEDICAL DECISION MAKING FREE TEXT BOX
79F with multiple comorbidites presents with multiple chronic issues going on for two years including n/v, lightehadedness. No abd pain, normal exam, pt well appearing, will check basic labs and have pt f/u with PCP.

## 2020-02-02 NOTE — ED PROVIDER NOTE - CARE PLAN
Principal Discharge DX:	Nausea Principal Discharge DX:	Nausea  Secondary Diagnosis:	UTI (urinary tract infection)

## 2020-02-02 NOTE — ED ADULT NURSE NOTE - OBJECTIVE STATEMENT
Pt. A&Ox4, c/o nausea and dizziness x 1 month. States symptoms are everyday. Also c/o sob worsening on ambulation since she had a fall 2years ago. h/o cva and COPD (on 2L NC PRN). Denies cp, palliations, abdominal pain, diarrhea, fevers. #20g IV placed to left AC, labs sent. MD at bedside for eval. VSS, breathing well on RA. Respirations even and unlabored. Will continue to monitor. Pt. A&Ox4, c/o nausea and dizziness x 1 month. States symptoms are everyday. Also c/o sob worsening on ambulation since she had a fall 2years ago. h/o cva and COPD (on 2L NC PRN). b/l LE +1 pitting LE edema noted. Denies cp, palliations, abdominal pain, diarrhea, fevers. #20g IV placed to left AC, labs sent. MD at bedside for eval. VSS, breathing well on RA. Respirations even and unlabored. Will continue to monitor.

## 2020-02-02 NOTE — ED ADULT TRIAGE NOTE - CHIEF COMPLAINT QUOTE
Pt brought in by ambulance from home for eval of worsening nausea for the past month. Pt states that today she had excessive nausea and dizziness with positional changes. Pt denies chest pain or pressure. States that all of her symptoms began 2 years ago s/p fall and rib fracture.

## 2020-02-02 NOTE — ED POST DISCHARGE NOTE - OTHER COMMUNICATION
SAV RAY: received call from pt requesting discharge paper fax to her daughter; states she lost the paper work; fax #[phone].

## 2020-02-02 NOTE — ED PROVIDER NOTE - PATIENT PORTAL LINK FT
You can access the FollowMyHealth Patient Portal offered by [facility] by registering at the following website: [email or web address]. By joining [facility]’s FollowMyHealth portal, you will also be able to view your health information using other applications (apps) compatible with our system.

## 2020-02-02 NOTE — ED PROVIDER NOTE - PHYSICAL EXAMINATION
GEN - NAD; well appearing; A+O x3   HEAD - NC/AT   EYES- PERRL, EOMI  ENT: Airway patent, mmm  NECK: Neck supple  PULMONARY - CTA b/l, symmetric breath sounds.   CARDIAC -s1s2, RRR, no M,G,R  ABDOMEN - +BS, ND, NT, soft, no guarding, no rebound, no masses   NEUROLOGIC - alert, speech clear, normal gait  PSYCH -nl mood/affect, nl insight.

## 2020-02-03 LAB — SPECIMEN SOURCE: SIGNIFICANT CHANGE UP

## 2020-02-04 LAB
-  AMIKACIN: SIGNIFICANT CHANGE UP
-  AMPICILLIN/SULBACTAM: SIGNIFICANT CHANGE UP
-  AMPICILLIN: SIGNIFICANT CHANGE UP
-  AZTREONAM: SIGNIFICANT CHANGE UP
-  CEFAZOLIN: SIGNIFICANT CHANGE UP
-  CEFEPIME: SIGNIFICANT CHANGE UP
-  CEFOXITIN: SIGNIFICANT CHANGE UP
-  CEFTAZIDIME: SIGNIFICANT CHANGE UP
-  CEFTRIAXONE: SIGNIFICANT CHANGE UP
-  ERTAPENEM: SIGNIFICANT CHANGE UP
-  GENTAMICIN: SIGNIFICANT CHANGE UP
-  IMIPENEM: SIGNIFICANT CHANGE UP
-  LEVOFLOXACIN: SIGNIFICANT CHANGE UP
-  MEROPENEM: SIGNIFICANT CHANGE UP
-  NITROFURANTOIN: SIGNIFICANT CHANGE UP
-  PIPERACILLIN/TAZOBACTAM: SIGNIFICANT CHANGE UP
-  TIGECYCLINE: SIGNIFICANT CHANGE UP
-  TOBRAMYCIN: SIGNIFICANT CHANGE UP
-  TRIMETHOPRIM/SULFAMETHOXAZOLE: SIGNIFICANT CHANGE UP
BACTERIA UR CULT: SIGNIFICANT CHANGE UP
METHOD TYPE: SIGNIFICANT CHANGE UP
ORGANISM # SPEC MICROSCOPIC CNT: SIGNIFICANT CHANGE UP
ORGANISM # SPEC MICROSCOPIC CNT: SIGNIFICANT CHANGE UP

## 2020-02-27 ENCOUNTER — APPOINTMENT (OUTPATIENT)
Dept: PULMONOLOGY | Facility: CLINIC | Age: 80
End: 2020-02-27
Payer: MEDICARE

## 2020-02-27 VITALS
TEMPERATURE: 98.3 F | HEART RATE: 98 BPM | OXYGEN SATURATION: 90 % | RESPIRATION RATE: 16 BRPM | DIASTOLIC BLOOD PRESSURE: 68 MMHG | SYSTOLIC BLOOD PRESSURE: 128 MMHG

## 2020-02-27 PROCEDURE — 94060 EVALUATION OF WHEEZING: CPT

## 2020-02-27 PROCEDURE — 99214 OFFICE O/P EST MOD 30 MIN: CPT | Mod: 25

## 2020-02-27 NOTE — PHYSICAL EXAM
[No Acute Distress] : no acute distress [Normal Oropharynx] : normal oropharynx [Normal Appearance] : normal appearance [No Neck Mass] : no neck mass [Normal Rate/Rhythm] : normal rate/rhythm [Normal S1, S2] : normal s1, s2 [No Murmurs] : no murmurs [No Abnormalities] : no abnormalities [Benign] : benign [Normal Gait] : normal gait [No Clubbing] : no clubbing [No Cyanosis] : no cyanosis [No Edema] : no edema [FROM] : FROM [Normal Color/ Pigmentation] : normal color/ pigmentation [No Focal Deficits] : no focal deficits [Oriented x3] : oriented x3 [Normal Affect] : normal affect [TextBox_68] : Diminished breath sounds bilaterally

## 2020-02-27 NOTE — HISTORY OF PRESENT ILLNESS
[Former] : former [Never] : never [TextBox_4] : Followup for COPD\par Chronic nausea\par Unable to use inhalers on any regular basis\par Not using oxygen as she finds it makes her nauseous\par Everything seems to make her nauseous\par She had an ER evaluation imaging and labs all of which were negative\par She notes severe dyspnea

## 2020-02-27 NOTE — ASSESSMENT
[FreeTextEntry1] : Recommend trial of Marinol for nausea\par Recommend that patient's oxygen concentrator be checked by respiratory therapist-will contact vendor\par Patient instructed in proper use of respimat inhaler\par Start Trish

## 2020-05-11 ENCOUNTER — APPOINTMENT (OUTPATIENT)
Dept: RADIOLOGY | Facility: IMAGING CENTER | Age: 80
End: 2020-05-11
Payer: MEDICARE

## 2020-05-11 ENCOUNTER — OUTPATIENT (OUTPATIENT)
Dept: OUTPATIENT SERVICES | Facility: HOSPITAL | Age: 80
LOS: 1 days | End: 2020-05-11
Payer: MEDICARE

## 2020-05-11 DIAGNOSIS — Z98.890 OTHER SPECIFIED POSTPROCEDURAL STATES: Chronic | ICD-10-CM

## 2020-05-11 DIAGNOSIS — Z00.8 ENCOUNTER FOR OTHER GENERAL EXAMINATION: ICD-10-CM

## 2020-05-11 PROCEDURE — 71046 X-RAY EXAM CHEST 2 VIEWS: CPT | Mod: 26

## 2020-05-11 PROCEDURE — 71046 X-RAY EXAM CHEST 2 VIEWS: CPT

## 2020-05-15 ENCOUNTER — INPATIENT (INPATIENT)
Facility: HOSPITAL | Age: 80
LOS: 4 days | Discharge: HOME | DRG: 690 | End: 2020-05-20
Attending: INTERNAL MEDICINE | Admitting: INTERNAL MEDICINE
Payer: MEDICARE

## 2020-05-15 VITALS
TEMPERATURE: 97 F | OXYGEN SATURATION: 100 % | RESPIRATION RATE: 20 BRPM | HEART RATE: 100 BPM | SYSTOLIC BLOOD PRESSURE: 136 MMHG | DIASTOLIC BLOOD PRESSURE: 63 MMHG

## 2020-05-15 DIAGNOSIS — Z98.890 OTHER SPECIFIED POSTPROCEDURAL STATES: Chronic | ICD-10-CM

## 2020-05-15 PROCEDURE — 93010 ELECTROCARDIOGRAM REPORT: CPT

## 2020-05-15 PROCEDURE — 99285 EMERGENCY DEPT VISIT HI MDM: CPT

## 2020-05-16 DIAGNOSIS — J44.1 CHRONIC OBSTRUCTIVE PULMONARY DISEASE WITH (ACUTE) EXACERBATION: ICD-10-CM

## 2020-05-16 DIAGNOSIS — I48.91 UNSPECIFIED ATRIAL FIBRILLATION: ICD-10-CM

## 2020-05-16 DIAGNOSIS — R79.89 OTHER SPECIFIED ABNORMAL FINDINGS OF BLOOD CHEMISTRY: ICD-10-CM

## 2020-05-16 DIAGNOSIS — N18.3 CHRONIC KIDNEY DISEASE, STAGE 3 (MODERATE): ICD-10-CM

## 2020-05-16 DIAGNOSIS — Z29.9 ENCOUNTER FOR PROPHYLACTIC MEASURES, UNSPECIFIED: ICD-10-CM

## 2020-05-16 DIAGNOSIS — R06.02 SHORTNESS OF BREATH: ICD-10-CM

## 2020-05-16 DIAGNOSIS — I10 ESSENTIAL (PRIMARY) HYPERTENSION: ICD-10-CM

## 2020-05-16 DIAGNOSIS — Z79.899 OTHER LONG TERM (CURRENT) DRUG THERAPY: ICD-10-CM

## 2020-05-16 LAB
ALBUMIN SERPL ELPH-MCNC: 3.9 G/DL — SIGNIFICANT CHANGE UP (ref 3.3–5)
ALP SERPL-CCNC: 80 U/L — SIGNIFICANT CHANGE UP (ref 40–120)
ALT FLD-CCNC: 9 U/L — LOW (ref 10–45)
ANION GAP SERPL CALC-SCNC: 14 MMOL/L — SIGNIFICANT CHANGE UP (ref 5–17)
ANION GAP SERPL CALC-SCNC: 15 MMOL/L — SIGNIFICANT CHANGE UP (ref 5–17)
APPEARANCE UR: CLEAR — SIGNIFICANT CHANGE UP
APTT BLD: 25 SEC — LOW (ref 27.5–36.3)
AST SERPL-CCNC: 20 U/L — SIGNIFICANT CHANGE UP (ref 10–40)
BACTERIA # UR AUTO: ABNORMAL
BASOPHILS # BLD AUTO: 0.03 K/UL — SIGNIFICANT CHANGE UP (ref 0–0.2)
BASOPHILS # BLD AUTO: 0.04 K/UL — SIGNIFICANT CHANGE UP (ref 0–0.2)
BASOPHILS NFR BLD AUTO: 0.4 % — SIGNIFICANT CHANGE UP (ref 0–2)
BASOPHILS NFR BLD AUTO: 0.5 % — SIGNIFICANT CHANGE UP (ref 0–2)
BILIRUB SERPL-MCNC: 0.8 MG/DL — SIGNIFICANT CHANGE UP (ref 0.2–1.2)
BILIRUB UR-MCNC: NEGATIVE — SIGNIFICANT CHANGE UP
BUN SERPL-MCNC: 15 MG/DL — SIGNIFICANT CHANGE UP (ref 7–23)
BUN SERPL-MCNC: 16 MG/DL — SIGNIFICANT CHANGE UP (ref 7–23)
CALCIUM SERPL-MCNC: 9.2 MG/DL — SIGNIFICANT CHANGE UP (ref 8.4–10.5)
CALCIUM SERPL-MCNC: 9.8 MG/DL — SIGNIFICANT CHANGE UP (ref 8.4–10.5)
CHLORIDE SERPL-SCNC: 100 MMOL/L — SIGNIFICANT CHANGE UP (ref 96–108)
CHLORIDE SERPL-SCNC: 101 MMOL/L — SIGNIFICANT CHANGE UP (ref 96–108)
CK MB BLD-MCNC: 2.2 % — SIGNIFICANT CHANGE UP (ref 0–3.5)
CK MB CFR SERPL CALC: 7.2 NG/ML — HIGH (ref 0–3.8)
CK SERPL-CCNC: 331 U/L — HIGH (ref 25–170)
CO2 SERPL-SCNC: 27 MMOL/L — SIGNIFICANT CHANGE UP (ref 22–31)
CO2 SERPL-SCNC: 29 MMOL/L — SIGNIFICANT CHANGE UP (ref 22–31)
COLOR SPEC: SIGNIFICANT CHANGE UP
CREAT SERPL-MCNC: 1.25 MG/DL — SIGNIFICANT CHANGE UP (ref 0.5–1.3)
CREAT SERPL-MCNC: 1.33 MG/DL — HIGH (ref 0.5–1.3)
DIFF PNL FLD: ABNORMAL
EOSINOPHIL # BLD AUTO: 0.5 K/UL — SIGNIFICANT CHANGE UP (ref 0–0.5)
EOSINOPHIL # BLD AUTO: 0.51 K/UL — HIGH (ref 0–0.5)
EOSINOPHIL NFR BLD AUTO: 5.8 % — SIGNIFICANT CHANGE UP (ref 0–6)
EOSINOPHIL NFR BLD AUTO: 6.9 % — HIGH (ref 0–6)
EPI CELLS # UR: 1 /HPF — SIGNIFICANT CHANGE UP
FOLATE SERPL-MCNC: 12.7 NG/ML — SIGNIFICANT CHANGE UP
GAS PNL BLDV: SIGNIFICANT CHANGE UP
GLUCOSE SERPL-MCNC: 100 MG/DL — HIGH (ref 70–99)
GLUCOSE SERPL-MCNC: 118 MG/DL — HIGH (ref 70–99)
GLUCOSE UR QL: NEGATIVE — SIGNIFICANT CHANGE UP
HCT VFR BLD CALC: 31.4 % — LOW (ref 34.5–45)
HCT VFR BLD CALC: 32.3 % — LOW (ref 34.5–45)
HGB BLD-MCNC: 9.4 G/DL — LOW (ref 11.5–15.5)
HGB BLD-MCNC: 9.4 G/DL — LOW (ref 11.5–15.5)
HYALINE CASTS # UR AUTO: 2 /LPF — SIGNIFICANT CHANGE UP (ref 0–2)
IMM GRANULOCYTES NFR BLD AUTO: 0.6 % — SIGNIFICANT CHANGE UP (ref 0–1.5)
IMM GRANULOCYTES NFR BLD AUTO: 0.6 % — SIGNIFICANT CHANGE UP (ref 0–1.5)
INR BLD: 1.1 RATIO — SIGNIFICANT CHANGE UP (ref 0.88–1.16)
IRON SATN MFR SERPL: 30 UG/DL — SIGNIFICANT CHANGE UP (ref 30–160)
IRON SATN MFR SERPL: 8 % — LOW (ref 14–50)
KETONES UR-MCNC: NEGATIVE — SIGNIFICANT CHANGE UP
LEUKOCYTE ESTERASE UR-ACNC: ABNORMAL
LYMPHOCYTES # BLD AUTO: 1.66 K/UL — SIGNIFICANT CHANGE UP (ref 1–3.3)
LYMPHOCYTES # BLD AUTO: 2.34 K/UL — SIGNIFICANT CHANGE UP (ref 1–3.3)
LYMPHOCYTES # BLD AUTO: 22.9 % — SIGNIFICANT CHANGE UP (ref 13–44)
LYMPHOCYTES # BLD AUTO: 26.8 % — SIGNIFICANT CHANGE UP (ref 13–44)
MAGNESIUM SERPL-MCNC: 2 MG/DL — SIGNIFICANT CHANGE UP (ref 1.6–2.6)
MCHC RBC-ENTMCNC: 24.1 PG — LOW (ref 27–34)
MCHC RBC-ENTMCNC: 24.5 PG — LOW (ref 27–34)
MCHC RBC-ENTMCNC: 29.1 GM/DL — LOW (ref 32–36)
MCHC RBC-ENTMCNC: 29.9 GM/DL — LOW (ref 32–36)
MCV RBC AUTO: 81.8 FL — SIGNIFICANT CHANGE UP (ref 80–100)
MCV RBC AUTO: 82.8 FL — SIGNIFICANT CHANGE UP (ref 80–100)
MONOCYTES # BLD AUTO: 0.54 K/UL — SIGNIFICANT CHANGE UP (ref 0–0.9)
MONOCYTES # BLD AUTO: 0.7 K/UL — SIGNIFICANT CHANGE UP (ref 0–0.9)
MONOCYTES NFR BLD AUTO: 7.4 % — SIGNIFICANT CHANGE UP (ref 2–14)
MONOCYTES NFR BLD AUTO: 8 % — SIGNIFICANT CHANGE UP (ref 2–14)
NEUTROPHILS # BLD AUTO: 4.49 K/UL — SIGNIFICANT CHANGE UP (ref 1.8–7.4)
NEUTROPHILS # BLD AUTO: 5.1 K/UL — SIGNIFICANT CHANGE UP (ref 1.8–7.4)
NEUTROPHILS NFR BLD AUTO: 58.3 % — SIGNIFICANT CHANGE UP (ref 43–77)
NEUTROPHILS NFR BLD AUTO: 61.8 % — SIGNIFICANT CHANGE UP (ref 43–77)
NITRITE UR-MCNC: POSITIVE
NRBC # BLD: 0 /100 WBCS — SIGNIFICANT CHANGE UP (ref 0–0)
NRBC # BLD: 0 /100 WBCS — SIGNIFICANT CHANGE UP (ref 0–0)
NT-PROBNP SERPL-SCNC: 291 PG/ML — SIGNIFICANT CHANGE UP (ref 0–300)
OB PNL STL: NEGATIVE — SIGNIFICANT CHANGE UP
PH UR: 6 — SIGNIFICANT CHANGE UP (ref 5–8)
PHOSPHATE SERPL-MCNC: 4.2 MG/DL — SIGNIFICANT CHANGE UP (ref 2.5–4.5)
PLATELET # BLD AUTO: 384 K/UL — SIGNIFICANT CHANGE UP (ref 150–400)
PLATELET # BLD AUTO: 443 K/UL — HIGH (ref 150–400)
POTASSIUM SERPL-MCNC: 4 MMOL/L — SIGNIFICANT CHANGE UP (ref 3.5–5.3)
POTASSIUM SERPL-MCNC: 4.4 MMOL/L — SIGNIFICANT CHANGE UP (ref 3.5–5.3)
POTASSIUM SERPL-SCNC: 4 MMOL/L — SIGNIFICANT CHANGE UP (ref 3.5–5.3)
POTASSIUM SERPL-SCNC: 4.4 MMOL/L — SIGNIFICANT CHANGE UP (ref 3.5–5.3)
PROT SERPL-MCNC: 7.3 G/DL — SIGNIFICANT CHANGE UP (ref 6–8.3)
PROT UR-MCNC: ABNORMAL
PROTHROM AB SERPL-ACNC: 12.6 SEC — SIGNIFICANT CHANGE UP (ref 10–12.9)
RBC # BLD: 3.84 M/UL — SIGNIFICANT CHANGE UP (ref 3.8–5.2)
RBC # BLD: 3.9 M/UL — SIGNIFICANT CHANGE UP (ref 3.8–5.2)
RBC # FLD: 14.9 % — HIGH (ref 10.3–14.5)
RBC # FLD: 15.3 % — HIGH (ref 10.3–14.5)
RBC CASTS # UR COMP ASSIST: 5 /HPF — HIGH (ref 0–4)
SARS-COV-2 RNA SPEC QL NAA+PROBE: SIGNIFICANT CHANGE UP
SODIUM SERPL-SCNC: 143 MMOL/L — SIGNIFICANT CHANGE UP (ref 135–145)
SODIUM SERPL-SCNC: 143 MMOL/L — SIGNIFICANT CHANGE UP (ref 135–145)
SP GR SPEC: >1.05 (ref 1.01–1.02)
TIBC SERPL-MCNC: 385 UG/DL — SIGNIFICANT CHANGE UP (ref 220–430)
TROPONIN T, HIGH SENSITIVITY RESULT: 59 NG/L — HIGH (ref 0–51)
TROPONIN T, HIGH SENSITIVITY RESULT: 84 NG/L — HIGH (ref 0–51)
TROPONIN T, HIGH SENSITIVITY RESULT: 86 NG/L — HIGH (ref 0–51)
UIBC SERPL-MCNC: 355 UG/DL — SIGNIFICANT CHANGE UP (ref 110–370)
UROBILINOGEN FLD QL: NEGATIVE — SIGNIFICANT CHANGE UP
VIT B12 SERPL-MCNC: 372 PG/ML — SIGNIFICANT CHANGE UP (ref 232–1245)
WBC # BLD: 7.26 K/UL — SIGNIFICANT CHANGE UP (ref 3.8–10.5)
WBC # BLD: 8.74 K/UL — SIGNIFICANT CHANGE UP (ref 3.8–10.5)
WBC # FLD AUTO: 7.26 K/UL — SIGNIFICANT CHANGE UP (ref 3.8–10.5)
WBC # FLD AUTO: 8.74 K/UL — SIGNIFICANT CHANGE UP (ref 3.8–10.5)
WBC UR QL: 178 /HPF — HIGH (ref 0–5)

## 2020-05-16 PROCEDURE — 99232 SBSQ HOSP IP/OBS MODERATE 35: CPT

## 2020-05-16 PROCEDURE — 71275 CT ANGIOGRAPHY CHEST: CPT | Mod: 26

## 2020-05-16 PROCEDURE — 99223 1ST HOSP IP/OBS HIGH 75: CPT

## 2020-05-16 PROCEDURE — 71045 X-RAY EXAM CHEST 1 VIEW: CPT | Mod: 26

## 2020-05-16 RX ORDER — ASPIRIN/CALCIUM CARB/MAGNESIUM 324 MG
81 TABLET ORAL DAILY
Refills: 0 | Status: DISCONTINUED | OUTPATIENT
Start: 2020-05-16 | End: 2020-05-20

## 2020-05-16 RX ORDER — IPRATROPIUM/ALBUTEROL SULFATE 18-103MCG
3 AEROSOL WITH ADAPTER (GRAM) INHALATION EVERY 6 HOURS
Refills: 0 | Status: DISCONTINUED | OUTPATIENT
Start: 2020-05-16 | End: 2020-05-20

## 2020-05-16 RX ORDER — FLUTICASONE PROPIONATE AND SALMETEROL 50; 250 UG/1; UG/1
2 POWDER ORAL; RESPIRATORY (INHALATION)
Qty: 0 | Refills: 0 | DISCHARGE

## 2020-05-16 RX ORDER — CEFTRIAXONE 500 MG/1
1000 INJECTION, POWDER, FOR SOLUTION INTRAMUSCULAR; INTRAVENOUS EVERY 24 HOURS
Refills: 0 | Status: DISCONTINUED | OUTPATIENT
Start: 2020-05-16 | End: 2020-05-20

## 2020-05-16 RX ORDER — AMLODIPINE BESYLATE 2.5 MG/1
1 TABLET ORAL
Qty: 30 | Refills: 0 | DISCHARGE

## 2020-05-16 RX ORDER — PANTOPRAZOLE SODIUM 20 MG/1
1 TABLET, DELAYED RELEASE ORAL
Qty: 0 | Refills: 0 | DISCHARGE

## 2020-05-16 RX ORDER — ATORVASTATIN CALCIUM 80 MG/1
10 TABLET, FILM COATED ORAL AT BEDTIME
Refills: 0 | Status: DISCONTINUED | OUTPATIENT
Start: 2020-05-16 | End: 2020-05-20

## 2020-05-16 RX ORDER — ONDANSETRON 8 MG/1
4 TABLET, FILM COATED ORAL ONCE
Refills: 0 | Status: COMPLETED | OUTPATIENT
Start: 2020-05-16 | End: 2020-05-16

## 2020-05-16 RX ORDER — ASPIRIN/CALCIUM CARB/MAGNESIUM 324 MG
325 TABLET ORAL ONCE
Refills: 0 | Status: COMPLETED | OUTPATIENT
Start: 2020-05-16 | End: 2020-05-16

## 2020-05-16 RX ORDER — ONDANSETRON 8 MG/1
1 TABLET, FILM COATED ORAL
Qty: 0 | Refills: 0 | DISCHARGE

## 2020-05-16 RX ORDER — AMLODIPINE BESYLATE 2.5 MG/1
2.5 TABLET ORAL DAILY
Refills: 0 | Status: DISCONTINUED | OUTPATIENT
Start: 2020-05-16 | End: 2020-05-20

## 2020-05-16 RX ORDER — TIOTROPIUM BROMIDE 18 UG/1
1 CAPSULE ORAL; RESPIRATORY (INHALATION) DAILY
Refills: 0 | Status: DISCONTINUED | OUTPATIENT
Start: 2020-05-16 | End: 2020-05-20

## 2020-05-16 RX ORDER — APIXABAN 2.5 MG/1
2.5 TABLET, FILM COATED ORAL EVERY 12 HOURS
Refills: 0 | Status: DISCONTINUED | OUTPATIENT
Start: 2020-05-16 | End: 2020-05-20

## 2020-05-16 RX ADMIN — APIXABAN 2.5 MILLIGRAM(S): 2.5 TABLET, FILM COATED ORAL at 09:31

## 2020-05-16 RX ADMIN — ONDANSETRON 4 MILLIGRAM(S): 8 TABLET, FILM COATED ORAL at 22:07

## 2020-05-16 RX ADMIN — ATORVASTATIN CALCIUM 10 MILLIGRAM(S): 80 TABLET, FILM COATED ORAL at 21:49

## 2020-05-16 RX ADMIN — AMLODIPINE BESYLATE 2.5 MILLIGRAM(S): 2.5 TABLET ORAL at 09:31

## 2020-05-16 RX ADMIN — Medication 3 MILLILITER(S): at 12:50

## 2020-05-16 RX ADMIN — CEFTRIAXONE 100 MILLIGRAM(S): 500 INJECTION, POWDER, FOR SOLUTION INTRAMUSCULAR; INTRAVENOUS at 21:49

## 2020-05-16 RX ADMIN — Medication 81 MILLIGRAM(S): at 12:50

## 2020-05-16 RX ADMIN — Medication 325 MILLIGRAM(S): at 04:16

## 2020-05-16 RX ADMIN — TIOTROPIUM BROMIDE 1 CAPSULE(S): 18 CAPSULE ORAL; RESPIRATORY (INHALATION) at 18:24

## 2020-05-16 RX ADMIN — APIXABAN 2.5 MILLIGRAM(S): 2.5 TABLET, FILM COATED ORAL at 21:49

## 2020-05-16 RX ADMIN — Medication 3 MILLILITER(S): at 18:24

## 2020-05-16 NOTE — ED PROVIDER NOTE - OBJECTIVE STATEMENT
79F, h.o copd (2L O2), HLD, HTN, CVA, p.w shortness of breath and right eye vision loss that resolved after robbing/cleaning her eye by EMS. Pt states LE edema for 5 days and denied any chest pain, n/v, loc, headache. patient was given albuterol by ems. 79F, h.o copd (2L O2), HLD, HTN, CVA, p.w shortness of breath and right eye vision loss that resolved after robbing/cleaning her eye by EMS. Pt states LE edema for 5 days and denied any chest pain, n/v, loc, headache. patient was given albuterol by ems.   h.o obtained from the daughter and the patient. 79F, h.o copd (2L O2), HLD, HTN, CVA, afib (Eliquis) p.w shortness of breath and right eye vision loss that resolved after robbing/cleaning her eye by EMS. Pt states LE edema for 5 days and denied any chest pain, n/v, loc, headache. patient was given albuterol by ems.   h.o obtained from the daughter and the patient.

## 2020-05-16 NOTE — H&P ADULT - PROBLEM SELECTOR PLAN 1
Differential includes but not limited to COPD exacerbation vs PE vs CHF exacerbation. CHF exacerbation seems less like as BNP lower however patient with LE edema.   CTA Chest ordered by ED team and performed: awaiting read  No signs of PNA on CXR, COVID-19 PCR reported as nondetected.  May be residual hypoxia from COPD  Confirm pulmonary regimen with patient's pharmacy/PMD/daughter  Monitor Pulse Ox

## 2020-05-16 NOTE — H&P ADULT - NSICDXPASTMEDICALHX_GEN_ALL_CORE_FT
PAST MEDICAL HISTORY:  Cerebrovascular accident (CVA)     COPD (chronic obstructive pulmonary disease)     Dizziness     Former smoker     Gallstones     Hyperlipidemia     Hypertension     Stenosis of carotid artery may 2017

## 2020-05-16 NOTE — H&P ADULT - NSHPLABSRESULTS_GEN_ALL_CORE
Personally reviewed labs and noted in detail below: normocytic anemia. Elevated HsTrop of 84 and 86      Personally reviewed EKG: Sinus Tach TN 96 bpm QTc 459 no ST segmetn changes    < from: Xray Chest 1 View- PORTABLE-Urgent (05.16.20 @ 00:07) >  ******PRELIMINARY REPORT******        INTERPRETATION:  no emergent findings  ******PRELIMINARY REPORT******    < end of copied text >    CTA chest ordered by ED team and pending results

## 2020-05-16 NOTE — H&P ADULT - NSHPSOCIALHISTORY_GEN_ALL_CORE
-  passed ~1 year ago  Former smoker quit ~10 years ago  Denies EtOH use -  passed ~2 years ago  Former smoker quit ~10 years ago  Denies EtOH use

## 2020-05-16 NOTE — ED PROVIDER NOTE - PHYSICAL EXAMINATION
General: NAD, good hygiene, well developed  HENT: Atraumatic, EOMI, no conjunctivae injection, moist mucosa   Neck: normal ROM and trachea midline   Cardiovascular: tachycardiac, S1&2, no M or R, radial pulses equal and b/l  Respiratory: CTABL, b/l crackles, decreased airflow  Abdominal: soft and non-tender non distended, neg for guarding, bradshaw's sign, rovsing's sign, mcburney's sign, no CVA tenderness   Extremities: +2 edema of the legs/feet, DP/PT equal b/l  Skin: warm, well perfused  Neurologic: nonfocal, AAOx3  Psych: normal mood and affect

## 2020-05-16 NOTE — ED PROVIDER NOTE - CLINICAL SUMMARY MEDICAL DECISION MAKING FREE TEXT BOX
79F, h.o copd on home ox, htn, hld, p.w acute shortness of breath and weakness with transient right eye vision loss resolved after cleaning and rubbing her eye. No facial drooping, weakness, headache, n/v. low suspicion for cva event base on hx and neg neuro. LE edema concerning for CHF, will get bnp, b/l LE edema w.o pain unlikely dvt at this time. patient is taking xarelto for afib, low suspicion for PE. tachycardiac possible from albuterol. will get acs workup and cxr, ekg. soft abdomen, no guarding or distension, unlikely surgical abdomen at this time. if neg workup, will d/c home. 79F, h.o copd on home ox, htn, hld, p.w acute shortness of breath and weakness with transient right eye vision loss resolved after cleaning and rubbing her eye. No facial drooping, weakness, headache, n/v. low suspicion for cva event base on hx and neg neuro. LE edema concerning for CHF, will get bnp, b/l LE edema w.o pain unlikely dvt at this time. patient is taking xarelto for afib, low suspicion for PE. tachycardiac possible from albuterol. will get acs workup and cxr, ekg. no sign of infection at this time but will eval w. cxr for pna, soft abdomen, no guarding or distension, unlikely surgical abdomen at this time. if neg workup, will d/c home. 79F, h.o copd on home ox, htn, hld, p.w acute shortness of breath and weakness with transient right eye vision loss resolved after cleaning and rubbing her eye. No facial drooping, weakness, headache, n/v. low suspicion for cva event base on hx and neg neuro. LE edema concerning for CHF, will get bnp, b/l LE edema w.o pain unlikely dvt at this time. patient is taking xarelto for afib, low suspicion for PE. tachycardiac possible from albuterol. will get acs workup and cxr, ekg. no sign of infection at this time but will eval w. cxr for pna, soft abdomen, no guarding or distension, unlikely surgical abdomen at this time. if neg workup, will d/c home.    MADYSON Ken MD: Pt is an 78 y/o female with PMH COPD (on 2L home O2 at home PRN), HLD, HTN, CVA, who is BIBA for c/o shortness of breath. Per pt and family, she was experiencing some SOB tonight that was worse than  Pt states LE edema for 5 days and denied any chest pain, n/v, loc, headache. patient was given albuterol by ems. 79F, h.o copd on home ox, htn, hld, p.w acute shortness of breath and weakness with transient right eye vision loss resolved after cleaning and rubbing her eye. No facial drooping, weakness, headache, n/v. low suspicion for cva event base on hx and neg neuro. LE edema concerning for CHF, will get bnp, b/l LE edema w.o pain unlikely dvt at this time. patient is taking xarelto for afib, low suspicion for PE. tachycardiac possible from albuterol. will get acs workup and cxr, ekg. no sign of infection at this time but will eval w. cxr for pna, soft abdomen, no guarding or distension, unlikely surgical abdomen at this time. if neg workup, will d/c home.    MADYSON Ken MD: Pt is an 78 y/o female with PMH COPD (on 2L home O2 at home PRN), HLD, HTN, CVA, who is BIBA for c/o shortness of breath. Per pt and family, she was experiencing some SOB tonight that was worse than usual. She then complained of having difficulty seeing out of L eye which resolved after rubbing the eye. Pt notes she has LE edema for 5 days and denied any chest pain, n/v, loc, headache. patient was given albuterol by ems. Pt denies: SOB, cough, fevers, chills, pleuritic chest pain, abdominal pain, n/v/d, back pain, neck pain, HA, neck stiffness, focal numbness or weakness, current visual changes, dizziness, lightheadedness, leg pain/swelling, recent travel, recent trauma, recent immobilization, dysuria, hematuria, rash. Exam: A & O x 3, NAD, HEENT WNL and no facial asymmetry; lungs fine crackles at bases b/l, cor +S1/S2, RRR, no murmurs; abdomen soft NTND; lower extremities with 2+ edema; skin with no rashes, neuro exam non focal with no motor or sensory deficits. Ddx includes, however, is not limited to: CHF exacerbation, COPD exacerbation, ACS, infectious process, other. Plan: basic labs, trop, bnp, cxr, 12 lead, reassess

## 2020-05-16 NOTE — CONSULT NOTE ADULT - SUBJECTIVE AND OBJECTIVE BOX
PULMONARY CONSULT NOTE      SULMA MEI  MRN-[id number]    Patient is a 79y old  Female who presents with a chief complaint of shortness of breath (16 May 2020 03:58)      HISTORY OF PRESENT ILLNESS:  78 yo woman with PMH of HTN, HLD, Afib on Eliquis, COPD on 2L of O2 at home (not compliant with continuous use), CVA (no reported deficits) hx of carotid stenosis s/p Right CEA, thoracic, abdominal aortic aneurysm admitted for sudden onset dyspnea. no fevers, chills, sick contact. no COVID exposure.  Denies rhinorrhea, nasal congestion, sore throat    Last seen Dr Cristobal 2/2020, per his notes- unable to tolerate most medications due to nausea.      Allergies    No Known Allergies    Intolerances        PAST MEDICAL & SURGICAL HISTORY:  Hypertension  Cerebrovascular accident (CVA)  Former smoker  Gallstones  Hyperlipidemia  Dizziness  Stenosis of carotid artery: may 2017  COPD (chronic obstructive pulmonary disease)  H/O carotid endarterectomy          FAMILY HISTORY:  No pertinent family history in first degree relatives    Prescriptions:    Ecotrin: Last Dose Taken:  , 81 milligram(s) orally once a day  · 	atorvastatin 10 mg oral tablet: Last Dose Taken:  , 1 tab(s) orally once a day  · 	albuterol 2.5 mg/3 mL (0.083%) inhalation solution: Last Dose Taken:  , 3 milliliter(s) by nebulizer every 8 hours, As Needed  · 	amLODIPine 2.5 mg oral tablet: Last Dose Taken:  , 1 tab(s) orally once a day  · 	apixaban 2.5 mg oral tablet: Last Dose Taken:  , 1 tab(s) orally 2 times a day    Aspirin 81 MG Oral Tablet Delayed Release; TAKE 1 TABLET BY MOUTH EVERY DAY  Atorvastatin Calcium 10 MG Oral Tablet; TAKE 1 TABLET AT BEDTIME  Pantoprazole Sodium 40 MG Oral Tablet Delayed Release; TAKE 1 TABLET BY MOUTH  EVERY DAY  Stiolto Respimat 2.5-2.5 MCG/ACT Inhalation Aerosol Solution; INHALE 2 PUFFS Daily  Vitamin D TABS  Zofran 4 MG Oral Tablet; 1 tab every 6 hours as needed    SOCIAL HISTORY  Smoking History:     REVIEW OF SYSTEMS:    CONSTITUTIONAL:  No fevers, chills, sweats    HEENT:  Eyes:  No diplopia or blurred vision. ENT:  No earache, sore throat or runny nose.    CARDIOVASCULAR:  No pressure, squeezing, tightness, or heaviness about the chest; no palpitations.    RESPIRATORY:  Per HPI    GASTROINTESTINAL:  No abdominal pain, nausea, vomiting or diarrhea.    GENITOURINARY:  No dysuria, frequency or urgency.    NEUROLOGIC:  No paresthesias, fasciculations, seizures or weakness.    PSYCHIATRIC:  No disorder of thought or mood.    Vital Signs Last 24 Hrs  T(C): 36.4 (16 May 2020 08:48), Max: 37 (16 May 2020 04:47)  T(F): 97.6 (16 May 2020 08:48), Max: 98.6 (16 May 2020 04:47)  HR: 99 (16 May 2020 08:48) (97 - 103)  BP: 122/72 (16 May 2020 09:32) (117/61 - 154/77)  BP(mean): --  RR: 18 (16 May 2020 08:48) (18 - 20)  SpO2: 99% (16 May 2020 08:48) (99% - 100%)    PHYSICAL EXAMINATION:    GENERAL: The patient is a well-developed, well-nourished _____in no apparent distress.     HEENT: Head is normocephalic and atraumatic. Extraocular muscles are intact. Mucous membranes are moist.     NECK: Supple.     LUNGS: Clear to auscultation without wheezing, rales, or rhonchi. Respirations unlabored    HEART: Regular rate and rhythm without murmur.    ABDOMEN: Soft, nontender, and nondistended.  No hepatosplenomegaly is noted.    EXTREMITIES: Without any cyanosis, clubbing, rash, lesions or edema.    NEUROLOGIC: Grossly intact      MEDICATIONS  (STANDING):  albuterol/ipratropium for Nebulization 3 milliLiter(s) Nebulizer every 6 hours  amLODIPine   Tablet 2.5 milliGRAM(s) Oral daily  apixaban 2.5 milliGRAM(s) Oral every 12 hours  aspirin enteric coated 81 milliGRAM(s) Oral daily  atorvastatin 10 milliGRAM(s) Oral at bedtime      MEDICATIONS  (PRN):        LABS:   CBC Full  -  ( 16 May 2020 00:27 )  WBC Count : 8.74 K/uL  RBC Count : 3.84 M/uL  Hemoglobin : 9.4 g/dL  Hematocrit : 31.4 %  Platelet Count - Automated : 384 K/uL  Mean Cell Volume : 81.8 fl  Mean Cell Hemoglobin : 24.5 pg  Mean Cell Hemoglobin Concentration : 29.9 gm/dL  Auto Neutrophil # : 5.10 K/uL  Auto Lymphocyte # : 2.34 K/uL  Auto Monocyte # : 0.70 K/uL  Auto Eosinophil # : 0.51 K/uL  Auto Basophil # : 0.04 K/uL  Auto Neutrophil % : 58.3 %  Auto Lymphocyte % : 26.8 %  Auto Monocyte % : 8.0 %  Auto Eosinophil % : 5.8 %  Auto Basophil % : 0.5 %    PT/INR - ( 16 May 2020 00:27 )   PT: 12.6 sec;   INR: 1.10 ratio         PTT - ( 16 May 2020 00:27 )  PTT:25.0 sec  05-16    143  |  101  |  16  ----------------------------<  118<H>  4.4   |  27  |  1.33<H>    Ca    9.8      16 May 2020 00:27    TPro  7.3  /  Alb  3.9  /  TBili  0.8  /  DBili  x   /  AST  20  /  ALT  9<L>  /  AlkPhos  80  05-16     COVID-19 PCR . (05.16.20 @ 01:32)    COVID-19 PCR: NotDetec: This test has been validated by [employer] to be accurate;  though it has not been FDA cleared/approved by the usual pathway.  As with all laboratory tests, results should be correlated with clinical  findings.  https://www.fda.gov/media/224678/download  https://www.fda.gov/media/260727/download        RADIOLOGY & ADDITIONAL STUDIES:  < from: CT Angio Chest w/ IV Cont (05.16.20 @ 03:58) >    EXAM:  CT ANGIO CHEST (W)AW IC                            PROCEDURE DATE:  05/16/2020            INTERPRETATION:  CLINICAL INFORMATION: Tachycardia with lower extremity edema.    COMPARISON: Chest CT 5/21/2019    PROCEDURE:   CT Angiography of the Chest.  90 ml of Omnipaque 350 was injected intravenously. 10 ml were discarded.  Sagittal and coronal reformats were performed as well as 3D (MIP) reconstructions.      FINDINGS:    LUNGS AND AIRWAYS: Patent central airways.  Biapical scarring. Emphysema.     PLEURA: No pleural effusion.    MEDIASTINUM AND TARA: No lymphadenopathy.    VESSELS: No pulmonary emboli. The ascending aorta measures 4.3 cm and descending aorta maximally measuring 3.9 cm unchanged at the same level compared to prior from5/21/2019. Calcified and noncalcified aortic plaque. Ulcerated plaque is identified in the descending thoracic aorta.    HEART: Heart size is normal. No pericardial effusion.    CHEST WALL AND LOWER NECK: Within normal limits.    VISUALIZED UPPER ABDOMEN: Status post cholecystectomy.    BONES: Spondylosis. Multiple chronic right-sided rib fractures.    IMPRESSION:     No pulmonary emboli.                        ASHWINI REYNAGA M.D., RADIOLOGY RESIDENT  This document has been electronically signed.  DOUG GARCIA M.D., ATTENDING RADIOLOGIST  This document has been electronically signed. May 16 2020  8:34AM        < end of copied text >    ECHO:  none  ASSESSMENT:    PLAN:      Thank you for allowing me to participate in the care of this patient.  Please feel free to call me for any questions/concerns.      Cassidy Flores DO  [facility] Pulmonary/Sleep Medicine  [phone] PULMONARY CONSULT NOTE      SULMA MEI  MRN-[id number]    Patient is a 79y old  Female who presents with a chief complaint of shortness of breath (16 May 2020 03:58)      HISTORY OF PRESENT ILLNESS:  78 yo woman with PMH of HTN, HLD, Afib on Eliquis, COPD on 2L of O2 at home (not compliant with continuous use), CVA (no reported deficits) hx of carotid stenosis s/p Right CEA, thoracic, abdominal aortic aneurysm admitted for sudden onset dyspnea. no fevers, chills, sick contact. no COVID exposure.  Denies rhinorrhea, nasal congestion, sore throat  states her dyspnea started 1-2 hours prior to calling EMS.  she ha s had some LE swelling b/l for a few days prior to coming into the hospital  she is on 02- uses it PRN- feels excessively sleepy when she tanner wear it.   Last seen Dr Cristobal 2/2020, per his notes- unable to tolerate most medications due to nausea.  has not started the marinol that Dr Cristobal had ordered  does not recall the inhaler she was rx.     today-  she is on 2L is comfortable. no distress. occasional cough.   getting neb treatment      Allergies    No Known Allergies    Intolerances        PAST MEDICAL & SURGICAL HISTORY:  Hypertension  Cerebrovascular accident (CVA)  Former smoker  Gallstones  Hyperlipidemia  Dizziness  Stenosis of carotid artery: may 2017  COPD (chronic obstructive pulmonary disease)  H/O carotid endarterectomy          FAMILY HISTORY:  No pertinent family history in first degree relatives    Prescriptions:    Ecotrin: Last Dose Taken:  , 81 milligram(s) orally once a day  · 	atorvastatin 10 mg oral tablet: Last Dose Taken:  , 1 tab(s) orally once a day  · 	albuterol 2.5 mg/3 mL (0.083%) inhalation solution: Last Dose Taken:  , 3 milliliter(s) by nebulizer every 8 hours, As Needed  · 	amLODIPine 2.5 mg oral tablet: Last Dose Taken:  , 1 tab(s) orally once a day  · 	apixaban 2.5 mg oral tablet: Last Dose Taken:  , 1 tab(s) orally 2 times a day    Aspirin 81 MG Oral Tablet Delayed Release; TAKE 1 TABLET BY MOUTH EVERY DAY  Atorvastatin Calcium 10 MG Oral Tablet; TAKE 1 TABLET AT BEDTIME  Pantoprazole Sodium 40 MG Oral Tablet Delayed Release; TAKE 1 TABLET BY MOUTH  EVERY DAY  Stiolto Respimat 2.5-2.5 MCG/ACT Inhalation Aerosol Solution; INHALE 2 PUFFS Daily  Vitamin D TABS  Zofran 4 MG Oral Tablet; 1 tab every 6 hours as needed    SOCIAL HISTORY  Smoking History: quit smoking > 15 years ago    REVIEW OF SYSTEMS:    CONSTITUTIONAL:  No fevers, chills, sweats    HEENT:  Eyes:  No diplopia or blurred vision. ENT:  No earache, sore throat or runny nose.    CARDIOVASCULAR:  No pressure, squeezing, tightness, or heaviness about the chest; no palpitations.    RESPIRATORY:  Per HPI    GASTROINTESTINAL:  No abdominal pain, nausea, vomiting or diarrhea.    GENITOURINARY:  No dysuria, frequency or urgency.    NEUROLOGIC:  No paresthesias, fasciculations, seizures or weakness.    PSYCHIATRIC:  No disorder of thought or mood.    Vital Signs Last 24 Hrs  T(C): 36.4 (16 May 2020 08:48), Max: 37 (16 May 2020 04:47)  T(F): 97.6 (16 May 2020 08:48), Max: 98.6 (16 May 2020 04:47)  HR: 99 (16 May 2020 08:48) (97 - 103)  BP: 122/72 (16 May 2020 09:32) (117/61 - 154/77)  BP(mean): --  RR: 18 (16 May 2020 08:48) (18 - 20)  SpO2: 99% (16 May 2020 08:48) (99% - 100%)    PHYSICAL EXAMINATION:    GENERAL: The patient is a well-developed, well-nourished female in no apparent distress.     HEENT: Head is normocephalic and atraumatic. Extraocular muscles are intact. Mucous membranes are moist.     NECK: Supple.     LUNGS: Clear to auscultation without wheezing, rales, or rhonchi. Respirations unlabored    HEART: Regular rate and rhythm without murmur.    ABDOMEN: Soft, nontender, and nondistended.  No hepatosplenomegaly is noted.    EXTREMITIES: Without any cyanosis, clubbing, rash, lesions or edema.    NEUROLOGIC: Grossly intact      MEDICATIONS  (STANDING):  albuterol/ipratropium for Nebulization 3 milliLiter(s) Nebulizer every 6 hours  amLODIPine   Tablet 2.5 milliGRAM(s) Oral daily  apixaban 2.5 milliGRAM(s) Oral every 12 hours  aspirin enteric coated 81 milliGRAM(s) Oral daily  atorvastatin 10 milliGRAM(s) Oral at bedtime      MEDICATIONS  (PRN):        LABS:   CBC Full  -  ( 16 May 2020 00:27 )  WBC Count : 8.74 K/uL  RBC Count : 3.84 M/uL  Hemoglobin : 9.4 g/dL  Hematocrit : 31.4 %  Platelet Count - Automated : 384 K/uL  Mean Cell Volume : 81.8 fl  Mean Cell Hemoglobin : 24.5 pg  Mean Cell Hemoglobin Concentration : 29.9 gm/dL  Auto Neutrophil # : 5.10 K/uL  Auto Lymphocyte # : 2.34 K/uL  Auto Monocyte # : 0.70 K/uL  Auto Eosinophil # : 0.51 K/uL  Auto Basophil # : 0.04 K/uL  Auto Neutrophil % : 58.3 %  Auto Lymphocyte % : 26.8 %  Auto Monocyte % : 8.0 %  Auto Eosinophil % : 5.8 %  Auto Basophil % : 0.5 %    PT/INR - ( 16 May 2020 00:27 )   PT: 12.6 sec;   INR: 1.10 ratio         PTT - ( 16 May 2020 00:27 )  PTT:25.0 sec  05-16    143  |  101  |  16  ----------------------------<  118<H>  4.4   |  27  |  1.33<H>    Ca    9.8      16 May 2020 00:27    TPro  7.3  /  Alb  3.9  /  TBili  0.8  /  DBili  x   /  AST  20  /  ALT  9<L>  /  AlkPhos  80  05-16     COVID-19 PCR . (05.16.20 @ 01:32)    COVID-19 PCR: NotDetec: This test has been validated by [employer] to be accurate;  though it has not been FDA cleared/approved by the usual pathway.  As with all laboratory tests, results should be correlated with clinical  findings.  https://www.fda.gov/media/698224/download  https://www.fda.gov/media/738813/download        RADIOLOGY & ADDITIONAL STUDIES:  < from: CT Angio Chest w/ IV Cont (05.16.20 @ 03:58) >    EXAM:  CT ANGIO CHEST (W)AW IC                            PROCEDURE DATE:  05/16/2020            INTERPRETATION:  CLINICAL INFORMATION: Tachycardia with lower extremity edema.    COMPARISON: Chest CT 5/21/2019    PROCEDURE:   CT Angiography of the Chest.  90 ml of Omnipaque 350 was injected intravenously. 10 ml were discarded.  Sagittal and coronal reformats were performed as well as 3D (MIP) reconstructions.      FINDINGS:    LUNGS AND AIRWAYS: Patent central airways.  Biapical scarring. Emphysema.     PLEURA: No pleural effusion.    MEDIASTINUM AND TARA: No lymphadenopathy.    VESSELS: No pulmonary emboli. The ascending aorta measures 4.3 cm and descending aorta maximally measuring 3.9 cm unchanged at the same level compared to prior from5/21/2019. Calcified and noncalcified aortic plaque. Ulcerated plaque is identified in the descending thoracic aorta.    HEART: Heart size is normal. No pericardial effusion.    CHEST WALL AND LOWER NECK: Within normal limits.    VISUALIZED UPPER ABDOMEN: Status post cholecystectomy.    BONES: Spondylosis. Multiple chronic right-sided rib fractures.    IMPRESSION:     No pulmonary emboli.                        ASHWINI REYNAGA M.D., RADIOLOGY RESIDENT  This document has been electronically signed.  DOUG GARCIA M.D., ATTENDING RADIOLOGIST  This document has been electronically signed. May 16 2020  8:34AM        < end of copied text >    ECHO:  none    ASSESSMENT:  78 yo female with multiple medical problems admitted for dyspnea    PLAN:  dont feel COPD is in exacerbation   continue nebs PRN  titrate down 02 as tolerated- room air pulse ox > 92%  no radiographic or clinical findings suggestive of COVID  f/u TTE  suggest checking US dopplers to r/o DVT  continue NOAC  can try spiriva (ordered)       Thank you for allowing me to participate in the care of this patient.  Please feel free to call me for any questions/concerns.      Cassidy Flores DO  [facility] Pulmonary/Sleep Medicine  [phone]

## 2020-05-16 NOTE — ED ADULT NURSE NOTE - NSIMPLEMENTINTERV_GEN_ALL_ED
Implemented All Fall with Harm Risk Interventions:  [place] to call system. Call bell, personal items and telephone within reach. Instruct patient to call for assistance. Room bathroom lighting operational. Non-slip footwear when patient is off stretcher. Physically safe environment: no spills, clutter or unnecessary equipment. Stretcher in lowest position, wheels locked, appropriate side rails in place. Provide visual cue, wrist band, yellow gown, etc. Monitor gait and stability. Monitor for mental status changes and reorient to person, place, and time. Review medications for side effects contributing to fall risk. Reinforce activity limits and safety measures with patient and family. Provide visual clues: red socks.

## 2020-05-16 NOTE — H&P ADULT - NSHPREVIEWOFSYSTEMS_GEN_ALL_CORE
REVIEW OF SYSTEMS:  CONSTITUTIONAL: No fever, chills or sweats. +Subjective warmth  EENMT:  No current vision changes. No eye discharge. No sinus congestion or throat pain  NECK: No pain or stiffness  RESPIRATORY: See HPI  CARDIOVASCULAR: See HPI  GASTROINTESTINAL: No abdominal pain. +Chronic intermittent nausea. No vomiting. No diarrhea or constipation. No melena or hematochezia.  GENITOURINARY: No dysuria or change of frequency.  NEUROLOGICAL: No headaches, loss of strength, numbness, or tremors  SKIN: No rashes or lesions   LYMPH NODES: No enlarged glands  MUSCULOSKELETAL: No joint pain or swelling; No muscle, back, or extremity pain  PSYCHIATRIC: No depression or anxiety.  HEME/LYMPH: No easy bruising, or bleeding gums.  ALLERGY AND IMMUNOLOGIC: No reaction to medications

## 2020-05-16 NOTE — ED PROVIDER NOTE - NS ED ROS FT
GENERAL: No fever or chills, weight changes, nightsweats  EYES: no change in vision  HEENT: no dysplasia, odynophagia, ear pain, rhinorrhea, epistasis   CARDIAC: no chest pain, palpitation   PULMONARY: HPI   GI: no abdominal pain, no nausea or no vomiting, no diarrhea or constipation  : No changes in urination for pain/freq.   SKIN: no rashes, abnormal bruising or bleeding  NEURO: no headache, numbness/tingling, extremity weakness   MSK: No joint pain

## 2020-05-16 NOTE — CONSULT NOTE ADULT - ASSESSMENT
80 yo woman with PMH of HTN, HLD, Afib on Eliquis, COPD on 2L of O2 at home (not compliant with continuous use), CVA (no reported deficits) hx of carotid stenosis s/p Right CEA, thoracic, abdominal aortic aneurysm presents from home in setting of 1 day of shortness of breath. Patient states that she was at rest when onset of symptoms occurred. Denies associated CP, palpitations, orthopnea. + Nausea    NÉSTOR vs. CKD III  Baseline serum Cr 1.1 in 2/2020  Scr now slightly elevated on admission 1.33   ? NÉSTOR in the setting of poor oral intake   Monitor BMP and urine output   Check renal sonogram if possible to follow up undetermined lesion on CT abdomen done 8/2019   s/p CT chest with IV contrast today. Monitor for KATIE  Avoid nephrotoxins     HTN  BP controlled on current meds   low salt diet     Anemia  Hb low  Check iron studies

## 2020-05-16 NOTE — ED ADULT NURSE NOTE - OBJECTIVE STATEMENT
Patient presented to ED via ambulance from home c/o dizziness and expiratory wheezing as per EMS. EMS stated family called 911 because of right eye decreased vision and dizziness for 2/3 days. Patient has a history of COPD and has Oxygen at home but patient does not comply with O2 as per EMS, patient was hypoxic and wheezing, EMS put patient on 3 liters and gave her a neb and symptoms improved, also EMS removed a dirty thing inside her eye and patient was able to see normally. Patient also c/o bilateral legs edema but this complaint is following up by her PCP.

## 2020-05-16 NOTE — ED PROVIDER NOTE - PROGRESS NOTE DETAILS
Rigoberto Olson, PGY 2: O2 sat at 95 on 5L NC. NAD, not tachypneic, tachycardiac possible due to recent albuterol use. Rigoberto Olson, PGY 2: patient requiring 4 L o2, and elevated trop w. 1.3 Cr. concerning to nstemi, will get rpt trop and admit for copd exacerbation. patient is continue to be tachycardiac to 101. possible due to anemia of 9 and baseline of 11. fecal occult blood is sent. patient is taking eliquis for Afib, will get cta for PE.

## 2020-05-16 NOTE — H&P ADULT - PROBLEM SELECTOR PLAN 2
Unclear if shortness of breath is of cardiac equivalent  Trend CE with CK and CKMB  Monitor on tele  C/W Aspirin

## 2020-05-16 NOTE — H&P ADULT - PROBLEM SELECTOR PLAN 6
On Eliquis outpatient- no further VTE ppx needed On Eliquis outpatient- no further VTE ppx needed  Social work/CM consult. Patient may need assistance at home.  PT estheral

## 2020-05-16 NOTE — H&P ADULT - HISTORY OF PRESENT ILLNESS
80 yo woman with PMH of HTN, HLD, Afib on Eliquis, COPD on 2L of O2 at home (not compliant with continuos use), CVA (no reported deficits) presents from home in setting of 1 day of shortness of breath. Patient states that she was at rest when onset of symptoms occurred. Denies associated CP, palpitations, orthopnea. Felt that there was a wheeze to her breathing earlier today. Typically has a chronic cough with intermittent productive sputum white in color. Denies rhinorrhea, nasal congestion, sore throat. Patient states that she intermittently utilizes a nebulizer. Denies use of inhalers. Patient also endorse intermittent dizziness. Denies headache of focal limb weakness. Also endorses LE edema that has been present for 1 month. Per ED charting patient was found to be hypoxic and wheezing by EMS and patient was given a nebulizer. 78 yo woman with PMH of HTN, HLD, Afib on Eliquis, COPD on 2L of O2 at home (not compliant with continuous use), CVA (no reported deficits) hx of carotid stenosis s/p Right CEA, thoracic, abdominal aortic aneurysm presents from home in setting of 1 day of shortness of breath. Patient states that she was at rest when onset of symptoms occurred. Denies associated CP, palpitations, orthopnea. Felt that there was a wheeze to her breathing earlier today. Typically has a chronic cough with intermittent productive sputum white in color. Denies rhinorrhea, nasal congestion, sore throat. Patient states that she intermittently utilizes a nebulizer. Denies use of inhalers. Patient also endorse intermittent dizziness. Endorses nausea in the day Denies headache of focal limb weakness. Also endorses LE edema that has been present for 1 month. Per ED charting patient was found to be hypoxic and wheezing by EMS and patient was given a nebulizer.     Per daughter, patient's COPD seems to be progressively worse. Doing things at home she get easily shortness of breath. Per daughter she appeared to more tired. LE edema per daughter seems to be going on for the past 5 days. Went to PMD had a CXR: was reported to be normal. Have not heard about any abnormalities of the labs. Per daughter reported weight loss. ~10 lb weight over 4 months. Per daughter, patient is a terrible eater. With regards to right vision loss, was temporary in light of discharge or mucus obstructing her eye field. Once eye was cleaned, vision improved.

## 2020-05-16 NOTE — ED PROVIDER NOTE - ATTENDING CONTRIBUTION TO CARE
I saw and evaluated patient with resident. I discussed H+P and MDM with resident. I agree with the statements made by the resident unless otherwise noted.    The care of this patient was in support of the [facility] countermeasures to Covid-19.

## 2020-05-16 NOTE — H&P ADULT - ASSESSMENT
78 yo woman with PMH of HTN, HLD, Afib on Eliquis, COPD on 2L of O2 at home (not compliant with continuos use), CVA (no reported deficits) presents from home in setting of 1 day of shortness of breath. 80 yo woman with PMH of HTN, HLD, Afib on Eliquis, COPD on 2L of O2 at home (not compliant with continuous use), CVA (no reported deficits), thoracic aorta aneurysm presents from home in setting of 1 day of shortness of breath.

## 2020-05-16 NOTE — CONSULT NOTE ADULT - SUBJECTIVE AND OBJECTIVE BOX
[facility] [place] [facility]   MD Damon Salgado MD Fatima Sheikh, D.O. Ruoru Wong, PA    From 7 AM - 5 PM:  OFFICE: [phone]  Dr. Coats cell: [phone]  Dr. Hudson Cell: [phone]  Dr. Schroeder cell: [phone]  SAV Alvarado cell: [phone]    From 5 PM - 7 AM: Answering Service: [phone]      -- INITIAL RENAL CONSULT NOTE  --------------------------------------------------------------------------------  HPI: 78 yo woman with PMH of HTN, HLD, Afib on Eliquis, COPD on 2L of O2 at home (not compliant with continuous use), CVA (no reported deficits) hx of carotid stenosis s/p Right CEA, thoracic, abdominal aortic aneurysm presents from home in setting of 1 day of shortness of breath. Patient states that she was at rest when onset of symptoms occurred. Denies associated CP, palpitations, orthopnea. Pt admits to significant Nausea which has caused poor appetite  Nephrology consulted for NÉSTOR     PAST HISTORY  --------------------------------------------------------------------------------  PAST MEDICAL & SURGICAL HISTORY:  Hypertension  Cerebrovascular accident (CVA)  Former smoker  Gallstones  Hyperlipidemia  Dizziness  Stenosis of carotid artery: may 2017  COPD (chronic obstructive pulmonary disease)  H/O carotid endarterectomy    FAMILY HISTORY:  No pertinent family history in first degree relatives    PAST SOCIAL HISTORY:    ALLERGIES & MEDICATIONS  --------------------------------------------------------------------------------  Allergies    No Known Allergies    Intolerances      Standing Inpatient Medications  albuterol/ipratropium for Nebulization 3 milliLiter(s) Nebulizer every 6 hours  amLODIPine   Tablet 2.5 milliGRAM(s) Oral daily  apixaban 2.5 milliGRAM(s) Oral every 12 hours  aspirin enteric coated 81 milliGRAM(s) Oral daily  atorvastatin 10 milliGRAM(s) Oral at bedtime    PRN Inpatient Medications      REVIEW OF SYSTEMS  --------------------------------------------------------------------------------  Gen: No fevers/chills  Skin: No rashes  Head/Eyes/Ears: Normal hearing,  Normal vision   Respiratory: No dyspnea, cough  CV: No chest pain  GI: No abdominal pain, diarrhea, constipation, nausea, vomiting  : No dysuria, hematuria  MSK: No  edema  Heme: No easy bruising or bleeding  Psych: No significant depression    All other systems were reviewed and are negative, except as noted.    VITALS/PHYSICAL EXAM  --------------------------------------------------------------------------------  T(C): 36.4 (05-16-20 @ 08:48), Max: 37 (05-16-20 @ 04:47)  HR: 99 (05-16-20 @ 08:48) (97 - 103)  BP: 122/72 (05-16-20 @ 09:32) (117/61 - 154/77)  RR: 18 (05-16-20 @ 08:48) (18 - 20)  SpO2: 99% (05-16-20 @ 08:48) (99% - 100%)  Wt(kg): --    Physical Exam:  	Gen: NAD  	HEENT: MMM  	Pulm: CTA B/L  	CV: S1S2  	Abd: Soft, +BS   	Ext: No LE edema B/L  	Neuro: Awake  	Skin: Warm and dry    LABS/STUDIES  --------------------------------------------------------------------------------              9.4    8.74  >-----------<  384      [05-16-20 @ 00:27]              31.4     143  |  101  |  16  ----------------------------<  118      [05-16-20 @ 00:27]  4.4   |  27  |  1.33        Ca     9.8     [05-16-20 @ 00:27]    TPro  7.3  /  Alb  3.9  /  TBili  0.8  /  DBili  x   /  AST  20  /  ALT  9   /  AlkPhos  80  [05-16-20 @ 00:27]    PT/INR: PT 12.6 , INR 1.10       [05-16-20 @ 00:27]  PTT: 25.0       [05-16-20 @ 00:27]      Creatinine Trend:  SCr 1.33 [05-16 @ 00:27]    Urinalysis - [02-02-20 @ 17:30]      Color COLORLESS / Appearance CLEAR / SG 1.010 / pH 7.5      Gluc NEGATIVE / Ketone NEGATIVE  / Bili NEGATIVE / Urobili NORMAL       Blood NEGATIVE / Protein NEGATIVE / Leuk Est TRACE / Nitrite POSITIVE      RBC 0-2 / WBC 6-10 / Hyaline NEGATIVE / Gran  / Sq Epi OCC / Non Sq Epi  / Bacteria MANY      HbA1c 5.7      [06-03-18 @ 08:41]

## 2020-05-16 NOTE — H&P ADULT - NSHPPHYSICALEXAM_GEN_ALL_CORE
Vital Signs Last 24 Hrs  T(C): 36.7 (16 May 2020 01:21), Max: 36.7 (16 May 2020 00:05)  T(F): 98.1 (16 May 2020 01:21), Max: 98.1 (16 May 2020 00:05)  HR: 101 (16 May 2020 01:21) (100 - 103)  BP: 117/61 (16 May 2020 01:21) (117/61 - 154/65)  BP(mean): --  RR: 18 (16 May 2020 01:21) (18 - 20)  SpO2: 99% (16 May 2020 01:21) (99% - 100%) Vital Signs Last 24 Hrs  T(C): 36.7 (16 May 2020 01:21), Max: 36.7 (16 May 2020 00:05)  T(F): 98.1 (16 May 2020 01:21), Max: 98.1 (16 May 2020 00:05)  HR: 101 (16 May 2020 01:21) (100 - 103)  BP: 117/61 (16 May 2020 01:21) (117/61 - 154/65)  BP(mean): --  RR: 18 (16 May 2020 01:21) (18 - 20)  SpO2: 99% (16 May 2020 01:21) (99% - 100%)      PHYSICAL EXAM:  GENERAL: NAD, well-groomed, well-developed  HEAD:  Atraumatic, Normocephalic  EYES: EOMI, PERRLA, conjunctiva and sclera clear  ENMT: No tonsillar erythema, exudates, or enlargement; Moist mucous membranes, Good dentition, No lesions  NERVOUS SYSTEM:  Alert & Oriented X3, Good concentration; Motor Strength 5/5 B/L upper and lower extremities;   CHEST/LUNG: Respirations non labored. Speaking in full sentences. Scant expiratory wheezes in apical lung fields. No rhonchi  HEART: Tachycardic +S1 S2; No appreciable murmurs, rubs, or gallops  ABDOMEN: Soft, Nontender, Nondistended; Bowel sounds present  EXTREMITIES:  2+ radial and DP pulse B/L, No cyanosis. +2 pitting LE edema up to mid shin  SKIN: Warm and dry No rashes or lesions

## 2020-05-16 NOTE — H&P ADULT - ATTENDING COMMENTS
Dr. Conrado Hines accepted patient's case from the ED and requested in house hospitalist team to complete admission. Patient was previously unknown to me. Patient was assigned to me by hospitalist in charge. My involvement in this case consisted only of the initial history, physical and management plan. Dr. Hines to assume care in AM and thereafter. Case discussed in detail with overnight medicine NP/PA Dr. Conrado Hines accepted patient's case from the ED and requested in house hospitalist team to complete admission. Patient was previously unknown to me. Patient was assigned to me by hospitalist in charge. My involvement in this case consisted only of the initial history, physical and management plan. Dr. Hines to assume care in AM and thereafter. Case discussed in detail with overnight medicine NP/PA covering 2D [place]elyn [phone]

## 2020-05-17 LAB
ANION GAP SERPL CALC-SCNC: 13 MMOL/L — SIGNIFICANT CHANGE UP (ref 5–17)
BUN SERPL-MCNC: 19 MG/DL — SIGNIFICANT CHANGE UP (ref 7–23)
CALCIUM SERPL-MCNC: 8.9 MG/DL — SIGNIFICANT CHANGE UP (ref 8.4–10.5)
CHLORIDE SERPL-SCNC: 100 MMOL/L — SIGNIFICANT CHANGE UP (ref 96–108)
CO2 SERPL-SCNC: 26 MMOL/L — SIGNIFICANT CHANGE UP (ref 22–31)
CREAT SERPL-MCNC: 1.23 MG/DL — SIGNIFICANT CHANGE UP (ref 0.5–1.3)
GLUCOSE SERPL-MCNC: 92 MG/DL — SIGNIFICANT CHANGE UP (ref 70–99)
HCT VFR BLD CALC: 30.9 % — LOW (ref 34.5–45)
HGB BLD-MCNC: 8.9 G/DL — LOW (ref 11.5–15.5)
MCHC RBC-ENTMCNC: 23.6 PG — LOW (ref 27–34)
MCHC RBC-ENTMCNC: 28.8 GM/DL — LOW (ref 32–36)
MCV RBC AUTO: 82 FL — SIGNIFICANT CHANGE UP (ref 80–100)
NRBC # BLD: 0 /100 WBCS — SIGNIFICANT CHANGE UP (ref 0–0)
PLATELET # BLD AUTO: 405 K/UL — HIGH (ref 150–400)
POTASSIUM SERPL-MCNC: 4.2 MMOL/L — SIGNIFICANT CHANGE UP (ref 3.5–5.3)
POTASSIUM SERPL-SCNC: 4.2 MMOL/L — SIGNIFICANT CHANGE UP (ref 3.5–5.3)
RBC # BLD: 3.77 M/UL — LOW (ref 3.8–5.2)
RBC # FLD: 14.9 % — HIGH (ref 10.3–14.5)
SODIUM SERPL-SCNC: 139 MMOL/L — SIGNIFICANT CHANGE UP (ref 135–145)
WBC # BLD: 7.46 K/UL — SIGNIFICANT CHANGE UP (ref 3.8–10.5)
WBC # FLD AUTO: 7.46 K/UL — SIGNIFICANT CHANGE UP (ref 3.8–10.5)

## 2020-05-17 PROCEDURE — 99233 SBSQ HOSP IP/OBS HIGH 50: CPT

## 2020-05-17 PROCEDURE — 99232 SBSQ HOSP IP/OBS MODERATE 35: CPT

## 2020-05-17 RX ORDER — ACETAMINOPHEN 500 MG
650 TABLET ORAL EVERY 6 HOURS
Refills: 0 | Status: DISCONTINUED | OUTPATIENT
Start: 2020-05-17 | End: 2020-05-20

## 2020-05-17 RX ORDER — FERROUS SULFATE 325(65) MG
325 TABLET ORAL DAILY
Refills: 0 | Status: DISCONTINUED | OUTPATIENT
Start: 2020-05-17 | End: 2020-05-20

## 2020-05-17 RX ADMIN — Medication 3 MILLILITER(S): at 06:13

## 2020-05-17 RX ADMIN — ATORVASTATIN CALCIUM 10 MILLIGRAM(S): 80 TABLET, FILM COATED ORAL at 21:34

## 2020-05-17 RX ADMIN — Medication 650 MILLIGRAM(S): at 09:21

## 2020-05-17 RX ADMIN — CEFTRIAXONE 100 MILLIGRAM(S): 500 INJECTION, POWDER, FOR SOLUTION INTRAMUSCULAR; INTRAVENOUS at 20:12

## 2020-05-17 RX ADMIN — Medication 3 MILLILITER(S): at 00:40

## 2020-05-17 RX ADMIN — AMLODIPINE BESYLATE 2.5 MILLIGRAM(S): 2.5 TABLET ORAL at 06:13

## 2020-05-17 RX ADMIN — Medication 3 MILLILITER(S): at 11:40

## 2020-05-17 RX ADMIN — APIXABAN 2.5 MILLIGRAM(S): 2.5 TABLET, FILM COATED ORAL at 06:13

## 2020-05-17 RX ADMIN — APIXABAN 2.5 MILLIGRAM(S): 2.5 TABLET, FILM COATED ORAL at 20:11

## 2020-05-17 RX ADMIN — Medication 81 MILLIGRAM(S): at 11:40

## 2020-05-17 RX ADMIN — TIOTROPIUM BROMIDE 1 CAPSULE(S): 18 CAPSULE ORAL; RESPIRATORY (INHALATION) at 11:41

## 2020-05-17 RX ADMIN — Medication 325 MILLIGRAM(S): at 20:11

## 2020-05-17 NOTE — CONSULT NOTE ADULT - ASSESSMENT
78 yo woman with PMH of HTN, HLD, Afib on Eliquis, COPD on 2L of O2 at home (not compliant with continuous use), CVA (no reported deficits) hx of carotid stenosis s/p Right CEA, thoracic, abdominal aortic aneurysm presents from home in setting of 1 day of shortness of breath. Afebrile here, no leukocytosis, CTA negative for PE, negative for pneumonia, UA positive, urine cx pending. No blood cx 80 yo woman with PMH of HTN, HLD, Afib on Eliquis, COPD on 2L of O2 at home (not compliant with continuous use), CVA (no reported deficits) hx of carotid stenosis s/p Right CEA, thoracic, abdominal aortic aneurysm presents from home in setting of 1 day of shortness of breath. Afebrile here, no leukocytosis, CTA negative for PE, negative for pneumonia, UA positive, urine cx pending. No blood cx      SOB- ? infectious vs cardiogenic ? COPD  UTI    Suggest:    *SOB-? sepsis / underlying COPD/ wakes up at 4 am with breathlessness ? PND    f/u echo    check  blood cx   f/u urine cx    appreciate pulmonary recs      UTI   c/w ceftriaxone    f/u urine cx    check blood cx   f/u renal USG       Above was discussed with team 78 yo woman with PMH of HTN, HLD, Afib on Eliquis, COPD on 2L of O2 at home (not compliant with continuous use), CVA (no reported deficits) hx of carotid stenosis s/p Right CEA, thoracic, abdominal aortic aneurysm presents from home in setting of 1 day of shortness of breath. Afebrile here, no leukocytosis, CTA negative for PE, negative for pneumonia, UA positive, urine cx pending. No blood cx    NON TOXIC appearing. Off oxygen.   SOB- ? infectious vs cardiogenic ? COPD  UTI  NÉSTOR       Suggest:    *SOB-? sepsis / underlying COPD/ wakes up at 4 am with breathlessness ? PND    f/u echo    check  blood cx   f/u urine cx    appreciate pulmonary recs      UTI   c/w ceftriaxone    f/u urine cx    check blood cx   f/u renal USG       Above was discussed with team

## 2020-05-17 NOTE — PROGRESS NOTE ADULT - PROBLEM SELECTOR PLAN 6
On Eliquis outpatient- no further VTE ppx needed  Social work/CM consult. Patient may need assistance at home.  PT estheral

## 2020-05-17 NOTE — CONSULT NOTE ADULT - SUBJECTIVE AND OBJECTIVE BOX
Patient is a 79y old  Female who presents with a chief complaint of shortness of breath (17 May 2020 13:26)    HPI:  80 yo woman with PMH of HTN, HLD, Afib on Eliquis, COPD on 2L of O2 at home (not compliant with continuous use), CVA (no reported deficits) hx of carotid stenosis s/p Right CEA, thoracic, abdominal aortic aneurysm presents from home in setting of 1 day of shortness of breath. Patient states that she was at rest when onset of symptoms occurred. Denies associated CP, palpitations, orthopnea. Felt that there was a wheeze to her breathing earlier today. Typically has a chronic cough with intermittent productive sputum white in color. Denies rhinorrhea, nasal congestion, sore throat. Patient states that she intermittently utilizes a nebulizer. Denies use of inhalers. Patient also endorse intermittent dizziness. Endorses nausea in the day Denies headache of focal limb weakness. Also endorses LE edema that has been present for 1 month. Per ED charting patient was found to be hypoxic and wheezing by EMS and patient was given a nebulizer.     Per daughter, patient's COPD seems to be progressively worse. Doing things at home she get easily shortness of breath. Per daughter she appeared to more tired. LE edema per daughter seems to be going on for the past 5 days. Went to PMD had a CXR: was reported to be normal. Have not heard about any abnormalities of the labs. Per daughter reported weight loss. ~10 lb weight over 4 months. Per daughter, patient is a terrible eater. With regards to right vision loss, was temporary in light of discharge or mucus obstructing her eye field. Once eye was cleaned, vision improved. (16 May 2020 03:58)    Above reviewed.     prior hospital charts reviewed [x  ]  primary team notes reviewed [ x ]  other consultant notes reviewed [x  ]    PAST MEDICAL & SURGICAL HISTORY:  Hypertension  Cerebrovascular accident (CVA)  Former smoker  Gallstones  Hyperlipidemia  Dizziness  Stenosis of carotid artery: may 2017  COPD (chronic obstructive pulmonary disease)  H/O carotid endarterectomy      Allergies  No Known Allergies        ANTIMICROBIALS (past 90 days)  MEDICATIONS  (STANDING):  cefTRIAXone   IVPB   100 mL/Hr IV Intermittent (05-16-20 @ 21:49)        ANTIMICROBIALS:    cefTRIAXone   IVPB 1000 every 24 hours      OTHER MEDS: MEDICATIONS  (STANDING):  acetaminophen   Tablet .. 650 every 6 hours PRN  albuterol/ipratropium for Nebulization 3 every 6 hours  amLODIPine   Tablet 2.5 daily  apixaban 2.5 every 12 hours  aspirin enteric coated 81 daily  atorvastatin 10 at bedtime  tiotropium 18 MICROgram(s) Capsule 1 daily      SOCIAL HISTORY:   hx smoking  non-smoker    FAMILY HISTORY:  No pertinent family history in first degree relatives      REVIEW OF SYSTEMS  [  ] ROS unobtainable because:    [  ] All other systems negative except as noted below:	    Constitutional:  [ ] fever [ ] chills  [ ] weight loss  [ ] weakness  Skin:  [ ] rash [ ] phlebitis	  Eyes: [ ] icterus [ ] pain  [ ] discharge	  ENMT: [ ] sore throat  [ ] thrush [ ] ulcers [ ] exudates  Respiratory: [ ] dyspnea [ ] hemoptysis [ ] cough [ ] sputum	  Cardiovascular:  [ ] chest pain [ ] palpitations [ ] edema	  Gastrointestinal:  [ ] nausea [ ] vomiting [ ] diarrhea [ ] constipation [ ] pain	  Genitourinary:  [ ] dysuria [ ] frequency [ ] hematuria [ ] discharge [ ] flank pain  [ ] incontinence  Musculoskeletal:  [ ] myalgias [ ] arthralgias [ ] arthritis  [ ] back pain  Neurological:  [ ] headache [ ] seizures  [ ] confusion/altered mental status  Psychiatric:  [ ] anxiety [ ] depression	  Hematology/Lymphatics:  [ ] lymphadenopathy  Endocrine:  [ ] adrenal [ ] thyroid  Allergic/Immunologic:	 [ ] transplant [ ] seasonal    Vital Signs Last 24 Hrs  T(F): 98.6 (05-17-20 @ 13:13), Max: 98.6 (05-16-20 @ 04:47)    Vital Signs Last 24 Hrs  HR: 97 (05-17-20 @ 13:13) (90 - 109)  BP: 115/60 (05-17-20 @ 13:13) (115/60 - 138/71)  RR: 18 (05-17-20 @ 13:13)  SpO2: 91% (05-17-20 @ 13:13) (91% - 100%)  Wt(kg): --    PHYSICAL EXAM:  Constitutional: non-toxic, no distress  HEAD/EYES: anicteric, no conjunctival injection  ENT:  supple, no thrush  Cardiovascular:   normal S1, S2, no murmur, no edema  Respiratory:  clear BS bilaterally, no wheezes, no rales  GI:  soft, non-tender, normal bowel sounds  :  no morales, no CVA tenderness  Musculoskeletal:  no synovitis, normal ROM  Neurologic: awake and alert, normal strength, no focal findings  Skin:  no rash, no erythema, no phlebitis  Heme/Onc: no lymphadenopathy   Psychiatric:  awake, alert, appropriate mood                                8.9    7.46  )-----------( 405      ( 17 May 2020 05:51 )             30.9     05-17    139  |  100  |  19  ----------------------------<  92  4.2   |  26  |  1.23    Ca    8.9      17 May 2020 05:51  Phos  4.2     05-16  Mg     2.0     05-16    TPro  7.3  /  Alb  3.9  /  TBili  0.8  /  DBili  x   /  AST  20  /  ALT  9<L>  /  AlkPhos  80  05-16      Urinalysis Basic - ( 16 May 2020 14:55 )    Color: Light Yellow / Appearance: Clear / SG: >1.050 / pH: x  Gluc: x / Ketone: Negative  / Bili: Negative / Urobili: Negative   Blood: x / Protein: Trace / Nitrite: Positive   Leuk Esterase: Large / RBC: 5 /hpf /  /HPF   Sq Epi: x / Non Sq Epi: 1 /hpf / Bacteria: Many        MICROBIOLOGY:                            RADIOLOGY:  imaging below personally reviewed Patient is a 79y old  Female who presents with a chief complaint of shortness of breath (17 May 2020 13:26)    HPI:  78 yo woman with PMH of HTN, HLD, Afib on Eliquis, COPD on 2L of O2 at home (not compliant with continuous use), CVA (no reported deficits) hx of carotid stenosis s/p Right CEA, thoracic, abdominal aortic aneurysm presents from home in setting of 1 day of shortness of breath. Patient states that she was at rest when onset of symptoms occurred. Denies associated CP, palpitations, orthopnea. Felt that there was a wheeze to her breathing earlier today. Typically has a chronic cough with intermittent productive sputum white in color. Denies rhinorrhea, nasal congestion, sore throat. Patient states that she intermittently utilizes a nebulizer. Denies use of inhalers. Patient also endorse intermittent dizziness. Endorses nausea in the day Denies headache of focal limb weakness. Also endorses LE edema that has been present for 1 month. Per ED charting patient was found to be hypoxic and wheezing by EMS and patient was given a nebulizer.     Per daughter, patient's COPD seems to be progressively worse. Doing things at home she get easily shortness of breath. Per daughter she appeared to more tired. LE edema per daughter seems to be going on for the past 5 days. Went to PMD had a CXR: was reported to be normal. Have not heard about any abnormalities of the labs. Per daughter reported weight loss. ~10 lb weight over 4 months. Per daughter, patient is a terrible eater. With regards to right vision loss, was temporary in light of discharge or mucus obstructing her eye field. Once eye was cleaned, vision improved. (16 May 2020 03:58)    Above reviewed. Patient is communicative but at times deviates from the subject.   Reports she presented with SOB. Has occasional cough   Has to wake up at 4 AM as she feels she is getting SOB for 1 year. Reports swelling in both legs for > 3 weeks.   Reports dysuria   Denied diarrhea, abdominal pain.     prior hospital charts reviewed [x  ]  primary team notes reviewed [ x ]  other consultant notes reviewed [x  ]    PAST MEDICAL & SURGICAL HISTORY:  Hypertension  Cerebrovascular accident (CVA)  Former smoker  Gallstones  Hyperlipidemia  Dizziness  Stenosis of carotid artery: may 2017  COPD (chronic obstructive pulmonary disease)  H/O carotid endarterectomy    Allergies  No Known Allergies    ANTIMICROBIALS (past 90 days)  MEDICATIONS  (STANDING):  cefTRIAXone   IVPB   100 mL/Hr IV Intermittent (05-16-20 @ 21:49)    ANTIMICROBIALS:    cefTRIAXone   IVPB 1000 every 24 hours      OTHER MEDS: MEDICATIONS  (STANDING):  acetaminophen   Tablet .. 650 every 6 hours PRN  albuterol/ipratropium for Nebulization 3 every 6 hours  amLODIPine   Tablet 2.5 daily  apixaban 2.5 every 12 hours  aspirin enteric coated 81 daily  atorvastatin 10 at bedtime  tiotropium 18 MICROgram(s) Capsule 1 daily      SOCIAL HISTORY:   non-smoker, no alcohol use     FAMILY HISTORY:  Patient cannot remember family history       REVIEW OF SYSTEMS  [  ] ROS unobtainable because:    [x  ] All other systems negative except as noted below:	    Constitutional:  [ ] fever [ ] chills  [ ] weight loss  [ ] weakness  Skin:  [ ] rash [ ] phlebitis	  Eyes: [ ] icterus [ ] pain  [ ] discharge	  ENMT: [ ] sore throat  [ ] thrush [ ] ulcers [ ] exudates  Respiratory: [ x] dyspnea [ ] hemoptysis [ ] cough [ ] sputum	  Cardiovascular:  [ ] chest pain [ ] palpitations [ ] edema	  Gastrointestinal:  [ ] nausea [ ] vomiting [ ] diarrhea [ ] constipation [ ] pain	  Genitourinary:  [ x] dysuria [ ] frequency [ ] hematuria [ ] discharge [ ] flank pain  [ ] incontinence  Musculoskeletal:  [ ] myalgias [ ] arthralgias [ ] arthritis  [ ] back pain  Neurological:  [ ] headache [ ] seizures  [ ] confusion/altered mental status  Psychiatric:  [ ] anxiety [ ] depression	  Hematology/Lymphatics:  [ ] lymphadenopathy  Endocrine:  [ ] adrenal [ ] thyroid  Allergic/Immunologic:	 [ ] transplant [ ] seasonal    Vital Signs Last 24 Hrs  T(F): 98.6 (05-17-20 @ 13:13), Max: 98.6 (05-16-20 @ 04:47)    Vital Signs Last 24 Hrs  HR: 97 (05-17-20 @ 13:13) (90 - 109)  BP: 115/60 (05-17-20 @ 13:13) (115/60 - 138/71)  RR: 18 (05-17-20 @ 13:13)  SpO2: 91% (05-17-20 @ 13:13) (91% - 100%)  Wt(kg): --    PHYSICAL EXAM:  Constitutional: non-toxic, no distress  HEAD/EYES: anicteric, no conjunctival injection  ENT:  supple, no thrush  Cardiovascular:   normal S1, S2, no murmur, no edema  Respiratory:  clear BS bilaterally, no wheezes, no rales  GI:  soft, non-tender, normal bowel sounds  :  no morales, no CVA tenderness  Musculoskeletal:  no synovitis, normal ROM  Extremities: b/l lower extremity edema   Neurologic: awake and alert, normal strength, no focal findings  Skin:  no rash, no erythema, no phlebitis  Heme/Onc: no lymphadenopathy   Psychiatric:  awake, alert, appropriate mood                            8.9    7.46  )-----------( 405      ( 17 May 2020 05:51 )             30.9     05-17    139  |  100  |  19  ----------------------------<  92  4.2   |  26  |  1.23    Ca    8.9      17 May 2020 05:51  Phos  4.2     05-16  Mg     2.0     05-16    TPro  7.3  /  Alb  3.9  /  TBili  0.8  /  DBili  x   /  AST  20  /  ALT  9<L>  /  AlkPhos  80  05-16      Urinalysis Basic - ( 16 May 2020 14:55 )    Color: Light Yellow / Appearance: Clear / SG: >1.050 / pH: x  Gluc: x / Ketone: Negative  / Bili: Negative / Urobili: Negative   Blood: x / Protein: Trace / Nitrite: Positive   Leuk Esterase: Large / RBC: 5 /hpf /  /HPF   Sq Epi: x / Non Sq Epi: 1 /hpf / Bacteria: Many        MICROBIOLOGY:  Pending       RADIOLOGY:   < from: CT Angio Chest w/ IV Cont (05.16.20 @ 03:58) >  IMPRESSION:     No pulmonary emboli.      < end of copied text > Patient is a 79y old  Female who presents with a chief complaint of shortness of breath (17 May 2020 13:26)    HPI:  78 yo woman with PMH of HTN, HLD, Afib on Eliquis, COPD on 2L of O2 at home (not compliant with continuous use), CVA (no reported deficits) hx of carotid stenosis s/p Right CEA, thoracic, abdominal aortic aneurysm presents from home in setting of 1 day of shortness of breath. Patient states that she was at rest when onset of symptoms occurred. Denies associated CP, palpitations, orthopnea. Felt that there was a wheeze to her breathing earlier today. Typically has a chronic cough with intermittent productive sputum white in color. Denies rhinorrhea, nasal congestion, sore throat. Patient states that she intermittently utilizes a nebulizer. Denies use of inhalers. Patient also endorse intermittent dizziness. Endorses nausea in the day Denies headache of focal limb weakness. Also endorses LE edema that has been present for 1 month. Per ED charting patient was found to be hypoxic and wheezing by EMS and patient was given a nebulizer.     Per daughter, patient's COPD seems to be progressively worse. Doing things at home she get easily shortness of breath. Per daughter she appeared to more tired. LE edema per daughter seems to be going on for the past 5 days. Went to PMD had a CXR: was reported to be normal. Have not heard about any abnormalities of the labs. Per daughter reported weight loss. ~10 lb weight over 4 months. Per daughter, patient is a terrible eater. With regards to right vision loss, was temporary in light of discharge or mucus obstructing her eye field. Once eye was cleaned, vision improved. (16 May 2020 03:58)    Above reviewed. Patient is communicative but at times deviates from the subject.   Reports she presented with SOB. Has occasional cough   Has to wake up at 4 AM as she feels she is getting SOB for 1 year. Reports swelling in both legs for > 3 weeks.   Reports dysuria   Denied diarrhea, abdominal pain.     prior hospital charts reviewed [x  ]  primary team notes reviewed [ x ]  other consultant notes reviewed [x  ]      PAST MEDICAL & SURGICAL HISTORY:  Hypertension  Cerebrovascular accident (CVA)  Former smoker  Gallstones  Hyperlipidemia  Dizziness  Stenosis of carotid artery: may 2017  COPD (chronic obstructive pulmonary disease)  H/O carotid endarterectomy      Allergies  No Known Allergies      ANTIMICROBIALS (past 90 days)  MEDICATIONS  (STANDING):  cefTRIAXone   IVPB   100 mL/Hr IV Intermittent (05-16-20 @ 21:49)    ANTIMICROBIALS:    cefTRIAXone   IVPB 1000 every 24 hours      OTHER MEDS: MEDICATIONS  (STANDING):  acetaminophen   Tablet .. 650 every 6 hours PRN  albuterol/ipratropium for Nebulization 3 every 6 hours  amLODIPine   Tablet 2.5 daily  apixaban 2.5 every 12 hours  aspirin enteric coated 81 daily  atorvastatin 10 at bedtime  tiotropium 18 MICROgram(s) Capsule 1 daily      SOCIAL HISTORY:   non-smoker, no alcohol use     FAMILY HISTORY:  Patient cannot remember family history       REVIEW OF SYSTEMS  [  ] ROS unobtainable because:    [x  ] All other systems negative except as noted below:	    Constitutional:  [ ] fever [ ] chills  [ ] weight loss  [ ] weakness  Skin:  [ ] rash [ ] phlebitis	  Eyes: [ ] icterus [ ] pain  [ ] discharge	  ENMT: [ ] sore throat  [ ] thrush [ ] ulcers [ ] exudates  Respiratory: [ x] dyspnea [ ] hemoptysis [ ] cough [ ] sputum	  Cardiovascular:  [ ] chest pain [ ] palpitations [ ] edema	  Gastrointestinal:  [ ] nausea [ ] vomiting [ ] diarrhea [ ] constipation [ ] pain	  Genitourinary:  [ x] dysuria [ ] frequency [ ] hematuria [ ] discharge [ ] flank pain  [ ] incontinence  Musculoskeletal:  [ ] myalgias [ ] arthralgias [ ] arthritis  [ ] back pain  Neurological:  [ ] headache [ ] seizures  [ ] confusion/altered mental status  Psychiatric:  [ ] anxiety [ ] depression	  Hematology/Lymphatics:  [ ] lymphadenopathy  Endocrine:  [ ] adrenal [ ] thyroid  Allergic/Immunologic:	 [ ] transplant [ ] seasonal      Vital Signs Last 24 Hrs  T(F): 98.6 (05-17-20 @ 13:13), Max: 98.6 (05-16-20 @ 04:47)      Vital Signs Last 24 Hrs  HR: 97 (05-17-20 @ 13:13) (90 - 109)  BP: 115/60 (05-17-20 @ 13:13) (115/60 - 138/71)  RR: 18 (05-17-20 @ 13:13)  SpO2: 91% (05-17-20 @ 13:13) (91% - 100%)  Wt(kg): --      PHYSICAL EXAM:  Constitutional: non-toxic, no distress, sitting in chair   HEAD/EYES: anicteric, no conjunctival injection  ENT:  supple, no thrush, rash around lips   Cardiovascular:   normal S1, S2,   Respiratory:  scoliosis, + air entry b/l   GI:  soft, non-tender, non distended  :  no morales,   Musculoskeletal:  no joint swelling   Extremities: b/l lower extremity edema   Neurologic: awake and alert, no focal findings  Skin: no erythema, no phlebitis  Vascular: palpable pulses.   Psychiatric:  awake, alert, appropriate mood                            8.9    7.46  )-----------( 405      ( 17 May 2020 05:51 )             30.9     05-17    139  |  100  |  19  ----------------------------<  92  4.2   |  26  |  1.23    Ca    8.9      17 May 2020 05:51  Phos  4.2     05-16  Mg     2.0     05-16    TPro  7.3  /  Alb  3.9  /  TBili  0.8  /  DBili  x   /  AST  20  /  ALT  9<L>  /  AlkPhos  80  05-16      Urinalysis Basic - ( 16 May 2020 14:55 )    Color: Light Yellow / Appearance: Clear / SG: >1.050 / pH: x  Gluc: x / Ketone: Negative  / Bili: Negative / Urobili: Negative   Blood: x / Protein: Trace / Nitrite: Positive   Leuk Esterase: Large / RBC: 5 /hpf /  /HPF   Sq Epi: x / Non Sq Epi: 1 /hpf / Bacteria: Many      MICROBIOLOGY:  Pending       RADIOLOGY: Imaging reviewed personally and interpretation as mentioned below.      < from: CT Angio Chest w/ IV Cont (05.16.20 @ 03:58) >  IMPRESSION:     No pulmonary emboli.

## 2020-05-17 NOTE — PROGRESS NOTE ADULT - ASSESSMENT
80 yo woman with PMH of HTN, HLD, Afib on Eliquis, COPD on 2L of O2 at home (not compliant with continuous use), CVA (no reported deficits), thoracic aorta aneurysm presents from home in setting of 1 day of shortness of breath.

## 2020-05-17 NOTE — PROGRESS NOTE ADULT - SUBJECTIVE AND OBJECTIVE BOX
[facility]   MD Damon Salgado MD, D.O. Ruoru Wong, PA    From 7 AM - 5 PM:  OFFICE: [phone]  Dr. Coats cell: [phone]  Dr. Hudson cell: [phone]  Dr. Schroeder cell: [phone]  SAV Alvarado cell: [phone]    From 5 PM - 7 AM: Answering Service: [phone]      RENAL FOLLOW UP NOTE  --------------------------------------------------------------------------------  HPI: Pt seen and examined at bedside. Denies dysuria. admits to frequency. past hx of UTIS   Denies SOB, chest pain     PAST HISTORY  --------------------------------------------------------------------------------  No significant changes to PMH, PSH, FHx, SHx, unless otherwise noted    ALLERGIES & MEDICATIONS  --------------------------------------------------------------------------------  Allergies    No Known Allergies    Intolerances      Standing Inpatient Medications  albuterol/ipratropium for Nebulization 3 milliLiter(s) Nebulizer every 6 hours  amLODIPine   Tablet 2.5 milliGRAM(s) Oral daily  apixaban 2.5 milliGRAM(s) Oral every 12 hours  aspirin enteric coated 81 milliGRAM(s) Oral daily  atorvastatin 10 milliGRAM(s) Oral at bedtime  cefTRIAXone   IVPB 1000 milliGRAM(s) IV Intermittent every 24 hours  tiotropium 18 MICROgram(s) Capsule 1 Capsule(s) Inhalation daily    PRN Inpatient Medications  acetaminophen   Tablet .. 650 milliGRAM(s) Oral every 6 hours PRN      REVIEW OF SYSTEMS  --------------------------------------------------------------------------------  General: no fever  CVS: no chest pain  RESP: no sob, no cough  ABD: no abdominal pain  : no dysuria,  MSK: no edema     VITALS/PHYSICAL EXAM  --------------------------------------------------------------------------------  T(C): 36.4 (05-17-20 @ 04:48), Max: 36.8 (05-16-20 @ 20:46)  HR: 98 (05-17-20 @ 04:48) (90 - 109)  BP: 138/71 (05-17-20 @ 04:48) (115/69 - 138/71)  RR: 18 (05-17-20 @ 04:48) (18 - 18)  SpO2: 100% (05-17-20 @ 04:48) (98% - 100%)  Wt(kg): --    Weight (kg): 52.3 (05-16-20 @ 08:00)      05-16-20 @ 07:01  -  05-17-20 @ 07:00  --------------------------------------------------------  IN: 600 mL / OUT: 300 mL / NET: 300 mL    05-17-20 @ 07:01  -  05-17-20 @ 12:33  --------------------------------------------------------  IN: 360 mL / OUT: 300 mL / NET: 60 mL      Physical Exam:  	Gen: NAD  	HEENT: MMM  	Pulm: CTA B/L  	CV: S1S2  	Abd: Soft, +BS  	Ext: + LE edema B/L                      Neuro: Awake   	Skin: Warm and Dry       LABS/STUDIES  --------------------------------------------------------------------------------              8.9    7.46  >-----------<  405      [05-17-20 @ 05:51]              30.9     139  |  100  |  19  ----------------------------<  92      [05-17-20 @ 05:51]  4.2   |  26  |  1.23        Ca     8.9     [05-17-20 @ 05:51]      Mg     2.0     [05-16-20 @ 14:48]      Phos  4.2     [05-16-20 @ 14:48]    TPro  7.3  /  Alb  3.9  /  TBili  0.8  /  DBili  x   /  AST  20  /  ALT  9   /  AlkPhos  80  [05-16-20 @ 00:27]    PT/INR: PT 12.6 , INR 1.10       [05-16-20 @ 00:27]  PTT: 25.0       [05-16-20 @ 00:27]          [05-16-20 @ 14:48]    Creatinine Trend:  SCr 1.23 [05-17 @ 05:51]  SCr 1.25 [05-16 @ 14:48]  SCr 1.33 [05-16 @ 00:27]    Urinalysis - [05-16-20 @ 14:55]      Color Light Yellow / Appearance Clear / SG >1.050 / pH 6.0      Gluc Negative / Ketone Negative  / Bili Negative / Urobili Negative       Blood Trace / Protein Trace / Leuk Est Large / Nitrite Positive      RBC 5 /  / Hyaline 2 / Gran  / Sq Epi  / Non Sq Epi 1 / Bacteria Many      Iron 30, TIBC 385, %sat 8      [05-16-20 @ 17:17]  HbA1c 5.7      [06-03-18 @ 08:41]

## 2020-05-17 NOTE — PROGRESS NOTE ADULT - SUBJECTIVE AND OBJECTIVE BOX
SUBJECTIVE / OVERNIGHT EVENTS: pt denies shortness of breath       MEDICATIONS  (STANDING):  albuterol/ipratropium for Nebulization 3 milliLiter(s) Nebulizer every 6 hours  amLODIPine   Tablet 2.5 milliGRAM(s) Oral daily  apixaban 2.5 milliGRAM(s) Oral every 12 hours  aspirin enteric coated 81 milliGRAM(s) Oral daily  atorvastatin 10 milliGRAM(s) Oral at bedtime  cefTRIAXone   IVPB 1000 milliGRAM(s) IV Intermittent every 24 hours  ferrous    sulfate 325 milliGRAM(s) Oral daily  tiotropium 18 MICROgram(s) Capsule 1 Capsule(s) Inhalation daily    MEDICATIONS  (PRN):  acetaminophen   Tablet .. 650 milliGRAM(s) Oral every 6 hours PRN Temp greater or equal to 38C (100.4F), Mild Pain (1 - 3), Moderate Pain (4 - 6)    Vital Signs Last 24 Hrs  T(C): 37.1 (17 May 2020 20:38), Max: 37.1 (17 May 2020 20:38)  T(F): 98.7 (17 May 2020 20:38), Max: 98.7 (17 May 2020 20:38)  HR: 96 (17 May 2020 20:38) (96 - 109)  BP: 112/68 (17 May 2020 20:38) (112/68 - 138/71)  BP(mean): --  RR: 19 (17 May 2020 20:38) (18 - 19)  SpO2: 91% (17 May 2020 20:38) (91% - 100%)    CAPILLARY BLOOD GLUCOSE        I&O's Summary    16 May 2020 07:01  -  17 May 2020 07:00  --------------------------------------------------------  IN: 600 mL / OUT: 300 mL / NET: 300 mL    17 May 2020 07:01  -  18 May 2020 00:27  --------------------------------------------------------  IN: 780 mL / OUT: 801 mL / NET: -21 mL        Constitutional: No fever, fatigue  Skin: No rash.  Eyes: No recent vision problems or eye pain.  ENT: No congestion, ear pain, or sore throat.  Cardiovascular: No chest pain or palpation.  Respiratory: No cough, shortness of breath, congestion, or wheezing.  Gastrointestinal: No abdominal pain, nausea, vomiting, or diarrhea.  Genitourinary: No dysuria.  Musculoskeletal: No joint swelling.  Neurologic: No headache.    PHYSICAL EXAM:  GENERAL: NAD  EYES: EOMI, PERRLA  NECK: Supple, No JVD  CHEST/LUNG: dec breath sounds at bases   HEART:  S1 , S2 +  ABDOMEN: soft , bs+  EXTREMITIES: edema+   NEUROLOGY:alert awake      LABS:                        8.9    7.46  )-----------( 405      ( 17 May 2020 05:51 )             30.9     05-17    139  |  100  |  19  ----------------------------<  92  4.2   |  26  |  1.23    Ca    8.9      17 May 2020 05:51  Phos  4.2     05-16  Mg     2.0     05-16        CARDIAC MARKERS ( 16 May 2020 14:48 )  x     / x     / 331 U/L / x     / 7.2 ng/mL      Urinalysis Basic - ( 16 May 2020 14:55 )    Color: Light Yellow / Appearance: Clear / SG: >1.050 / pH: x  Gluc: x / Ketone: Negative  / Bili: Negative / Urobili: Negative   Blood: x / Protein: Trace / Nitrite: Positive   Leuk Esterase: Large / RBC: 5 /hpf /  /HPF   Sq Epi: x / Non Sq Epi: 1 /hpf / Bacteria: Many        RADIOLOGY & ADDITIONAL TESTS:    Imaging Personally Reviewed:    Consultant(s) Notes Reviewed:      Care Discussed with Consultants/Other Providers:

## 2020-05-17 NOTE — PROGRESS NOTE ADULT - ASSESSMENT
80 yo woman with PMH of HTN, HLD, Afib on Eliquis, COPD on 2L of O2 at home (not compliant with continuous use), CVA (no reported deficits) hx of carotid stenosis s/p Right CEA, thoracic, abdominal aortic aneurysm presents from home in setting of 1 day of shortness of breath. Patient states that she was at rest when onset of symptoms occurred. Denies associated CP, palpitations, orthopnea. + Nausea    NÉSTOR vs. CKD III  Baseline serum Cr 1.1 in 2/2020  Scr now slightly elevated on admission 1.33   ? NÉSTOR in the setting of poor oral intake vs. infection  Scr slightly improved today   UA with UTI. Awaiting urine culture   Monitor BMP and urine output   Check renal sonogram if possible to follow up undetermined lesion on CT abdomen done 8/2019   Avoid nephrotoxins     HTN  BP controlled on current meds   low salt diet     Anemia  Hb low  Pt with iron deficiency. Advise to start PO iron replacement tablets

## 2020-05-18 LAB
-  AMIKACIN: SIGNIFICANT CHANGE UP
-  AMPICILLIN/SULBACTAM: SIGNIFICANT CHANGE UP
-  AMPICILLIN: SIGNIFICANT CHANGE UP
-  AZTREONAM: SIGNIFICANT CHANGE UP
-  CEFAZOLIN: SIGNIFICANT CHANGE UP
-  CEFEPIME: SIGNIFICANT CHANGE UP
-  CEFOXITIN: SIGNIFICANT CHANGE UP
-  CEFTRIAXONE: SIGNIFICANT CHANGE UP
-  CIPROFLOXACIN: SIGNIFICANT CHANGE UP
-  GENTAMICIN: SIGNIFICANT CHANGE UP
-  IMIPENEM: SIGNIFICANT CHANGE UP
-  LEVOFLOXACIN: SIGNIFICANT CHANGE UP
-  MEROPENEM: SIGNIFICANT CHANGE UP
-  NITROFURANTOIN: SIGNIFICANT CHANGE UP
-  PIPERACILLIN/TAZOBACTAM: SIGNIFICANT CHANGE UP
-  TIGECYCLINE: SIGNIFICANT CHANGE UP
-  TOBRAMYCIN: SIGNIFICANT CHANGE UP
-  TRIMETHOPRIM/SULFAMETHOXAZOLE: SIGNIFICANT CHANGE UP
CULTURE RESULTS: SIGNIFICANT CHANGE UP
CULTURE RESULTS: SIGNIFICANT CHANGE UP
METHOD TYPE: SIGNIFICANT CHANGE UP
ORGANISM # SPEC MICROSCOPIC CNT: SIGNIFICANT CHANGE UP
ORGANISM # SPEC MICROSCOPIC CNT: SIGNIFICANT CHANGE UP
SPECIMEN SOURCE: SIGNIFICANT CHANGE UP
SPECIMEN SOURCE: SIGNIFICANT CHANGE UP

## 2020-05-18 PROCEDURE — 93970 EXTREMITY STUDY: CPT | Mod: 26

## 2020-05-18 PROCEDURE — 76770 US EXAM ABDO BACK WALL COMP: CPT | Mod: 26

## 2020-05-18 PROCEDURE — 99232 SBSQ HOSP IP/OBS MODERATE 35: CPT

## 2020-05-18 PROCEDURE — 93306 TTE W/DOPPLER COMPLETE: CPT | Mod: 26

## 2020-05-18 RX ORDER — ONDANSETRON 8 MG/1
4 TABLET, FILM COATED ORAL EVERY 8 HOURS
Refills: 0 | Status: DISCONTINUED | OUTPATIENT
Start: 2020-05-18 | End: 2020-05-20

## 2020-05-18 RX ADMIN — Medication 325 MILLIGRAM(S): at 13:44

## 2020-05-18 RX ADMIN — Medication 3 MILLILITER(S): at 13:44

## 2020-05-18 RX ADMIN — Medication 3 MILLILITER(S): at 05:58

## 2020-05-18 RX ADMIN — Medication 3 MILLILITER(S): at 01:00

## 2020-05-18 RX ADMIN — Medication 81 MILLIGRAM(S): at 13:44

## 2020-05-18 RX ADMIN — ATORVASTATIN CALCIUM 10 MILLIGRAM(S): 80 TABLET, FILM COATED ORAL at 21:18

## 2020-05-18 RX ADMIN — Medication 3 MILLILITER(S): at 17:33

## 2020-05-18 RX ADMIN — APIXABAN 2.5 MILLIGRAM(S): 2.5 TABLET, FILM COATED ORAL at 17:33

## 2020-05-18 RX ADMIN — CEFTRIAXONE 100 MILLIGRAM(S): 500 INJECTION, POWDER, FOR SOLUTION INTRAMUSCULAR; INTRAVENOUS at 21:17

## 2020-05-18 RX ADMIN — APIXABAN 2.5 MILLIGRAM(S): 2.5 TABLET, FILM COATED ORAL at 05:58

## 2020-05-18 RX ADMIN — TIOTROPIUM BROMIDE 1 CAPSULE(S): 18 CAPSULE ORAL; RESPIRATORY (INHALATION) at 13:44

## 2020-05-18 RX ADMIN — AMLODIPINE BESYLATE 2.5 MILLIGRAM(S): 2.5 TABLET ORAL at 05:58

## 2020-05-18 NOTE — PHYSICAL THERAPY INITIAL EVALUATION ADULT - PLANNED THERAPY INTERVENTIONS, PT EVAL
balance training/bed mobility training/strengthening/gait training/transfer training/Pt will negotiate up/down 5  steps independently with appropriate assistive device and railing in 4 weeks

## 2020-05-18 NOTE — PHYSICAL THERAPY INITIAL EVALUATION ADULT - PERTINENT HX OF CURRENT PROBLEM, REHAB EVAL
Pt presents from home in setting of 1 day of shortness of breath.Differential includes but not limited to COPD exacerbation vs PE vs CHF exacerbation.+ Elevated troponin. NÉSTOR vs. CKD III. H/H 8.9/30.9. Pt presents from home in setting of 1 day of shortness of breath. Differential includes but not limited to COPD exacerbation vs PE vs CHF exacerbation.+ Elevated troponin. NÉSTOR vs. CKD III. H/H 8.9/30.9.

## 2020-05-18 NOTE — PHYSICAL THERAPY INITIAL EVALUATION ADULT - LIVES WITH, PROFILE
Lives alone in a house with 5 steps to enter. Pt was ambulatory at home without a device PTA. States her children always come to assist/check up on her as needed. Owns a rolling walker but does not use it/alone

## 2020-05-18 NOTE — PROGRESS NOTE ADULT - ATTENDING COMMENTS
Sangita Malloy  Pager: [phone]. If no response or past 5 pm call [phone].     Will sign off, please call with questions.
doing well on RA; would continue RA  COPD- does not need pulse ox > 95%  continue spiriva  continue NEB PRN  r/o DVT (less likley given she is already on NOAC)  close f/u with Dr Cristobal
agree with above

## 2020-05-18 NOTE — PROGRESS NOTE ADULT - ASSESSMENT
80 yo woman with PMH of HTN, HLD, Afib on Eliquis, COPD on 2L of O2 at home (not compliant with continuous use), CVA (no reported deficits) hx of carotid stenosis s/p Right CEA, thoracic, abdominal aortic aneurysm presents from home in setting of 1 day of shortness of breath. Afebrile here, no leukocytosis, CTA negative for PE, negative for pneumonia, UA positive, urine cx pending. No blood cx    NON TOXIC appearing. Off oxygen.   SOB- ? infectious vs cardiogenic ? COPD  UTI  NÉSTOR       Suggest:    *SOB-? sepsis / underlying COPD/ wakes up at 4 am with breathlessness ? PND    blood cx in lab    appreciate pulmonary recs      UTI due to UTI    c/w ceftriaxone    renal USG with no hydro   can switch to po augmentin 875 mg po bid to finish 5 days until 5/20/20 if blood cx negative

## 2020-05-18 NOTE — PROGRESS NOTE ADULT - SUBJECTIVE AND OBJECTIVE BOX
SULMA MEI  MRN-[id number]    Patient is a 79y old  Female who presents with a chief complaint of shortness of breath (16 May 2020 03:58)      HISTORY OF PRESENT ILLNESS:  78 yo woman with PMH of HTN, HLD, Afib on Eliquis, COPD on 2L of O2 at home (not compliant with continuous use), CVA (no reported deficits) hx of carotid stenosis s/p Right CEA, thoracic, abdominal aortic aneurysm admitted for sudden onset dyspnea. no fevers, chills, sick contact. no COVID exposure.  Denies rhinorrhea, nasal congestion, sore throat  states her dyspnea started 1-2 hours prior to calling EMS.  she ha s had some LE swelling b/l for a few weeks  prior to coming into the hospital  she is on 02- uses it PRN- feels excessively sleepy when she tanner wear it.   Last seen Dr Cristobal 2/2020, per his notes- unable to tolerate most medications due to nausea.  has not started the marinol that Dr Cristobal had ordered  does not recall the inhaler she was rx. Nebs working well   found to have UTI        today-  she is on room air  is comfortable. no distress. no cough.   feels much better with neb treatment      Allergies    No Known Allergies    Intolerances supplemental 02          PAST MEDICAL & SURGICAL HISTORY:  Hypertension  Cerebrovascular accident (CVA)  Former smoker  Gallstones  Hyperlipidemia  Dizziness  Stenosis of carotid artery: may 2017  COPD (chronic obstructive pulmonary disease)  H/O carotid endarterectomy          FAMILY HISTORY:  No pertinent family history in first degree relatives    Prescriptions:    Ecotrin: Last Dose Taken:  , 81 milligram(s) orally once a day  · 	atorvastatin 10 mg oral tablet: Last Dose Taken:  , 1 tab(s) orally once a day  · 	albuterol 2.5 mg/3 mL (0.083%) inhalation solution: Last Dose Taken:  , 3 milliliter(s) by nebulizer every 8 hours, As Needed  · 	amLODIPine 2.5 mg oral tablet: Last Dose Taken:  , 1 tab(s) orally once a day  · 	apixaban 2.5 mg oral tablet: Last Dose Taken:  , 1 tab(s) orally 2 times a day    Aspirin 81 MG Oral Tablet Delayed Release; TAKE 1 TABLET BY MOUTH EVERY DAY  Atorvastatin Calcium 10 MG Oral Tablet; TAKE 1 TABLET AT BEDTIME  Pantoprazole Sodium 40 MG Oral Tablet Delayed Release; TAKE 1 TABLET BY MOUTH  EVERY DAY  Stiolto Respimat 2.5-2.5 MCG/ACT Inhalation Aerosol Solution; INHALE 2 PUFFS Daily  Vitamin D TABS  Zofran 4 MG Oral Tablet; 1 tab every 6 hours as needed    SOCIAL HISTORY  Smoking History: quit smoking > 15 years ago      MEDICATIONS  (STANDING):  albuterol/ipratropium for Nebulization 3 milliLiter(s) Nebulizer every 6 hours  amLODIPine   Tablet 2.5 milliGRAM(s) Oral daily  apixaban 2.5 milliGRAM(s) Oral every 12 hours  aspirin enteric coated 81 milliGRAM(s) Oral daily  atorvastatin 10 milliGRAM(s) Oral at bedtime  cefTRIAXone   IVPB 1000 milliGRAM(s) IV Intermittent every 24 hours  ferrous    sulfate 325 milliGRAM(s) Oral daily  tiotropium 18 MICROgram(s) Capsule 1 Capsule(s) Inhalation daily    MEDICATIONS  (PRN):  acetaminophen   Tablet .. 650 milliGRAM(s) Oral every 6 hours PRN Temp greater or equal to 38C (100.4F), Mild Pain (1 - 3), Moderate Pain (4 - 6)  ondansetron    Tablet 4 milliGRAM(s) Oral every 8 hours PRN Nausea and/or Vomiting    REVIEW OF SYSTEMS:    CONSTITUTIONAL:  No fevers, chills, sweats    HEENT:  Eyes:  No diplopia or blurred vision. ENT:  No earache, sore throat or runny nose.    CARDIOVASCULAR:  No pressure, squeezing, tightness, or heaviness about the chest; no palpitations.    RESPIRATORY:  CTA bi lat no rhonchi, wheezing, crackles no respiratory distress      GASTROINTESTINAL:  No abdominal pain, nausea, vomiting or diarrhea.    GENITOURINARY:  No dysuria, frequency or urgency.    NEUROLOGIC:  grossly intact memory is poor                                      8.9    7.46  )-----------( 405      ( 17 May 2020 05:51 )             30.9   05-17    139  |  100  |  19  ----------------------------<  92  4.2   |  26  |  1.23    Ca    8.9      17 May 2020 05:51  Phos  4.2     05-16  Mg     2.0     05-16    TPro  7.3  /  Alb  3.9  /  TBili  0.8  /  DBili  x   /  AST  20  /  ALT  9<L>  /  AlkPhos  80  05-16    Vital Signs Last 24 Hrs  T(C): 36.6 (18 May 2020 04:52), Max: 37.1 (17 May 2020 20:38)  T(F): 97.8 (18 May 2020 04:52), Max: 98.7 (17 May 2020 20:38)  HR: 109 (18 May 2020 04:52) (96 - 109)  BP: 140/58 (18 May 2020 04:52) (105/63 - 140/58)  BP(mean): --  RR: 18 (18 May 2020 04:52) (18 - 19)  SpO2: 100% (18 May 2020 04:52) (91% - 100%)    PHYSICAL EXAMINATION:    GENERAL: The patient is a well-developed, well-nourished female in no apparent distress.     HEENT: Head is normocephalic and atraumatic. Extraocular muscles are intact. Mucous membranes are moist.     NECK: Supple.     LUNGS: Clear to auscultation without wheezing, rales, or rhonchi. Respirations unlabored    HEART: Regular rate and rhythm without murmur.    ABDOMEN: Soft, nontender, and nondistended.  No hepatosplenomegaly is noted.    EXTREMITIES: Without any cyanosis, clubbing, rash, lesions or edema.    NEUROLOGIC: Grossly intact              RADIOLOGY & ADDITIONAL STUDIES:  < from: CT Angio Chest w/ IV Cont (05.16.20 @ 03:58) >    EXAM:  CT ANGIO CHEST (W)AW IC                            PROCEDURE DATE:  05/16/2020            INTERPRETATION:  CLINICAL INFORMATION: Tachycardia with lower extremity edema.    COMPARISON: Chest CT 5/21/2019    PROCEDURE:   CT Angiography of the Chest.  90 ml of Omnipaque 350 was injected intravenously. 10 ml were discarded.  Sagittal and coronal reformats were performed as well as 3D (MIP) reconstructions.      FINDINGS:    LUNGS AND AIRWAYS: Patent central airways.  Biapical scarring. Emphysema.     PLEURA: No pleural effusion.    MEDIASTINUM AND TARA: No lymphadenopathy.    VESSELS: No pulmonary emboli. The ascending aorta measures 4.3 cm and descending aorta maximally measuring 3.9 cm unchanged at the same level compared to prior from5/21/2019. Calcified and noncalcified aortic plaque. Ulcerated plaque is identified in the descending thoracic aorta.    HEART: Heart size is normal. No pericardial effusion.    CHEST WALL AND LOWER NECK: Within normal limits.    VISUALIZED UPPER ABDOMEN: Status post cholecystectomy.    BONES: Spondylosis. Multiple chronic right-sided rib fractures.    IMPRESSION:     No pulmonary emboli.                        ASHWINI REYNAGA M.D., RADIOLOGY RESIDENT  This document has been electronically signed.  DOUG GARCIA M.D., ATTENDING RADIOLOGIST  This document has been electronically signed. May 16 2020  8:34AM        < end of copied text >    ECHO:  none    ASSESSMENT:  78 yo female with multiple medical problems admitted for dyspnea found to have UTI     PLAN:/REC   likely  not  COPD  exacerbation   continue nebs PRN  - continue  room air pulse ox > 92%  no radiographic or clinical findings suggestive of COVID and PCR NEG   f/u TTE  continue NOAC  continue Spiriva   UTI tx per medicine   -F/U DR Malhotra post DC in one to two weeks via [facility]  - pul with no objections to DC home when ready       Thank you for allowing me to participate in the care of this patient.  Please feel free to call me for any questions/concerns.    Jose Landin NP   [facility]  [phone] office  Cell SULMA MEI  MRN-[id number]    Patient is a 79y old  Female who presents with a chief complaint of shortness of breath (16 May 2020 03:58)      HISTORY OF PRESENT ILLNESS:  78 yo woman with PMH of HTN, HLD, Afib on Eliquis, COPD on 2L of O2 at home (not compliant with continuous use), CVA (no reported deficits) hx of carotid stenosis s/p Right CEA, thoracic, abdominal aortic aneurysm admitted for sudden onset dyspnea. no fevers, chills, sick contact. no COVID exposure.  Denies rhinorrhea, nasal congestion, sore throat  states her dyspnea started 1-2 hours prior to calling EMS.  she ha s had some LE swelling b/l for a few weeks  prior to coming into the hospital  she is on 02- uses it PRN- feels excessively sleepy when she tanner wear it.   Last seen Dr Cristobal 2/2020, per his notes- unable to tolerate most medications due to nausea.  has not started the marinol that Dr Cristobal had ordered  does not recall the inhaler she was rx. Nebs working well   found to have UTI        today-  she is on room air  is comfortable. no distress. no cough.   feels much better with neb treatment      Allergies    No Known Allergies    Intolerances supplemental 02          PAST MEDICAL & SURGICAL HISTORY:  Hypertension  Cerebrovascular accident (CVA)  Former smoker  Gallstones  Hyperlipidemia  Dizziness  Stenosis of carotid artery: may 2017  COPD (chronic obstructive pulmonary disease)  H/O carotid endarterectomy          FAMILY HISTORY:  No pertinent family history in first degree relatives    Prescriptions:    Ecotrin: Last Dose Taken:  , 81 milligram(s) orally once a day  · 	atorvastatin 10 mg oral tablet: Last Dose Taken:  , 1 tab(s) orally once a day  · 	albuterol 2.5 mg/3 mL (0.083%) inhalation solution: Last Dose Taken:  , 3 milliliter(s) by nebulizer every 8 hours, As Needed  · 	amLODIPine 2.5 mg oral tablet: Last Dose Taken:  , 1 tab(s) orally once a day  · 	apixaban 2.5 mg oral tablet: Last Dose Taken:  , 1 tab(s) orally 2 times a day    Aspirin 81 MG Oral Tablet Delayed Release; TAKE 1 TABLET BY MOUTH EVERY DAY  Atorvastatin Calcium 10 MG Oral Tablet; TAKE 1 TABLET AT BEDTIME  Pantoprazole Sodium 40 MG Oral Tablet Delayed Release; TAKE 1 TABLET BY MOUTH  EVERY DAY  Stiolto Respimat 2.5-2.5 MCG/ACT Inhalation Aerosol Solution; INHALE 2 PUFFS Daily  Vitamin D TABS  Zofran 4 MG Oral Tablet; 1 tab every 6 hours as needed    SOCIAL HISTORY  Smoking History: quit smoking > 15 years ago      MEDICATIONS  (STANDING):  albuterol/ipratropium for Nebulization 3 milliLiter(s) Nebulizer every 6 hours  amLODIPine   Tablet 2.5 milliGRAM(s) Oral daily  apixaban 2.5 milliGRAM(s) Oral every 12 hours  aspirin enteric coated 81 milliGRAM(s) Oral daily  atorvastatin 10 milliGRAM(s) Oral at bedtime  cefTRIAXone   IVPB 1000 milliGRAM(s) IV Intermittent every 24 hours  ferrous    sulfate 325 milliGRAM(s) Oral daily  tiotropium 18 MICROgram(s) Capsule 1 Capsule(s) Inhalation daily    MEDICATIONS  (PRN):  acetaminophen   Tablet .. 650 milliGRAM(s) Oral every 6 hours PRN Temp greater or equal to 38C (100.4F), Mild Pain (1 - 3), Moderate Pain (4 - 6)  ondansetron    Tablet 4 milliGRAM(s) Oral every 8 hours PRN Nausea and/or Vomiting    REVIEW OF SYSTEMS:    CONSTITUTIONAL:  No fevers, chills, sweats    HEENT:  Eyes:  No diplopia or blurred vision. ENT:  No earache, sore throat or runny nose.    CARDIOVASCULAR:  No pressure, squeezing, tightness, or heaviness about the chest; no palpitations.    RESPIRATORY:  CTA bi lat no rhonchi, wheezing, crackles no respiratory distress      GASTROINTESTINAL:  No abdominal pain, nausea, vomiting or diarrhea.    GENITOURINARY:  No dysuria, frequency or urgency.    NEUROLOGIC:  grossly intact memory is poor                                      8.9    7.46  )-----------( 405      ( 17 May 2020 05:51 )             30.9   05-17    139  |  100  |  19  ----------------------------<  92  4.2   |  26  |  1.23    Ca    8.9      17 May 2020 05:51  Phos  4.2     05-16  Mg     2.0     05-16    TPro  7.3  /  Alb  3.9  /  TBili  0.8  /  DBili  x   /  AST  20  /  ALT  9<L>  /  AlkPhos  80  05-16    Vital Signs Last 24 Hrs  T(C): 36.6 (18 May 2020 04:52), Max: 37.1 (17 May 2020 20:38)  T(F): 97.8 (18 May 2020 04:52), Max: 98.7 (17 May 2020 20:38)  HR: 109 (18 May 2020 04:52) (96 - 109)  BP: 140/58 (18 May 2020 04:52) (105/63 - 140/58)  BP(mean): --  RR: 18 (18 May 2020 04:52) (18 - 19)  SpO2: 100% (18 May 2020 04:52) (91% - 100%)    PHYSICAL EXAMINATION:    GENERAL: The patient is a well-developed, well-nourished female in no apparent distress.     HEENT: Head is normocephalic and atraumatic. Extraocular muscles are intact. Mucous membranes are moist.     NECK: Supple.     LUNGS: Clear to auscultation without wheezing, rales, or rhonchi. Respirations unlabored    HEART: Regular rate and rhythm without murmur.    ABDOMEN: Soft, nontender, and nondistended.  No hepatosplenomegaly is noted.    EXTREMITIES: Without any cyanosis, clubbing, rash, lesions or edema.    NEUROLOGIC: Grossly intact              RADIOLOGY & ADDITIONAL STUDIES:  < from: CT Angio Chest w/ IV Cont (05.16.20 @ 03:58) >    EXAM:  CT ANGIO CHEST (W)AW IC                            PROCEDURE DATE:  05/16/2020            INTERPRETATION:  CLINICAL INFORMATION: Tachycardia with lower extremity edema.    COMPARISON: Chest CT 5/21/2019    PROCEDURE:   CT Angiography of the Chest.  90 ml of Omnipaque 350 was injected intravenously. 10 ml were discarded.  Sagittal and coronal reformats were performed as well as 3D (MIP) reconstructions.      FINDINGS:    LUNGS AND AIRWAYS: Patent central airways.  Biapical scarring. Emphysema.     PLEURA: No pleural effusion.    MEDIASTINUM AND TARA: No lymphadenopathy.    VESSELS: No pulmonary emboli. The ascending aorta measures 4.3 cm and descending aorta maximally measuring 3.9 cm unchanged at the same level compared to prior from5/21/2019. Calcified and noncalcified aortic plaque. Ulcerated plaque is identified in the descending thoracic aorta.    HEART: Heart size is normal. No pericardial effusion.    CHEST WALL AND LOWER NECK: Within normal limits.    VISUALIZED UPPER ABDOMEN: Status post cholecystectomy.    BONES: Spondylosis. Multiple chronic right-sided rib fractures.    IMPRESSION:     No pulmonary emboli.                        ASHWINI REYNAGA M.D., RADIOLOGY RESIDENT  This document has been electronically signed.  DOUG GARCIA M.D., ATTENDING RADIOLOGIST  This document has been electronically signed. May 16 2020  8:34AM        < end of copied text >    ECHO:  none    ASSESSMENT:  78 yo female with multiple medical problems admitted for dyspnea found to have UTI     PLAN:/REC   likely  not  COPD  exacerbation   continue nebs PRN  - continue  room air pulse ox > 92%  no radiographic or clinical findings suggestive of COVID and PCR NEG   f/u TTE  continue NOAC  continue Spiriva   UTI tx per medicine   -F/U DR Malhotra post DC in one to two weeks via [phone]        Thank you for allowing me to participate in the care of this patient.  Please feel free to call me for any questions/concerns.    Jose Landin NP   [facility]  [phone] office  Cell

## 2020-05-18 NOTE — PROGRESS NOTE ADULT - ASSESSMENT
80 yo woman with PMH of HTN, HLD, Afib on Eliquis, COPD on 2L of O2 at home (not compliant with continuous use), CVA (no reported deficits) hx of carotid stenosis s/p Right CEA, thoracic, abdominal aortic aneurysm presents from home in setting of 1 day of shortness of breath. Patient states that she was at rest when onset of symptoms occurred. Denies associated CP, palpitations, orthopnea. + Nausea    NÉSTOR vs. CKD III  Baseline serum Cr 1.1 in 2/2020  Scr now slightly elevated on admission 1.33   NÉTSOR in the setting of poor oral intake vs. infection  s/p CT with contrast on 5/16  Scr stable   UA with UTI. urine culture -- + GNR   Monitor BMP and urine output   Check renal sonogram if possible to follow up undetermined lesion on CT abdomen done 8/2019   Avoid nephrotoxins     HTN  BP controlled on current meds   low salt diet     Anemia  Hb low  Pt with iron deficiency  continue oral iron

## 2020-05-18 NOTE — PROGRESS NOTE ADULT - SUBJECTIVE AND OBJECTIVE BOX
79y old  Female who presents with a chief complaint of shortness of breath (18 May 2020 12:59)      Interval history:  afebrile, denies dysuria, worried about her leg swelling.       Allergies:   No Known Allergies      Antimicrobials:  cefTRIAXone   IVPB 1000 milliGRAM(s) IV Intermittent every 24 hours      REVIEW OF SYSTEMS:  No chest pain   baseline cough,  No abdominal pain      Vital Signs Last 24 Hrs  T(C): 36.6 (05-18-20 @ 12:26), Max: 37.1 (05-17-20 @ 20:38)  T(F): 97.9 (05-18-20 @ 12:26), Max: 98.7 (05-17-20 @ 20:38)  HR: 111 (05-18-20 @ 14:13) (96 - 111)  BP: 117/58 (05-18-20 @ 14:13) (105/63 - 140/58)  BP(mean): --  RR: 18 (05-18-20 @ 13:42) (18 - 19)  SpO2: 91% (05-18-20 @ 14:13) (91% - 100%)      PHYSICAL EXAM:  Patient in no acute distress. Alert, awake, siting in recliner   Cardiovascular: S1S2 normal, tachy.   Lungs: + air entry B/L lung fields.  Gastrointestinal: soft, nontender, nondistended.  Extremities: + edema.  IV sites not inflamed.                           8.9    7.46  )-----------( 405      ( 17 May 2020 05:51 )             30.9   05-17    139  |  100  |  19  ----------------------------<  92  4.2   |  26  |  1.23    Ca    8.9      17 May 2020 05:51      Culture - Urine (collected 17 May 2020 02:15)  Source: .Urine Clean Catch (Midstream)  Final Report (18 May 2020 17:13):    >100,000 CFU/ml Escherichia coli  Organism: Escherichia coli (18 May 2020 17:13)  Organism: Escherichia coli (18 May 2020 17:13)      Radiology:  < from: US Kidney and Bladder (05.18.20 @ 14:45) >  IMPRESSION:     No hydronephrosis.    The indeterminant upper pole lesion seen on CT examination at the right kidney 8/7/2018 represents a 1.6 cm cyst.    There is a 1.3 cm left renal cyst.        < from: Transthoracic Echocardiogram (05.18.20 @ 08:50) >  Conclusions:  1. Basal septal hypertrophy.  2. Normal left ventricular systolic function.  3. Normal right ventricular size and function.  *** No previous Echo exam.

## 2020-05-18 NOTE — PROGRESS NOTE ADULT - SUBJECTIVE AND OBJECTIVE BOX
SUBJECTIVE / OVERNIGHT EVENTS: pt denies shortness of breath     MEDICATIONS  (STANDING):  albuterol/ipratropium for Nebulization 3 milliLiter(s) Nebulizer every 6 hours  amLODIPine   Tablet 2.5 milliGRAM(s) Oral daily  apixaban 2.5 milliGRAM(s) Oral every 12 hours  aspirin enteric coated 81 milliGRAM(s) Oral daily  atorvastatin 10 milliGRAM(s) Oral at bedtime  cefTRIAXone   IVPB 1000 milliGRAM(s) IV Intermittent every 24 hours  ferrous    sulfate 325 milliGRAM(s) Oral daily  tiotropium 18 MICROgram(s) Capsule 1 Capsule(s) Inhalation daily    MEDICATIONS  (PRN):  acetaminophen   Tablet .. 650 milliGRAM(s) Oral every 6 hours PRN Temp greater or equal to 38C (100.4F), Mild Pain (1 - 3), Moderate Pain (4 - 6)  ondansetron    Tablet 4 milliGRAM(s) Oral every 8 hours PRN Nausea and/or Vomiting    Vital Signs Last 24 Hrs  T(C): 36.3 (18 May 2020 20:50), Max: 36.6 (18 May 2020 00:40)  T(F): 97.4 (18 May 2020 20:50), Max: 97.9 (18 May 2020 12:26)  HR: 93 (18 May 2020 20:50) (93 - 111)  BP: 115/60 (18 May 2020 20:50) (105/63 - 140/58)  BP(mean): --  RR: 17 (18 May 2020 20:50) (17 - 18)  SpO2: 91% (18 May 2020 20:50) (91% - 100%)    Constitutional: No fever, fatigue  Skin: No rash.  Eyes: No recent vision problems or eye pain.  ENT: No congestion, ear pain, or sore throat.  Cardiovascular: No chest pain or palpation.  Respiratory: No cough, shortness of breath, congestion, or wheezing.  Gastrointestinal: No abdominal pain, nausea, vomiting, or diarrhea.  Genitourinary: No dysuria.  Musculoskeletal: No joint swelling.  Neurologic: No headache.    PHYSICAL EXAM:  GENERAL: NAD  EYES: EOMI, PERRLA  NECK: Supple, No JVD  CHEST/LUNG: dec breath sounds at bases   HEART:  S1 , S2 +  ABDOMEN: soft , bs+  EXTREMITIES: edema+   NEUROLOGY:alert awake      LABS:  05-17    139  |  100  |  19  ----------------------------<  92  4.2   |  26  |  1.23    Ca    8.9      17 May 2020 05:51      Creatinine Trend: 1.23 <--, 1.25 <--, 1.33 <--                        8.9    7.46  )-----------( 405      ( 17 May 2020 05:51 )             30.9     Urine Studies:  Urinalysis Basic - ( 16 May 2020 14:55 )    Color: Light Yellow / Appearance: Clear / SG: >1.050 / pH:   Gluc:  / Ketone: Negative  / Bili: Negative / Urobili: Negative   Blood:  / Protein: Trace / Nitrite: Positive   Leuk Esterase: Large / RBC: 5 /hpf /  /HPF   Sq Epi:  / Non Sq Epi: 1 /hpf / Bacteria: Many                    Color: Light Yellow / Appearance: Clear / SG: >1.050 / pH: x  Gluc: x / Ketone: Negative  / Bili: Negative / Urobili: Negative   Blood: x / Protein: Trace / Nitrite: Positive   Leuk Esterase: Large / RBC: 5 /hpf /  /HPF   Sq Epi: x / Non Sq Epi: 1 /hpf / Bacteria: Many        RADIOLOGY & ADDITIONAL TESTS:    Imaging Personally Reviewed:    Consultant(s) Notes Reviewed:      Care Discussed with Consultants/Other Providers:

## 2020-05-19 ENCOUNTER — TRANSCRIPTION ENCOUNTER (OUTPATIENT)
Age: 80
End: 2020-05-19

## 2020-05-19 LAB
ANION GAP SERPL CALC-SCNC: 13 MMOL/L — SIGNIFICANT CHANGE UP (ref 5–17)
BUN SERPL-MCNC: 18 MG/DL — SIGNIFICANT CHANGE UP (ref 7–23)
CALCIUM SERPL-MCNC: 9.4 MG/DL — SIGNIFICANT CHANGE UP (ref 8.4–10.5)
CHLORIDE SERPL-SCNC: 101 MMOL/L — SIGNIFICANT CHANGE UP (ref 96–108)
CO2 SERPL-SCNC: 29 MMOL/L — SIGNIFICANT CHANGE UP (ref 22–31)
CREAT SERPL-MCNC: 1.18 MG/DL — SIGNIFICANT CHANGE UP (ref 0.5–1.3)
GLUCOSE SERPL-MCNC: 103 MG/DL — HIGH (ref 70–99)
HCT VFR BLD CALC: 32.6 % — LOW (ref 34.5–45)
HGB BLD-MCNC: 9.3 G/DL — LOW (ref 11.5–15.5)
MCHC RBC-ENTMCNC: 23.4 PG — LOW (ref 27–34)
MCHC RBC-ENTMCNC: 28.5 GM/DL — LOW (ref 32–36)
MCV RBC AUTO: 82.1 FL — SIGNIFICANT CHANGE UP (ref 80–100)
NRBC # BLD: 0 /100 WBCS — SIGNIFICANT CHANGE UP (ref 0–0)
PLATELET # BLD AUTO: 386 K/UL — SIGNIFICANT CHANGE UP (ref 150–400)
POTASSIUM SERPL-MCNC: 4 MMOL/L — SIGNIFICANT CHANGE UP (ref 3.5–5.3)
POTASSIUM SERPL-SCNC: 4 MMOL/L — SIGNIFICANT CHANGE UP (ref 3.5–5.3)
RBC # BLD: 3.97 M/UL — SIGNIFICANT CHANGE UP (ref 3.8–5.2)
RBC # FLD: 15 % — HIGH (ref 10.3–14.5)
SODIUM SERPL-SCNC: 143 MMOL/L — SIGNIFICANT CHANGE UP (ref 135–145)
WBC # BLD: 7.21 K/UL — SIGNIFICANT CHANGE UP (ref 3.8–10.5)
WBC # FLD AUTO: 7.21 K/UL — SIGNIFICANT CHANGE UP (ref 3.8–10.5)

## 2020-05-19 PROCEDURE — 99232 SBSQ HOSP IP/OBS MODERATE 35: CPT

## 2020-05-19 PROCEDURE — 99223 1ST HOSP IP/OBS HIGH 75: CPT

## 2020-05-19 RX ADMIN — APIXABAN 2.5 MILLIGRAM(S): 2.5 TABLET, FILM COATED ORAL at 17:28

## 2020-05-19 RX ADMIN — TIOTROPIUM BROMIDE 1 CAPSULE(S): 18 CAPSULE ORAL; RESPIRATORY (INHALATION) at 12:13

## 2020-05-19 RX ADMIN — AMLODIPINE BESYLATE 2.5 MILLIGRAM(S): 2.5 TABLET ORAL at 06:11

## 2020-05-19 RX ADMIN — ONDANSETRON 4 MILLIGRAM(S): 8 TABLET, FILM COATED ORAL at 09:22

## 2020-05-19 RX ADMIN — Medication 81 MILLIGRAM(S): at 11:41

## 2020-05-19 RX ADMIN — Medication 325 MILLIGRAM(S): at 11:41

## 2020-05-19 RX ADMIN — CEFTRIAXONE 100 MILLIGRAM(S): 500 INJECTION, POWDER, FOR SOLUTION INTRAMUSCULAR; INTRAVENOUS at 20:17

## 2020-05-19 RX ADMIN — Medication 3 MILLILITER(S): at 06:12

## 2020-05-19 RX ADMIN — APIXABAN 2.5 MILLIGRAM(S): 2.5 TABLET, FILM COATED ORAL at 06:12

## 2020-05-19 RX ADMIN — ATORVASTATIN CALCIUM 10 MILLIGRAM(S): 80 TABLET, FILM COATED ORAL at 20:16

## 2020-05-19 RX ADMIN — Medication 3 MILLILITER(S): at 17:28

## 2020-05-19 RX ADMIN — Medication 3 MILLILITER(S): at 11:41

## 2020-05-19 NOTE — CHART NOTE - NSCHARTNOTEFT_GEN_A_CORE
Chart reviewed.  hsTnT downtrending.  TTE performed and with normal LV systolic function without focal abnormality noted  SR/ST on monitor  Documented history of AF already on anticoagulation  Will d/c tele at this time

## 2020-05-19 NOTE — DISCHARGE NOTE PROVIDER - CARE PROVIDER_API CALL
Reba Geronimo  INTERNAL MEDICINE  [address] [facility] [address]  [place] [address]  Phone: [phone]  Fax: [phone]  Follow Up Time: 1 week

## 2020-05-19 NOTE — DISCHARGE NOTE PROVIDER - NSDCMRMEDTOKEN_GEN_ALL_CORE_FT
albuterol 2.5 mg/3 mL (0.083%) inhalation solution: 3 milliliter(s) by nebulizer every 8 hours, As Needed  amLODIPine 2.5 mg oral tablet: 1 tab(s) orally once a day  apixaban 2.5 mg oral tablet: 1 tab(s) orally 2 times a day  atorvastatin 10 mg oral tablet: 1 tab(s) orally once a day  Ecotrin: 81 milligram(s) orally once a day

## 2020-05-19 NOTE — PROGRESS NOTE ADULT - SUBJECTIVE AND OBJECTIVE BOX
SUBJECTIVE / OVERNIGHT EVENTS: pt denies shortness of breath     MEDICATIONS  (STANDING):  albuterol/ipratropium for Nebulization 3 milliLiter(s) Nebulizer every 6 hours  amLODIPine   Tablet 2.5 milliGRAM(s) Oral daily  apixaban 2.5 milliGRAM(s) Oral every 12 hours  aspirin enteric coated 81 milliGRAM(s) Oral daily  atorvastatin 10 milliGRAM(s) Oral at bedtime  cefTRIAXone   IVPB 1000 milliGRAM(s) IV Intermittent every 24 hours  ferrous    sulfate 325 milliGRAM(s) Oral daily  tiotropium 18 MICROgram(s) Capsule 1 Capsule(s) Inhalation daily    MEDICATIONS  (PRN):  acetaminophen   Tablet .. 650 milliGRAM(s) Oral every 6 hours PRN Temp greater or equal to 38C (100.4F), Mild Pain (1 - 3), Moderate Pain (4 - 6)  ondansetron    Tablet 4 milliGRAM(s) Oral every 8 hours PRN Nausea and/or Vomiting    Vital Signs Last 24 Hrs  T(C): 36.8 (19 May 2020 20:25), Max: 36.8 (19 May 2020 12:50)  T(F): 98.3 (19 May 2020 20:25), Max: 98.3 (19 May 2020 20:25)  HR: 98 (19 May 2020 20:25) (95 - 118)  BP: 112/64 (19 May 2020 20:25) (107/57 - 152/75)  BP(mean): --  RR: 17 (19 May 2020 20:25) (17 - 18)  SpO2: 97% (19 May 2020 20:25) (97% - 99%)    Constitutional: No fever, fatigue  Skin: No rash.  Eyes: No recent vision problems or eye pain.  ENT: No congestion, ear pain, or sore throat.  Cardiovascular: No chest pain or palpation.  Respiratory: No cough, shortness of breath, congestion, or wheezing.  Gastrointestinal: No abdominal pain, nausea, vomiting, or diarrhea.  Genitourinary: No dysuria.  Musculoskeletal: No joint swelling.  Neurologic: No headache.    PHYSICAL EXAM:  GENERAL: NAD  EYES: EOMI, PERRLA  NECK: Supple, No JVD  CHEST/LUNG: dec breath sounds at bases   HEART:  S1 , S2 +  ABDOMEN: soft , bs+  EXTREMITIES: edema+   NEUROLOGY:alert awake    LABS:  05-19    143  |  101  |  18  ----------------------------<  103<H>  4.0   |  29  |  1.18    Ca    9.4      19 May 2020 09:27      Creatinine Trend: 1.18 <--, 1.23 <--, 1.25 <--, 1.33 <--                        9.3    7.21  )-----------( 386      ( 19 May 2020 09:26 )             32.6     Urine Studies:  Urinalysis Basic - ( 16 May 2020 14:55 )    Color: Light Yellow / Appearance: Clear / SG: >1.050 / pH:   Gluc:  / Ketone: Negative  / Bili: Negative / Urobili: Negative   Blood:  / Protein: Trace / Nitrite: Positive   Leuk Esterase: Large / RBC: 5 /hpf /  /HPF   Sq Epi:  / Non Sq Epi: 1 /hpf / Bacteria: Many

## 2020-05-19 NOTE — DISCHARGE NOTE PROVIDER - NSFOLLOWUPCLINICS_GEN_ALL_ED_FT
[facility]  Gastroenterology  [address] [place] [id number]  Phone: [phone]  Fax:   Follow Up Time:

## 2020-05-19 NOTE — DISCHARGE NOTE PROVIDER - NSDCFUSCHEDAPPT_GEN_ALL_CORE_FT
SULMA MEI ; 06/04/2020 ; [facility] [id number] [place] SULMA MEI ; 06/04/2020 ; [facility] [id number] [place]

## 2020-05-19 NOTE — CONSULT NOTE ADULT - ASSESSMENT
80 yo woman with PMH of HTN, HLD, Afib on Eliquis, COPD on 2L of O2 at home (not compliant with continuous use), CVA (no reported deficits) hx of carotid stenosis s/p Right CEA, thoracic, abdominal aortic aneurysm presents with HERNANDEZ    - Her dyspnea is likely multifactorial and primarily associated with her underlying lung disease.   - Though she does have mild lower ext edema.   - This may be related to her use of Norvasc  - She does not appear to be in overt HF taking into account nl BNP, CT chest findings  - Trial Lasix 20mg IV x 1 and assess response.     - No sig ischemic changes on EKG  - echo with nl LV fxn  - HS trop elevated but with no dynamic change  - would rec outpt pharm stress    - pulm rx  - cont AC for elevated chads  - maintaining sr  - Monitor and replete electrolytes. Keep K>4.0 and Mg>2.0.  - Ao aneurysm appears to be mild and stable.   - Further cardiac workup will depend on clinical course.   - All other workup per primary team. Will followup.

## 2020-05-19 NOTE — CONSULT NOTE ADULT - SUBJECTIVE AND OBJECTIVE BOX
CHIEF COMPLAINT: Patient is a 79y old  Female who presents with a chief complaint of shortness of breath (19 May 2020 09:20)      HPI:  78 yo woman with PMH of HTN, HLD, Afib on Eliquis, COPD on 2L of O2 at home (not compliant with continuous use), CVA (no reported deficits) hx of carotid stenosis s/p Right CEA, thoracic, abdominal aortic aneurysm presents from home in setting of 1 day of shortness of breath. Patient states that she was at rest when onset of symptoms occurred. Denies associated CP, palpitations, orthopnea. Felt that there was a wheeze to her breathing earlier today. Typically has a chronic cough with intermittent productive sputum white in color. Denies rhinorrhea, nasal congestion, sore throat. Patient states that she intermittently utilizes a nebulizer. Denies use of inhalers. Patient also endorse intermittent dizziness. Endorses nausea in the day Denies headache of focal limb weakness. Also endorses LE edema that has been present for 1 month. Per ED charting patient was found to be hypoxic and wheezing by EMS and patient was given a nebulizer.     Per daughter, patient's COPD seems to be progressively worse. Doing things at home she get easily shortness of breath. Per daughter she appeared to more tired. LE edema per daughter seems to be going on for the past 5 days. Went to PMD had a CXR: was reported to be normal. Have not heard about any abnormalities of the labs. Per daughter reported weight loss. ~10 lb weight over 4 months. Per daughter, patient is a terrible eater. With regards to right vision loss, was temporary in light of discharge or mucus obstructing her eye field. Once eye was cleaned, vision improved. (16 May 2020 03:58)    She si still complaining of HERNANDEZ. Her lower extremity edema has not improved. Denies any chest pain,  palpitations, PND, orthopnea, near syncope, syncope,  stroke like symptoms.     EKG:   < from: 12 Lead ECG (05.16.20 @ 00:04) >  SINUS TACHYCARDIA WITH SHORT [place] WITH PREMATURE ATRIAL COMPLEXES  POSSIBLE ANTERIOR INFARCT , AGE UNDETERMINED  ABNORMAL ECG    < end of copied text >    REVIEW OF SYSTEMS:   All other review of systems are negative unless indicated above    PAST MEDICAL & SURGICAL HISTORY:  Hypertension  Cerebrovascular accident (CVA)  Former smoker  Gallstones  Hyperlipidemia  Dizziness  Stenosis of carotid artery: may 2017  COPD (chronic obstructive pulmonary disease)  H/O carotid endarterectomy      SOCIAL HISTORY:  No tobacco, ethanol, or drug abuse.    FAMILY HISTORY:  No pertinent family history in first degree relatives    No family history of acute MI or sudden cardiac death.    MEDICATIONS  (STANDING):  albuterol/ipratropium for Nebulization 3 milliLiter(s) Nebulizer every 6 hours  amLODIPine   Tablet 2.5 milliGRAM(s) Oral daily  apixaban 2.5 milliGRAM(s) Oral every 12 hours  aspirin enteric coated 81 milliGRAM(s) Oral daily  atorvastatin 10 milliGRAM(s) Oral at bedtime  cefTRIAXone   IVPB 1000 milliGRAM(s) IV Intermittent every 24 hours  ferrous    sulfate 325 milliGRAM(s) Oral daily  tiotropium 18 MICROgram(s) Capsule 1 Capsule(s) Inhalation daily    MEDICATIONS  (PRN):  acetaminophen   Tablet .. 650 milliGRAM(s) Oral every 6 hours PRN Temp greater or equal to 38C (100.4F), Mild Pain (1 - 3), Moderate Pain (4 - 6)  ondansetron    Tablet 4 milliGRAM(s) Oral every 8 hours PRN Nausea and/or Vomiting      Allergies    No Known Allergies    Intolerances        Home meds:  Home Medications:  albuterol 2.5 mg/3 mL (0.083%) inhalation solution: 3 milliliter(s) by nebulizer every 8 hours, As Needed (16 May 2020 05:33)  amLODIPine 2.5 mg oral tablet: 1 tab(s) orally once a day (16 May 2020 05:33)  apixaban 2.5 mg oral tablet: 1 tab(s) orally 2 times a day (16 May 2020 05:33)  atorvastatin 10 mg oral tablet: 1 tab(s) orally once a day (16 May 2020 05:33)  Ecotrin: 81 milligram(s) orally once a day (16 May 2020 05:33)        VITAL SIGNS:   Vital Signs Last 24 Hrs  T(C): 36.7 (19 May 2020 04:41), Max: 36.7 (19 May 2020 04:41)  T(F): 98.1 (19 May 2020 04:41), Max: 98.1 (19 May 2020 04:41)  HR: 118 (19 May 2020 09:10) (93 - 118)  BP: 118/69 (19 May 2020 09:10) (115/60 - 152/75)  BP(mean): --  RR: 18 (19 May 2020 09:10) (17 - 18)  SpO2: 98% (19 May 2020 09:10) (91% - 100%)    I&O's Summary    18 May 2020 07:01  -  19 May 2020 07:00  --------------------------------------------------------  IN: 900 mL / OUT: 600 mL / NET: 300 mL    19 May 2020 07:01  -  19 May 2020 09:41  --------------------------------------------------------  IN: 0 mL / OUT: 300 mL / NET: -300 mL        On Exam:     Constitutional: NAD, awake    HEENT: Moist Mucous Membranes, Anicteric  Pulmonary: Decreased breath sounds b/l. No rales, crackles or wheeze appreciated.   Cardiovascular: Regular, S1 and S2, No murmurs, rubs, gallops or clicks  Gastrointestinal: Bowel Sounds present, soft, nontender.   Lymph: No peripheral edema. No lymphadenopathy.  Skin: No visible rashes or ulcers.  Psych:  Mood & affect appropriate    LABS: All Labs Reviewed:                        8.9    7.46  )-----------( 405      ( 17 May 2020 05:51 )             30.9                         9.4    7.26  )-----------( 443      ( 16 May 2020 14:48 )             32.3     17 May 2020 05:51    139    |  100    |  19     ----------------------------<  92     4.2     |  26     |  1.23   16 May 2020 14:48    143    |  100    |  15     ----------------------------<  100    4.0     |  29     |  1.25     Ca    8.9        17 May 2020 05:51  Ca    9.2        16 May 2020 14:48  Phos  4.2       16 May 2020 14:48  Mg     2.0       16 May 2020 14:48            Blood Culture: Organism --  Gram Stain Blood -- Gram Stain --  Specimen Source .Urine Clean Catch (Midstream)  Culture-Blood --    Organism --  Gram Stain Blood -- Gram Stain --  Specimen Source .Blood Blood-Peripheral  Culture-Blood --    Organism Escherichia coli  Gram Stain Blood -- Gram Stain --  Specimen Source .Urine Clean Catch (Midstream)  Culture-Blood --            RADIOLOGY:    < from: CT Angio Chest w/ IV Cont (05.16.20 @ 03:58) >    EXAM:  CT ANGIO CHEST (W)AW IC                            PROCEDURE DATE:  05/16/2020            INTERPRETATION:  CLINICAL INFORMATION: Tachycardia with lower extremity edema.    COMPARISON: Chest CT 5/21/2019    PROCEDURE:   CT Angiography of the Chest.  90 ml of Omnipaque 350 was injected intravenously. 10 ml were discarded.  Sagittal and coronal reformats were performed as well as 3D (MIP) reconstructions.      FINDINGS:    LUNGS AND AIRWAYS: Patent central airways.  Biapical scarring. Emphysema.     PLEURA: No pleural effusion.    MEDIASTINUM AND TARA: No lymphadenopathy.    VESSELS: No pulmonary emboli. The ascending aorta measures 4.3 cm and descending aorta maximally measuring 3.9 cm unchanged at the same level compared to prior from5/21/2019. Calcified and noncalcified aortic plaque. Ulcerated plaque is identified in the descending thoracic aorta.    HEART: Heart size is normal. No pericardial effusion.    CHEST WALL AND LOWER NECK: Within normal limits.    VISUALIZED UPPER ABDOMEN: Status post cholecystectomy.    BONES: Spondylosis. Multiple chronic right-sided rib fractures.    IMPRESSION:     No pulmonary emboli.                        ASHWINI REYNAGA M.D., RADIOLOGY RESIDENT  This document has been electronically signed.  DOUG GARCIA M.D., ATTENDING RADIOLOGIST  This document has been electronically signed. May 16 2020  8:34AM                < end of copied text >       < from: Transthoracic Echocardiogram (05.18.20 @ 08:50) >    Patient name: SULMA MEI  YOB: 1940   Age: 79 (F)   MR#: [id number]  Study Date: 5/18/2020  Location: [address] [place], [place] [id number]: LORENA Rayo  Study quality: Technically fair  Referring Physician: Conrado Hines MD  Blood Pressure: 154/77 mmHg  Height: 170 cm  Weight: 55 kg  BSA: 1.6 m2  ------------------------------------------------------------------------  PROCEDURE: Transthoracic echocardiogram with 2-D, M-Mode  and complete spectral and color flow Doppler.  INDICATION:Edema, unspecified (R60.9)  ------------------------------------------------------------------------  Dimensions:    Normal Values:  LA:     2.3    2.0 - 4.0 cm  Ao:     2.8    2.0 - 3.8 cm  SEPTUM: 0.9    0.6 - 1.2 cm  PWT:    0.9    0.6 - 1.1 cm  LVIDd:  3.1    3.0 - 5.6 cm  LVIDs:  2.0    1.8 - 4.0 cm  Derived variables:  LVMI: 45 g/m2  RWT: 0.58  Fractional short: 35 %  EF (Visual Estimate): 65-70 %  Doppler Peak Velocity (m/sec): AoV=1.2  ------------------------------------------------------------------------  Observations:  Mitral Valve: Mitral annular calcification and calcified  mitral leaflets with normal diastolic opening. Minimal  mitral regurgitation.  Aortic Valve/Aorta: Calcified trileaflet aortic valve with  decreased opening.Peak transaortic valve gradient equals 6  mm Hg. Mild aortic regurgitation.  Peak left ventricular  outflow tract gradient equals 4 mm Hg.  Aortic Root: 2.8 cm.  LVOT diameter: 1.9 cm.  Left Atrium: Lipomatous hypertrophy of interatrial septum.  LeftVentricle: Normal left ventricular systolic function.  Basal septal hypertrophy.  Right Heart: Normal right atrium. Normal right ventricular  size and function. Normal tricuspid valve. Minimal  tricuspid regurgitation. Normal pulmonic valve.  Pericardium/Pleura: Normal pericardium with trace  pericardial effusion.  Hemodynamic: Estimated right atrial pressure is 8 mm Hg.  ------------------------------------------------------------------------  Conclusions:  1. Basal septal hypertrophy.  2. Normal left ventricular systolic function.  3. Normal right ventricular size and function.  *** No previous Echo exam.  ------------------------------------------------------------------------  Confirmed on  5/18/2020 - 14:15:27 by Catrachito Nicole M.D.  ------------------------------------------------------------------------    < end of copied text >

## 2020-05-19 NOTE — PROGRESS NOTE ADULT - SUBJECTIVE AND OBJECTIVE BOX
[facility]   MD RENÉ GARDINER MD RUORU WONG, PA    TEL:  OFFICE: [phone]  DR ZAPATA CELL: [phone]  TAINA EDMONDS CELL: [phone]  DR. CRUZ CELL: [phone]  DR. FINLEY CELL: [phone]    FROM 5 PM - 7 AM PLEASE CALL ANSWERING SERVICE: [phone]    RENAL FOLLOW UP NOTE  --------------------------------------------------------------------------------  HPI:      Pt seen and examined at bedside.   Denies SOB, chest pain     PAST HISTORY  --------------------------------------------------------------------------------  No significant changes to PMH, PSH, FHx, SHx, unless otherwise noted    ALLERGIES & MEDICATIONS  --------------------------------------------------------------------------------  Allergies    No Known Allergies    Intolerances      Standing Inpatient Medications  albuterol/ipratropium for Nebulization 3 milliLiter(s) Nebulizer every 6 hours  amLODIPine   Tablet 2.5 milliGRAM(s) Oral daily  apixaban 2.5 milliGRAM(s) Oral every 12 hours  aspirin enteric coated 81 milliGRAM(s) Oral daily  atorvastatin 10 milliGRAM(s) Oral at bedtime  cefTRIAXone   IVPB 1000 milliGRAM(s) IV Intermittent every 24 hours  ferrous    sulfate 325 milliGRAM(s) Oral daily  tiotropium 18 MICROgram(s) Capsule 1 Capsule(s) Inhalation daily    PRN Inpatient Medications  acetaminophen   Tablet .. 650 milliGRAM(s) Oral every 6 hours PRN  ondansetron    Tablet 4 milliGRAM(s) Oral every 8 hours PRN      REVIEW OF SYSTEMS  --------------------------------------------------------------------------------  General: no fever  CVS: no chest pain  RESP: no sob, no cough  ABD: no abdominal pain  : no dysuria,  MSK: + edema     VITALS/PHYSICAL EXAM  --------------------------------------------------------------------------------  T(C): 36.7 (05-19-20 @ 04:41), Max: 36.7 (05-19-20 @ 04:41)  HR: 103 (05-19-20 @ 04:41) (93 - 111)  BP: 152/75 (05-19-20 @ 04:41) (115/60 - 152/75)  RR: 18 (05-19-20 @ 04:41) (17 - 18)  SpO2: 98% (05-19-20 @ 04:41) (91% - 100%)  Wt(kg): --        05-18-20 @ 07:01  -  05-19-20 @ 07:00  --------------------------------------------------------  IN: 900 mL / OUT: 600 mL / NET: 300 mL    05-19-20 @ 07:01  -  05-19-20 @ 09:20  --------------------------------------------------------  IN: 0 mL / OUT: 300 mL / NET: -300 mL      Physical Exam:  	Gen: NAD  	HEENT: MMM  	Pulm: CTA B/L  	CV: S1S2  	Abd: Soft, +BS  	Ext: + LE edema B/L                      Neuro: Awake   	Skin: Warm and Dry    no andrew    LABS/STUDIES  --------------------------------------------------------------------------------                Creatinine Trend:  SCr 1.23 [05-17 @ 05:51]  SCr 1.25 [05-16 @ 14:48]  SCr 1.33 [05-16 @ 00:27]    Urinalysis - [05-16-20 @ 14:55]      Color Light Yellow / Appearance Clear / SG >1.050 / pH 6.0      Gluc Negative / Ketone Negative  / Bili Negative / Urobili Negative       Blood Trace / Protein Trace / Leuk Est Large / Nitrite Positive      RBC 5 /  / Hyaline 2 / Gran  / Sq Epi  / Non Sq Epi 1 / Bacteria Many      Iron 30, TIBC 385, %sat 8      [05-16-20 @ 17:17]  HbA1c 5.7      [06-03-18 @ 08:41]

## 2020-05-19 NOTE — PROGRESS NOTE ADULT - SUBJECTIVE AND OBJECTIVE BOX
SULMA MEI  MRN-[id number]    Patient is a 79y old  Female who presents with a chief complaint of shortness of breath (16 May 2020 03:58)    on 3L nasal canula  says she feels tired but otherwise ok        Prescriptions:    Ecotrin: Last Dose Taken:  , 81 milligram(s) orally once a day  · 	atorvastatin 10 mg oral tablet: Last Dose Taken:  , 1 tab(s) orally once a day  · 	albuterol 2.5 mg/3 mL (0.083%) inhalation solution: Last Dose Taken:  , 3 milliliter(s) by nebulizer every 8 hours, As Needed  · 	amLODIPine 2.5 mg oral tablet: Last Dose Taken:  , 1 tab(s) orally once a day  · 	apixaban 2.5 mg oral tablet: Last Dose Taken:  , 1 tab(s) orally 2 times a day    Aspirin 81 MG Oral Tablet Delayed Release; TAKE 1 TABLET BY MOUTH EVERY DAY  Atorvastatin Calcium 10 MG Oral Tablet; TAKE 1 TABLET AT BEDTIME  Pantoprazole Sodium 40 MG Oral Tablet Delayed Release; TAKE 1 TABLET BY MOUTH  EVERY DAY  Stiolto Respimat 2.5-2.5 MCG/ACT Inhalation Aerosol Solution; INHALE 2 PUFFS Daily  Vitamin D TABS  Zofran 4 MG Oral Tablet; 1 tab every 6 hours as needed    SOCIAL HISTORY  Smoking History: quit smoking > 15 years ago    MEDICATIONS  (STANDING):  albuterol/ipratropium for Nebulization 3 milliLiter(s) Nebulizer every 6 hours  amLODIPine   Tablet 2.5 milliGRAM(s) Oral daily  apixaban 2.5 milliGRAM(s) Oral every 12 hours  aspirin enteric coated 81 milliGRAM(s) Oral daily  atorvastatin 10 milliGRAM(s) Oral at bedtime  cefTRIAXone   IVPB 1000 milliGRAM(s) IV Intermittent every 24 hours  ferrous    sulfate 325 milliGRAM(s) Oral daily  tiotropium 18 MICROgram(s) Capsule 1 Capsule(s) Inhalation daily    MEDICATIONS  (PRN):  acetaminophen   Tablet .. 650 milliGRAM(s) Oral every 6 hours PRN Temp greater or equal to 38C (100.4F), Mild Pain (1 - 3), Moderate Pain (4 - 6)  ondansetron    Tablet 4 milliGRAM(s) Oral every 8 hours PRN Nausea and/or Vomiting        LABS/IMAGIN.3    7.21  )-----------( 386      ( 19 May 2020 09:26 )             32.6   05-    143  |  101  |  18  ----------------------------<  103<H>  4.0   |  29  |  1.18    Ca    9.4      19 May 2020 09:27        Vital Signs Last 24 Hrs  T(C): 36.7 (19 May 2020 04:41), Max: 36.7 (19 May 2020 04:41)  T(F): 98.1 (19 May 2020 04:41), Max: 98.1 (19 May 2020 04:41)  HR: 118 (19 May 2020 09:10) (93 - 118)  BP: 118/69 (19 May 2020 09:10) (115/60 - 152/75)  BP(mean): --  RR: 18 (19 May 2020 09:10) (17 - 18)  SpO2: 98% (19 May 2020 09:10) (91% - 100%)    PHYSICAL EXAMINATION:    GENERAL:NAD             RADIOLOGY & ADDITIONAL STUDIES:  < from: CT Angio Chest w/ IV Cont (20 @ 03:58) >    EXAM:  CT ANGIO CHEST (W)AW IC                            PROCEDURE DATE:  2020            INTERPRETATION:  CLINICAL INFORMATION: Tachycardia with lower extremity edema.    COMPARISON: Chest CT 2019    PROCEDURE:   CT Angiography of the Chest.  90 ml of Omnipaque 350 was injected intravenously. 10 ml were discarded.  Sagittal and coronal reformats were performed as well as 3D (MIP) reconstructions.      FINDINGS:    LUNGS AND AIRWAYS: Patent central airways.  Biapical scarring. Emphysema.     PLEURA: No pleural effusion.    MEDIASTINUM AND TARA: No lymphadenopathy.    VESSELS: No pulmonary emboli. The ascending aorta measures 4.3 cm and descending aorta maximally measuring 3.9 cm unchanged at the same level compared to prior from2019. Calcified and noncalcified aortic plaque. Ulcerated plaque is identified in the descending thoracic aorta.    HEART: Heart size is normal. No pericardial effusion.    CHEST WALL AND LOWER NECK: Within normal limits.    VISUALIZED UPPER ABDOMEN: Status post cholecystectomy.    BONES: Spondylosis. Multiple chronic right-sided rib fractures.    IMPRESSION:     No pulmonary emboli.          ASHWINI REYNAGA M.D., RADIOLOGY RESIDENT  This document has been electronically signed.  DOUG GARCIA M.D., ATTENDING RADIOLOGIST  This document has been electronically signed. May 16 2020  8:34AM        < end of copied text >    ECHO:  none    ASSESSMENT:  78 yo female with multiple medical problems admitted for dyspnea found to have UTI     PLAN:/REC   wean O2, incentive yovani  continue nebs/spirivia  continue NOAC  UTI tx per medicine   F/U DR Malhotra post DC in one to two weeks via [employer]      Please feel free to call me for any questions/concerns.    Usha Bruno MD  [phone]

## 2020-05-19 NOTE — DISCHARGE NOTE PROVIDER - HOSPITAL COURSE
80 yo woman with PMH of HTN, HLD, Afib on Eliquis, COPD on 2L of O2 at home (not compliant with continuous use), CVA (no reported deficits), thoracic aorta aneurysm presents from home in setting of 1 day of shortness of breath. Her dyspnea is likely multifactorial and primarily associated with her underlying lung disease. Lower extremity edema possibly attributed to use of Norvasc. She does not appear to be in overt HF taking into account nl BNP, CT chest findings. Pt found to have UTI, started on IV ceftriaxone, to complete treatment 5/20/20. Pt evaluated by physical therapy, pt to go home with home physical therapy.

## 2020-05-19 NOTE — DISCHARGE NOTE PROVIDER - NSDCCPCAREPLAN_GEN_ALL_CORE_FT
PRINCIPAL DISCHARGE DIAGNOSIS  Diagnosis: Acute UTI  Assessment and Plan of Treatment: HOME CARE INSTRUCTIONS  f you were prescribed antibiotics, take them exactly as your caregiver instructs you. Finish the medication even if you feel better after you have only taken some of the medication.  Drink enough water and fluids to keep your urine clear or pale yellow.  Avoid caffeine, tea, and carbonated beverages. They tend to irritate your bladder.  Empty your bladder often. Avoid holding urine for long periods of time.  Empty your bladder before and after sexual intercourse.  After a bowel movement, women should cleanse from front to back. Use each tissue only once.  SEEK MEDICAL CARE IF:  You have back pain.  You develop a fever.  Your symptoms do not begin to resolve within 3 days.  SEEK IMMEDIATE MEDICAL CARE IF:  You have severe back pain or lower abdominal pain.  You develop chills.  You have nausea or vomiting.  You have continued burning or discomfort with urination.        SECONDARY DISCHARGE DIAGNOSES  Diagnosis: COPD (chronic obstructive pulmonary disease)  Assessment and Plan of Treatment: Call your Health Care provider upon arrival home to make a follow up appointment within one week.  Take all inhalers as prescribed by your Health Care Provider.  Take steroids as prescribed by your Health Care Provider.  If your cough increases infrequency and severity and/or you have shortness of breath or increased shortness of breath call your Health Care Provider.  If you develop fever, chills, night sweats, malaise, and/or change in mental status call your Health care Provider.  Nutrition is very important.  Eat small frequent meals.  Increase your activity as tolerated.  Do not stay in bed all day      Diagnosis: Afib  Assessment and Plan of Treatment: Atrial fibrillation is the most common heart rhythm problem & has the risk of stroke & heart attack  It helps if you control your blood pressure, not drink more than 1-2 alcohol drinks per day, cut down on caffeine, getting treatment for over active thyroid gland, & getting exercise  Call your doctor if you feel your heart racing or beating unusually, chest tightness or pain, lightheaded, faint, shortness of breath especially with exercise  It is important to take your heart medication as prescribed  You may be on anticoagulation which is very important to take as directed - you may need blood work to monitor drug levels      Diagnosis: HLD (hyperlipidemia)  Assessment and Plan of Treatment: Follow up with PCP for treatment goals, continue medication, have liver function testing every 3 months as anti lipid medications can cause liver irritation, eat low fat, low cholesterol meals      Diagnosis: HTN (hypertension)  Assessment and Plan of Treatment: Low salt diet  Activity as tolerated.  Take all medication as prescribed.  Follow up with your medical doctor for routine blood pressure monitoring at your next visit.  Notify your doctor if you have any of the following symptoms:   Dizziness, Lightheadedness, Blurry vision, Headache, Chest pain, Shortness of breath PRINCIPAL DISCHARGE DIAGNOSIS  Diagnosis: Acute UTI  Assessment and Plan of Treatment: HOME CARE INSTRUCTIONS  f you were prescribed antibiotics, take them exactly as your caregiver instructs you. Finish the medication even if you feel better after you have only taken some of the medication.  Drink enough water and fluids to keep your urine clear or pale yellow.  Avoid caffeine, tea, and carbonated beverages. They tend to irritate your bladder.  Empty your bladder often. Avoid holding urine for long periods of time.  Empty your bladder before and after sexual intercourse.  After a bowel movement, women should cleanse from front to back. Use each tissue only once.  SEEK MEDICAL CARE IF:  You have back pain.  You develop a fever.  Your symptoms do not begin to resolve within 3 days.  SEEK IMMEDIATE MEDICAL CARE IF:  You have severe back pain or lower abdominal pain.  You develop chills.  You have nausea or vomiting.  You have continued burning or discomfort with urination.        SECONDARY DISCHARGE DIAGNOSES  Diagnosis: Chronic nausea  Assessment and Plan of Treatment: Outpatient GI follow up    Diagnosis: COPD (chronic obstructive pulmonary disease)  Assessment and Plan of Treatment: Call your Health Care provider upon arrival home to make a follow up appointment within one week.  Take all inhalers as prescribed by your Health Care Provider.  Take steroids as prescribed by your Health Care Provider.  If your cough increases infrequency and severity and/or you have shortness of breath or increased shortness of breath call your Health Care Provider.  If you develop fever, chills, night sweats, malaise, and/or change in mental status call your Health care Provider.  Nutrition is very important.  Eat small frequent meals.  Increase your activity as tolerated.  Do not stay in bed all day      Diagnosis: Afib  Assessment and Plan of Treatment: Atrial fibrillation is the most common heart rhythm problem & has the risk of stroke & heart attack  It helps if you control your blood pressure, not drink more than 1-2 alcohol drinks per day, cut down on caffeine, getting treatment for over active thyroid gland, & getting exercise  Call your doctor if you feel your heart racing or beating unusually, chest tightness or pain, lightheaded, faint, shortness of breath especially with exercise  It is important to take your heart medication as prescribed  You may be on anticoagulation which is very important to take as directed - you may need blood work to monitor drug levels      Diagnosis: HLD (hyperlipidemia)  Assessment and Plan of Treatment: Follow up with PCP for treatment goals, continue medication, have liver function testing every 3 months as anti lipid medications can cause liver irritation, eat low fat, low cholesterol meals      Diagnosis: HTN (hypertension)  Assessment and Plan of Treatment: Low salt diet  Activity as tolerated.  Take all medication as prescribed.  Follow up with your medical doctor for routine blood pressure monitoring at your next visit.  Notify your doctor if you have any of the following symptoms:   Dizziness, Lightheadedness, Blurry vision, Headache, Chest pain, Shortness of breath

## 2020-05-19 NOTE — PROGRESS NOTE ADULT - ASSESSMENT
78 yo woman with PMH of HTN, HLD, Afib on Eliquis, COPD on 2L of O2 at home (not compliant with continuous use), CVA (no reported deficits) hx of carotid stenosis s/p Right CEA, thoracic, abdominal aortic aneurysm presents from home in setting of 1 day of shortness of breath. Patient states that she was at rest when onset of symptoms occurred. Denies associated CP, palpitations, orthopnea. + Nausea    NÉSTOR vs. CKD III  Baseline serum Cr 1.1 in 2/2020  Scr now slightly elevated on admission 1.33   NÉSTOR in the setting of poor oral intake vs. infection  s/p CT with contrast on 5/16  Scr stable   UA with UTI. urine culture -- + GNR   Monitor BMP and urine output   Renal sonogram with bilateral cysts-- to follow as outpt  Avoid nephrotoxins     HTN  BP controlled on current meds   low salt diet     Anemia  Hb low  Pt with iron deficiency  continue oral iron

## 2020-05-20 ENCOUNTER — TRANSCRIPTION ENCOUNTER (OUTPATIENT)
Age: 80
End: 2020-05-20

## 2020-05-20 VITALS
HEART RATE: 84 BPM | SYSTOLIC BLOOD PRESSURE: 144 MMHG | OXYGEN SATURATION: 100 % | RESPIRATION RATE: 18 BRPM | TEMPERATURE: 98 F | DIASTOLIC BLOOD PRESSURE: 73 MMHG

## 2020-05-20 PROCEDURE — 84295 ASSAY OF SERUM SODIUM: CPT

## 2020-05-20 PROCEDURE — 97161 PT EVAL LOW COMPLEX 20 MIN: CPT

## 2020-05-20 PROCEDURE — 87186 SC STD MICRODIL/AGAR DIL: CPT

## 2020-05-20 PROCEDURE — 99232 SBSQ HOSP IP/OBS MODERATE 35: CPT

## 2020-05-20 PROCEDURE — 83735 ASSAY OF MAGNESIUM: CPT

## 2020-05-20 PROCEDURE — 71045 X-RAY EXAM CHEST 1 VIEW: CPT

## 2020-05-20 PROCEDURE — 93005 ELECTROCARDIOGRAM TRACING: CPT

## 2020-05-20 PROCEDURE — 83605 ASSAY OF LACTIC ACID: CPT

## 2020-05-20 PROCEDURE — 85027 COMPLETE CBC AUTOMATED: CPT

## 2020-05-20 PROCEDURE — 71275 CT ANGIOGRAPHY CHEST: CPT

## 2020-05-20 PROCEDURE — 82272 OCCULT BLD FECES 1-3 TESTS: CPT

## 2020-05-20 PROCEDURE — 85730 THROMBOPLASTIN TIME PARTIAL: CPT

## 2020-05-20 PROCEDURE — 82435 ASSAY OF BLOOD CHLORIDE: CPT

## 2020-05-20 PROCEDURE — 83540 ASSAY OF IRON: CPT

## 2020-05-20 PROCEDURE — 82553 CREATINE MB FRACTION: CPT

## 2020-05-20 PROCEDURE — 84100 ASSAY OF PHOSPHORUS: CPT

## 2020-05-20 PROCEDURE — 93970 EXTREMITY STUDY: CPT

## 2020-05-20 PROCEDURE — 94640 AIRWAY INHALATION TREATMENT: CPT

## 2020-05-20 PROCEDURE — 80048 BASIC METABOLIC PNL TOTAL CA: CPT

## 2020-05-20 PROCEDURE — 80053 COMPREHEN METABOLIC PANEL: CPT

## 2020-05-20 PROCEDURE — 82330 ASSAY OF CALCIUM: CPT

## 2020-05-20 PROCEDURE — 85014 HEMATOCRIT: CPT

## 2020-05-20 PROCEDURE — 87040 BLOOD CULTURE FOR BACTERIA: CPT

## 2020-05-20 PROCEDURE — 82803 BLOOD GASES ANY COMBINATION: CPT

## 2020-05-20 PROCEDURE — 84132 ASSAY OF SERUM POTASSIUM: CPT

## 2020-05-20 PROCEDURE — 84484 ASSAY OF TROPONIN QUANT: CPT

## 2020-05-20 PROCEDURE — 93306 TTE W/DOPPLER COMPLETE: CPT

## 2020-05-20 PROCEDURE — 83550 IRON BINDING TEST: CPT

## 2020-05-20 PROCEDURE — 82947 ASSAY GLUCOSE BLOOD QUANT: CPT

## 2020-05-20 PROCEDURE — 83880 ASSAY OF NATRIURETIC PEPTIDE: CPT

## 2020-05-20 PROCEDURE — 76770 US EXAM ABDO BACK WALL COMP: CPT

## 2020-05-20 PROCEDURE — 82550 ASSAY OF CK (CPK): CPT

## 2020-05-20 PROCEDURE — 85610 PROTHROMBIN TIME: CPT

## 2020-05-20 PROCEDURE — 82746 ASSAY OF FOLIC ACID SERUM: CPT

## 2020-05-20 PROCEDURE — 87086 URINE CULTURE/COLONY COUNT: CPT

## 2020-05-20 PROCEDURE — 97116 GAIT TRAINING THERAPY: CPT

## 2020-05-20 PROCEDURE — 82607 VITAMIN B-12: CPT

## 2020-05-20 PROCEDURE — 81001 URINALYSIS AUTO W/SCOPE: CPT

## 2020-05-20 PROCEDURE — 99285 EMERGENCY DEPT VISIT HI MDM: CPT

## 2020-05-20 RX ADMIN — Medication 3 MILLILITER(S): at 11:17

## 2020-05-20 RX ADMIN — Medication 3 MILLILITER(S): at 04:51

## 2020-05-20 RX ADMIN — AMLODIPINE BESYLATE 2.5 MILLIGRAM(S): 2.5 TABLET ORAL at 04:51

## 2020-05-20 RX ADMIN — TIOTROPIUM BROMIDE 1 CAPSULE(S): 18 CAPSULE ORAL; RESPIRATORY (INHALATION) at 11:17

## 2020-05-20 RX ADMIN — APIXABAN 2.5 MILLIGRAM(S): 2.5 TABLET, FILM COATED ORAL at 04:51

## 2020-05-20 RX ADMIN — Medication 325 MILLIGRAM(S): at 11:17

## 2020-05-20 RX ADMIN — CEFTRIAXONE 100 MILLIGRAM(S): 500 INJECTION, POWDER, FOR SOLUTION INTRAMUSCULAR; INTRAVENOUS at 14:06

## 2020-05-20 RX ADMIN — Medication 81 MILLIGRAM(S): at 11:17

## 2020-05-20 RX ADMIN — ONDANSETRON 4 MILLIGRAM(S): 8 TABLET, FILM COATED ORAL at 05:11

## 2020-05-20 NOTE — PROGRESS NOTE ADULT - SUBJECTIVE AND OBJECTIVE BOX
[facility] [place] [facility]   MD RENÉ GARDINER MD RUORU WONG, PA    TEL:  OFFICE: [phone]  DR ZAPATA CELL: [phone]  TAINA EDMONDS CELL: [phone]  DR. CRUZ CELL: [phone]  DR. FINLEY CELL: [phone]    FROM 5 PM - 7 AM PLEASE CALL ANSWERING SERVICE: [phone]    RENAL FOLLOW UP NOTE  --------------------------------------------------------------------------------  HPI:      Pt seen and examined at bedside.   Denies SOB, chest pain     PAST HISTORY  --------------------------------------------------------------------------------  No significant changes to PMH, PSH, FHx, SHx, unless otherwise noted    ALLERGIES & MEDICATIONS  --------------------------------------------------------------------------------  Allergies    No Known Allergies    Intolerances      Standing Inpatient Medications  albuterol/ipratropium for Nebulization 3 milliLiter(s) Nebulizer every 6 hours  amLODIPine   Tablet 2.5 milliGRAM(s) Oral daily  apixaban 2.5 milliGRAM(s) Oral every 12 hours  aspirin enteric coated 81 milliGRAM(s) Oral daily  atorvastatin 10 milliGRAM(s) Oral at bedtime  cefTRIAXone   IVPB 1000 milliGRAM(s) IV Intermittent every 24 hours  ferrous    sulfate 325 milliGRAM(s) Oral daily  tiotropium 18 MICROgram(s) Capsule 1 Capsule(s) Inhalation daily    PRN Inpatient Medications  acetaminophen   Tablet .. 650 milliGRAM(s) Oral every 6 hours PRN  ondansetron    Tablet 4 milliGRAM(s) Oral every 8 hours PRN      REVIEW OF SYSTEMS  --------------------------------------------------------------------------------  General: no fever  CVS: no chest pain  RESP: no sob, no cough  ABD: no abdominal pain  : no dysuria,  MSK: no edema     VITALS/PHYSICAL EXAM  --------------------------------------------------------------------------------  T(C): 36.7 (05-20-20 @ 04:38), Max: 36.8 (05-19-20 @ 12:50)  HR: 100 (05-20-20 @ 04:38) (95 - 100)  BP: 130/76 (05-20-20 @ 04:38) (112/64 - 130/76)  RR: 20 (05-20-20 @ 04:38) (17 - 20)  SpO2: 97% (05-20-20 @ 04:38) (97% - 99%)  Wt(kg): --        05-19-20 @ 07:01  -  05-20-20 @ 07:00  --------------------------------------------------------  IN: 420 mL / OUT: 1425 mL / NET: -1005 mL    05-20-20 @ 07:01  -  05-20-20 @ 12:07  --------------------------------------------------------  IN: 240 mL / OUT: 201 mL / NET: 39 mL      Physical Exam:  	Gen: NAD  	HEENT: MMM  	Pulm: CTA B/L  	CV: S1S2  	Abd: Soft, +BS  	Ext: No LE edema B/L                      Neuro: Awake   	Skin: Warm and Dry   	 no andrew    LABS/STUDIES  --------------------------------------------------------------------------------              9.3    7.21  >-----------<  386      [05-19-20 @ 09:26]              32.6     143  |  101  |  18  ----------------------------<  103      [05-19-20 @ 09:27]  4.0   |  29  |  1.18        Ca     9.4     [05-19-20 @ 09:27]            Creatinine Trend:  SCr 1.18 [05-19 @ 09:27]  SCr 1.23 [05-17 @ 05:51]  SCr 1.25 [05-16 @ 14:48]  SCr 1.33 [05-16 @ 00:27]    Urinalysis - [05-16-20 @ 14:55]      Color Light Yellow / Appearance Clear / SG >1.050 / pH 6.0      Gluc Negative / Ketone Negative  / Bili Negative / Urobili Negative       Blood Trace / Protein Trace / Leuk Est Large / Nitrite Positive      RBC 5 /  / Hyaline 2 / Gran  / Sq Epi  / Non Sq Epi 1 / Bacteria Many      Iron 30, TIBC 385, %sat 8      [05-16-20 @ 17:17]  HbA1c 5.7      [06-03-18 @ 08:41]

## 2020-05-20 NOTE — DISCHARGE NOTE NURSING/CASE MANAGEMENT/SOCIAL WORK - PATIENT PORTAL LINK FT
You can access the FollowMyHealth Patient Portal offered by [facility] by registering at the following website: [email or web address]. By joining [employer]’s FollowMyHealth portal, you will also be able to view your health information using other applications (apps) compatible with our system.

## 2020-05-20 NOTE — PROGRESS NOTE ADULT - ASSESSMENT
80 yo woman with PMH of HTN, HLD, Afib on Eliquis, COPD on 2L of O2 at home (not compliant with continuous use), CVA (no reported deficits) hx of carotid stenosis s/p Right CEA, thoracic, abdominal aortic aneurysm presents with HERNANDEZ    - Her dyspnea is likely multifactorial and primarily associated with her underlying lung disease.   - Though she does have mild lower ext edema.   - This may be related to her use of Norvasc  - She does not appear to be in overt HF taking into account nl BNP, CT chest findings  - I would defer IV Lasix today    - No sig ischemic changes on EKG  - echo with nl LV fxn  - HS trop elevated but with no dynamic change  - would rec outpt pharm stress    - pulm rx  - cont AC for elevated chads  - maintaining sr  - Monitor and replete electrolytes. Keep K>4.0 and Mg>2.0.  - Ao aneurysm appears to be mild and stable.   - Further cardiac workup will depend on clinical course.   - All other workup per primary team. Will followup.

## 2020-05-20 NOTE — PROGRESS NOTE ADULT - SUBJECTIVE AND OBJECTIVE BOX
[facility] - Thais Villegas, Howard, Lily, Brett, Nancy Tam  Office Number:  [phone]    Patient resting comfortably in bed in NAD.  Laying flat with no respiratory distress.  No complaints of chest pain, dyspnea, palpitations, PND, or orthopnea.  No overnight events.    F/U for:  AF    Telemetry:  OFF    MEDICATIONS  (STANDING):  albuterol/ipratropium for Nebulization 3 milliLiter(s) Nebulizer every 6 hours  amLODIPine   Tablet 2.5 milliGRAM(s) Oral daily  apixaban 2.5 milliGRAM(s) Oral every 12 hours  aspirin enteric coated 81 milliGRAM(s) Oral daily  atorvastatin 10 milliGRAM(s) Oral at bedtime  cefTRIAXone   IVPB 1000 milliGRAM(s) IV Intermittent every 24 hours  ferrous    sulfate 325 milliGRAM(s) Oral daily  tiotropium 18 MICROgram(s) Capsule 1 Capsule(s) Inhalation daily    MEDICATIONS  (PRN):  acetaminophen   Tablet .. 650 milliGRAM(s) Oral every 6 hours PRN Temp greater or equal to 38C (100.4F), Mild Pain (1 - 3), Moderate Pain (4 - 6)  ondansetron    Tablet 4 milliGRAM(s) Oral every 8 hours PRN Nausea and/or Vomiting      Allergies    No Known Allergies    Intolerances        Vital Signs Last 24 Hrs  T(C): 36.7 (20 May 2020 04:38), Max: 36.8 (19 May 2020 12:50)  T(F): 98 (20 May 2020 04:38), Max: 98.3 (19 May 2020 20:25)  HR: 100 (20 May 2020 04:38) (95 - 118)  BP: 130/76 (20 May 2020 04:38) (107/57 - 130/76)  BP(mean): --  RR: 20 (20 May 2020 04:38) (17 - 20)  SpO2: 97% (20 May 2020 04:38) (97% - 99%)    I&O's Summary    19 May 2020 07:01  -  20 May 2020 07:00  --------------------------------------------------------  IN: 420 mL / OUT: 1425 mL / NET: -1005 mL        ON EXAM:    Constitutional: NAD, awake    HEENT: Moist Mucous Membranes, Anicteric  Pulmonary: Decreased breath sounds b/l. No rales, crackles or wheeze appreciated.   Cardiovascular: Regular, S1 and S2, No murmurs, rubs, gallops or clicks  Gastrointestinal: Bowel Sounds present, soft, nontender.   Lymph: No peripheral edema. No lymphadenopathy.  Skin: No visible rashes or ulcers.  Psych:  Mood & affect appropriate    LABS: All Labs Reviewed:                        9.3    7.21  )-----------( 386      ( 19 May 2020 09:26 )             32.6     19 May 2020 09:27    143    |  101    |  18     ----------------------------<  103    4.0     |  29     |  1.18     Ca    9.4        19 May 2020 09:27            Blood Culture: Organism --  Gram Stain Blood -- Gram Stain --  Specimen Source .Urine Clean Catch (Midstream)  Culture-Blood --    Organism --  Gram Stain Blood -- Gram Stain --  Specimen Source .Blood Blood-Peripheral  Culture-Blood --    Organism Escherichia coli  Gram Stain Blood -- Gram Stain --  Specimen Source .Urine Clean Catch (Midstream)  Culture-Blood --

## 2020-05-20 NOTE — PROGRESS NOTE ADULT - ASSESSMENT
80 yo woman with PMH of HTN, HLD, Afib on Eliquis, COPD on 2L of O2 at home (not compliant with continuous use), CVA (no reported deficits) hx of carotid stenosis s/p Right CEA, thoracic, abdominal aortic aneurysm presents from home in setting of 1 day of shortness of breath. Patient states that she was at rest when onset of symptoms occurred. Denies associated CP, palpitations, orthopnea. + Nausea    NÉSTOR vs. CKD III  Baseline serum Cr 1.1 in 2/2020  Scr nlevated on admission 1.33   NÉSTOR in the setting of poor oral intake vs. infection  s/p CT with contrast on 5/16  Scr stable   UA with UTI. urine culture -- + GNR   Monitor BMP and urine output   Renal sonogram with bilateral cysts-- to follow as outpt  Avoid nephrotoxins     HTN  BP controlled on current meds   low salt diet     Anemia  Hb improving   Pt with iron deficiency  continue oral iron

## 2020-05-22 LAB
CULTURE RESULTS: SIGNIFICANT CHANGE UP
CULTURE RESULTS: SIGNIFICANT CHANGE UP
SPECIMEN SOURCE: SIGNIFICANT CHANGE UP
SPECIMEN SOURCE: SIGNIFICANT CHANGE UP

## 2020-05-27 ENCOUNTER — APPOINTMENT (OUTPATIENT)
Dept: ULTRASOUND IMAGING | Facility: IMAGING CENTER | Age: 80
End: 2020-05-27

## 2020-05-27 ENCOUNTER — OUTPATIENT (OUTPATIENT)
Dept: OUTPATIENT SERVICES | Facility: HOSPITAL | Age: 80
LOS: 1 days | End: 2020-05-27
Payer: MEDICARE

## 2020-05-27 ENCOUNTER — APPOINTMENT (OUTPATIENT)
Dept: RADIOLOGY | Facility: IMAGING CENTER | Age: 80
End: 2020-05-27

## 2020-05-27 DIAGNOSIS — Z00.8 ENCOUNTER FOR OTHER GENERAL EXAMINATION: ICD-10-CM

## 2020-05-27 DIAGNOSIS — Z98.890 OTHER SPECIFIED POSTPROCEDURAL STATES: Chronic | ICD-10-CM

## 2020-05-27 PROCEDURE — 76856 US EXAM PELVIC COMPLETE: CPT

## 2020-05-27 PROCEDURE — 72070 X-RAY EXAM THORAC SPINE 2VWS: CPT

## 2020-05-27 PROCEDURE — 71100 X-RAY EXAM RIBS UNI 2 VIEWS: CPT | Mod: 26

## 2020-05-27 PROCEDURE — 76856 US EXAM PELVIC COMPLETE: CPT | Mod: 26

## 2020-05-27 PROCEDURE — 72070 X-RAY EXAM THORAC SPINE 2VWS: CPT | Mod: 26

## 2020-05-27 PROCEDURE — 71100 X-RAY EXAM RIBS UNI 2 VIEWS: CPT

## 2020-06-18 ENCOUNTER — LABORATORY RESULT (OUTPATIENT)
Age: 80
End: 2020-06-18

## 2020-06-18 ENCOUNTER — APPOINTMENT (OUTPATIENT)
Dept: PULMONOLOGY | Facility: CLINIC | Age: 80
End: 2020-06-18
Payer: MEDICARE

## 2020-06-18 VITALS
BODY MASS INDEX: 22.03 KG/M2 | WEIGHT: 112.2 LBS | HEART RATE: 93 BPM | OXYGEN SATURATION: 93 % | HEIGHT: 60 IN | TEMPERATURE: 98.4 F | SYSTOLIC BLOOD PRESSURE: 128 MMHG | DIASTOLIC BLOOD PRESSURE: 73 MMHG

## 2020-06-18 DIAGNOSIS — D64.9 ANEMIA, UNSPECIFIED: ICD-10-CM

## 2020-06-18 DIAGNOSIS — R11.0 NAUSEA: ICD-10-CM

## 2020-06-18 PROCEDURE — 99214 OFFICE O/P EST MOD 30 MIN: CPT | Mod: 25

## 2020-06-18 PROCEDURE — 36415 COLL VENOUS BLD VENIPUNCTURE: CPT

## 2020-06-18 RX ORDER — DRONABINOL 2.5 MG/1
2.5 CAPSULE ORAL TWICE DAILY
Qty: 60 | Refills: 0 | Status: DISCONTINUED | COMMUNITY
Start: 2020-02-27 | End: 2020-06-18

## 2020-06-18 RX ORDER — TIOTROPIUM BROMIDE AND OLODATEROL 3.124; 2.736 UG/1; UG/1
2.5-2.5 SPRAY, METERED RESPIRATORY (INHALATION) DAILY
Qty: 1 | Refills: 5 | Status: DISCONTINUED | OUTPATIENT
Start: 2019-01-31 | End: 2020-06-18

## 2020-06-18 RX ORDER — TIOTROPIUM BROMIDE AND OLODATEROL 3.124; 2.736 UG/1; UG/1
2.5-2.5 SPRAY, METERED RESPIRATORY (INHALATION) DAILY
Qty: 1 | Refills: 5 | Status: DISCONTINUED | OUTPATIENT
Start: 2020-02-27 | End: 2020-06-18

## 2020-06-19 DIAGNOSIS — E61.1 IRON DEFICIENCY: ICD-10-CM

## 2020-06-19 PROBLEM — D64.9 ANEMIA: Status: ACTIVE | Noted: 2020-06-19

## 2020-06-19 PROBLEM — R11.0 NAUSEA: Status: ACTIVE | Noted: 2020-02-27

## 2020-06-19 LAB
ALBUMIN SERPL ELPH-MCNC: 4.1 G/DL
ALP BLD-CCNC: 104 U/L
ALT SERPL-CCNC: 7 U/L
ANION GAP SERPL CALC-SCNC: 14 MMOL/L
AST SERPL-CCNC: 19 U/L
BASOPHILS # BLD AUTO: 0.04 K/UL
BASOPHILS NFR BLD AUTO: 0.6 %
BILIRUB SERPL-MCNC: 0.9 MG/DL
BUN SERPL-MCNC: 20 MG/DL
CALCIUM SERPL-MCNC: 9.2 MG/DL
CHLORIDE SERPL-SCNC: 102 MMOL/L
CO2 SERPL-SCNC: 28 MMOL/L
CREAT SERPL-MCNC: 1.09 MG/DL
EOSINOPHIL # BLD AUTO: 0.35 K/UL
EOSINOPHIL NFR BLD AUTO: 4.9 %
FERRITIN SERPL-MCNC: 25 NG/ML
FOLATE SERPL-MCNC: 13.4 NG/ML
GLUCOSE SERPL-MCNC: 90 MG/DL
HCT VFR BLD CALC: 32.8 %
HGB BLD-MCNC: 9.2 G/DL
IMM GRANULOCYTES NFR BLD AUTO: 0.3 %
IRON SATN MFR SERPL: 5 %
IRON SERPL-MCNC: 18 UG/DL
LYMPHOCYTES # BLD AUTO: 2.04 K/UL
LYMPHOCYTES NFR BLD AUTO: 28.4 %
MAN DIFF?: NORMAL
MCHC RBC-ENTMCNC: 23.2 PG
MCHC RBC-ENTMCNC: 28 GM/DL
MCV RBC AUTO: 82.6 FL
MONOCYTES # BLD AUTO: 0.57 K/UL
MONOCYTES NFR BLD AUTO: 7.9 %
NEUTROPHILS # BLD AUTO: 4.17 K/UL
NEUTROPHILS NFR BLD AUTO: 57.9 %
PLATELET # BLD AUTO: 388 K/UL
POTASSIUM SERPL-SCNC: 3.8 MMOL/L
PROT SERPL-MCNC: 6.8 G/DL
RBC # BLD: 3.97 M/UL
RBC # FLD: 17.1 %
SODIUM SERPL-SCNC: 144 MMOL/L
TIBC SERPL-MCNC: 374 UG/DL
UIBC SERPL-MCNC: 356 UG/DL
VIT B12 SERPL-MCNC: 404 PG/ML
WBC # FLD AUTO: 7.19 K/UL

## 2020-06-19 NOTE — HISTORY OF PRESENT ILLNESS
[Former] : former [Never] : never [TextBox_4] : History of severe COPD and chronic nausea of unclear etiology.  Admitted to hospital for fatigue nausea and shortness of breath no specific findings noted.  Had CT of chest negative for pulmonary emboli.  Presents for follow-up.  Uses oxygen at night 2 L.  Some occasional shortness of breath.  Chronic nausea.  Tried on marijuana derivative it was effective but caused unacceptable side effects.

## 2020-06-19 NOTE — ADDENDUM
[FreeTextEntry1] : Labs demonstrate iron deficiency anemia.\par According to daughter patient actually needed iron infusions in the past.\par We will try oral iron supplementation.\par Follow-up 1 month\par Arrange upper GI series-endoscopy would be very high risk

## 2020-06-19 NOTE — ASSESSMENT
[FreeTextEntry1] : Unexplained nausea does not appear to be related to COPD issues.  No evidence of significant CO2 retention may continue with oxygen.  Unexplained anemia will obtain work-up.

## 2020-06-19 NOTE — PROCEDURE
[FreeTextEntry1] : EXAM: CT ANGIO CHEST (W)AW IC \par \par \par PROCEDURE DATE: 05/16/2020 \par \par \par \par \par INTERPRETATION: CLINICAL INFORMATION: Tachycardia with lower extremity \par edema. \par \par COMPARISON: Chest CT 5/21/2019 \par \par PROCEDURE: \par CT Angiography of the Chest. \par 90 ml of Omnipaque 350 was injected intravenously. 10 ml were discarded. \par Sagittal and coronal reformats were performed as well as 3D (MIP) \par reconstructions. \par \par \par FINDINGS: \par \par LUNGS AND AIRWAYS: Patent central airways. Biapical scarring. Emphysema. \par \par PLEURA: No pleural effusion. \par \par MEDIASTINUM AND TARA: No lymphadenopathy. \par \par VESSELS: No pulmonary emboli. The ascending aorta measures 4.3 cm and \par descending aorta maximally measuring 3.9 cm unchanged at the same level \par compared to prior from 5/21/2019. Calcified and noncalcified aortic plaque. \par Ulcerated plaque is identified in the descending thoracic aorta. \par \par HEART: Heart size is normal. No pericardial effusion. \par \par CHEST WALL AND LOWER NECK: Within normal limits. \par \par VISUALIZED UPPER ABDOMEN: Status post cholecystectomy. \par \par BONES: Spondylosis. Multiple chronic right-sided rib fractures. \par \par IMPRESSION: \par \par No pulmonary embol\par \par \par EXAM: DUPLEX SCAN EXT VEINS LOWER BI \par \par \par PROCEDURE DATE: 05/18/2020 \par \par \par \par \par INTERPRETATION: CLINICAL INFORMATION: Leg swelling and pain. \par \par COMPARISON: None available. \par \par TECHNIQUE: Duplex sonography of the BILATERAL LOWER extremity veins with \par color and spectral Doppler, with and without compression. \par \par FINDINGS: \par \par There is normal compressibility of the bilateral common femoral, femoral and \par popliteal veins. \par \par Doppler examination shows normal spontaneous and phasic flow. \par \par No calf vein thrombosis is detected. \par \par IMPRESSION: \par \par No evidence of deep venous thrombosis in either lower extremity.

## 2020-07-25 ENCOUNTER — EMERGENCY (EMERGENCY)
Facility: HOSPITAL | Age: 80
LOS: 1 days | Discharge: HOME | End: 2020-07-25
Attending: EMERGENCY MEDICINE
Payer: MEDICARE

## 2020-07-25 VITALS
OXYGEN SATURATION: 98 % | DIASTOLIC BLOOD PRESSURE: 68 MMHG | TEMPERATURE: 98 F | RESPIRATION RATE: 18 BRPM | SYSTOLIC BLOOD PRESSURE: 124 MMHG | HEART RATE: 91 BPM | HEIGHT: 64 IN | WEIGHT: 100.09 LBS

## 2020-07-25 VITALS
OXYGEN SATURATION: 97 % | HEART RATE: 91 BPM | SYSTOLIC BLOOD PRESSURE: 129 MMHG | DIASTOLIC BLOOD PRESSURE: 83 MMHG | RESPIRATION RATE: 19 BRPM

## 2020-07-25 DIAGNOSIS — Z98.890 OTHER SPECIFIED POSTPROCEDURAL STATES: Chronic | ICD-10-CM

## 2020-07-25 LAB
ALBUMIN SERPL ELPH-MCNC: 3.9 G/DL — SIGNIFICANT CHANGE UP (ref 3.3–5)
ALP SERPL-CCNC: 97 U/L — SIGNIFICANT CHANGE UP (ref 40–120)
ALT FLD-CCNC: 7 U/L — LOW (ref 10–45)
ANION GAP SERPL CALC-SCNC: 11 MMOL/L — SIGNIFICANT CHANGE UP (ref 5–17)
APPEARANCE UR: CLEAR — SIGNIFICANT CHANGE UP
AST SERPL-CCNC: 19 U/L — SIGNIFICANT CHANGE UP (ref 10–40)
BASOPHILS # BLD AUTO: 0.03 K/UL — SIGNIFICANT CHANGE UP (ref 0–0.2)
BASOPHILS NFR BLD AUTO: 0.5 % — SIGNIFICANT CHANGE UP (ref 0–2)
BILIRUB SERPL-MCNC: 0.9 MG/DL — SIGNIFICANT CHANGE UP (ref 0.2–1.2)
BILIRUB UR-MCNC: NEGATIVE — SIGNIFICANT CHANGE UP
BUN SERPL-MCNC: 21 MG/DL — SIGNIFICANT CHANGE UP (ref 7–23)
CALCIUM SERPL-MCNC: 9.4 MG/DL — SIGNIFICANT CHANGE UP (ref 8.4–10.5)
CHLORIDE SERPL-SCNC: 101 MMOL/L — SIGNIFICANT CHANGE UP (ref 96–108)
CO2 SERPL-SCNC: 29 MMOL/L — SIGNIFICANT CHANGE UP (ref 22–31)
COLOR SPEC: SIGNIFICANT CHANGE UP
CREAT SERPL-MCNC: 1.12 MG/DL — SIGNIFICANT CHANGE UP (ref 0.5–1.3)
DIFF PNL FLD: NEGATIVE — SIGNIFICANT CHANGE UP
EOSINOPHIL # BLD AUTO: 0.33 K/UL — SIGNIFICANT CHANGE UP (ref 0–0.5)
EOSINOPHIL NFR BLD AUTO: 5.2 % — SIGNIFICANT CHANGE UP (ref 0–6)
GAS PNL BLDV: SIGNIFICANT CHANGE UP
GLUCOSE SERPL-MCNC: 94 MG/DL — SIGNIFICANT CHANGE UP (ref 70–99)
GLUCOSE UR QL: NEGATIVE — SIGNIFICANT CHANGE UP
HCT VFR BLD CALC: 33.9 % — LOW (ref 34.5–45)
HGB BLD-MCNC: 10.3 G/DL — LOW (ref 11.5–15.5)
IMM GRANULOCYTES NFR BLD AUTO: 0.3 % — SIGNIFICANT CHANGE UP (ref 0–1.5)
KETONES UR-MCNC: NEGATIVE — SIGNIFICANT CHANGE UP
LEUKOCYTE ESTERASE UR-ACNC: ABNORMAL
LIDOCAIN IGE QN: 37 U/L — SIGNIFICANT CHANGE UP (ref 7–60)
LYMPHOCYTES # BLD AUTO: 1.39 K/UL — SIGNIFICANT CHANGE UP (ref 1–3.3)
LYMPHOCYTES # BLD AUTO: 21.8 % — SIGNIFICANT CHANGE UP (ref 13–44)
MCHC RBC-ENTMCNC: 23.9 PG — LOW (ref 27–34)
MCHC RBC-ENTMCNC: 30.4 GM/DL — LOW (ref 32–36)
MCV RBC AUTO: 78.7 FL — LOW (ref 80–100)
MONOCYTES # BLD AUTO: 0.51 K/UL — SIGNIFICANT CHANGE UP (ref 0–0.9)
MONOCYTES NFR BLD AUTO: 8 % — SIGNIFICANT CHANGE UP (ref 2–14)
NEUTROPHILS # BLD AUTO: 4.11 K/UL — SIGNIFICANT CHANGE UP (ref 1.8–7.4)
NEUTROPHILS NFR BLD AUTO: 64.2 % — SIGNIFICANT CHANGE UP (ref 43–77)
NITRITE UR-MCNC: NEGATIVE — SIGNIFICANT CHANGE UP
NRBC # BLD: 0 /100 WBCS — SIGNIFICANT CHANGE UP (ref 0–0)
PH UR: 7.5 — SIGNIFICANT CHANGE UP (ref 5–8)
PLATELET # BLD AUTO: 362 K/UL — SIGNIFICANT CHANGE UP (ref 150–400)
POTASSIUM SERPL-MCNC: 4.2 MMOL/L — SIGNIFICANT CHANGE UP (ref 3.5–5.3)
POTASSIUM SERPL-SCNC: 4.2 MMOL/L — SIGNIFICANT CHANGE UP (ref 3.5–5.3)
PROT SERPL-MCNC: 7.4 G/DL — SIGNIFICANT CHANGE UP (ref 6–8.3)
PROT UR-MCNC: NEGATIVE — SIGNIFICANT CHANGE UP
RBC # BLD: 4.31 M/UL — SIGNIFICANT CHANGE UP (ref 3.8–5.2)
RBC # FLD: 17.3 % — HIGH (ref 10.3–14.5)
SODIUM SERPL-SCNC: 141 MMOL/L — SIGNIFICANT CHANGE UP (ref 135–145)
SP GR SPEC: 1.01 — LOW (ref 1.01–1.02)
UROBILINOGEN FLD QL: NEGATIVE — SIGNIFICANT CHANGE UP
WBC # BLD: 6.39 K/UL — SIGNIFICANT CHANGE UP (ref 3.8–10.5)
WBC # FLD AUTO: 6.39 K/UL — SIGNIFICANT CHANGE UP (ref 3.8–10.5)

## 2020-07-25 PROCEDURE — 80053 COMPREHEN METABOLIC PANEL: CPT

## 2020-07-25 PROCEDURE — 83605 ASSAY OF LACTIC ACID: CPT

## 2020-07-25 PROCEDURE — 71045 X-RAY EXAM CHEST 1 VIEW: CPT | Mod: 26

## 2020-07-25 PROCEDURE — 74177 CT ABD & PELVIS W/CONTRAST: CPT | Mod: 26

## 2020-07-25 PROCEDURE — 82947 ASSAY GLUCOSE BLOOD QUANT: CPT

## 2020-07-25 PROCEDURE — 85014 HEMATOCRIT: CPT

## 2020-07-25 PROCEDURE — 81001 URINALYSIS AUTO W/SCOPE: CPT

## 2020-07-25 PROCEDURE — 99284 EMERGENCY DEPT VISIT MOD MDM: CPT

## 2020-07-25 PROCEDURE — 84295 ASSAY OF SERUM SODIUM: CPT

## 2020-07-25 PROCEDURE — 82803 BLOOD GASES ANY COMBINATION: CPT

## 2020-07-25 PROCEDURE — 83690 ASSAY OF LIPASE: CPT

## 2020-07-25 PROCEDURE — 82330 ASSAY OF CALCIUM: CPT

## 2020-07-25 PROCEDURE — 93010 ELECTROCARDIOGRAM REPORT: CPT

## 2020-07-25 PROCEDURE — 99284 EMERGENCY DEPT VISIT MOD MDM: CPT | Mod: 25

## 2020-07-25 PROCEDURE — 74177 CT ABD & PELVIS W/CONTRAST: CPT

## 2020-07-25 PROCEDURE — 71045 X-RAY EXAM CHEST 1 VIEW: CPT

## 2020-07-25 PROCEDURE — 82435 ASSAY OF BLOOD CHLORIDE: CPT

## 2020-07-25 PROCEDURE — 93005 ELECTROCARDIOGRAM TRACING: CPT

## 2020-07-25 PROCEDURE — 96374 THER/PROPH/DIAG INJ IV PUSH: CPT | Mod: XU

## 2020-07-25 PROCEDURE — 87086 URINE CULTURE/COLONY COUNT: CPT

## 2020-07-25 PROCEDURE — 85027 COMPLETE CBC AUTOMATED: CPT

## 2020-07-25 PROCEDURE — 84132 ASSAY OF SERUM POTASSIUM: CPT

## 2020-07-25 RX ORDER — CEFUROXIME AXETIL 250 MG
1 TABLET ORAL
Qty: 14 | Refills: 0
Start: 2020-07-25 | End: 2020-07-31

## 2020-07-25 RX ORDER — ONDANSETRON 8 MG/1
4 TABLET, FILM COATED ORAL ONCE
Refills: 0 | Status: COMPLETED | OUTPATIENT
Start: 2020-07-25 | End: 2020-07-25

## 2020-07-25 RX ORDER — ONDANSETRON 8 MG/1
1 TABLET, FILM COATED ORAL
Qty: 4 | Refills: 0
Start: 2020-07-25

## 2020-07-25 RX ADMIN — ONDANSETRON 4 MILLIGRAM(S): 8 TABLET, FILM COATED ORAL at 15:47

## 2020-07-25 NOTE — ED PROVIDER NOTE - PATIENT PORTAL LINK FT
You can access the FollowMyHealth Patient Portal offered by [facility] by registering at the following website: [email or web address]. By joining [employer]’s FollowMyHealth portal, you will also be able to view your health information using other applications (apps) compatible with our system.

## 2020-07-25 NOTE — ED PROVIDER NOTE - PHYSICAL EXAMINATION
Attending MD Prieto:    Gen:  No distress, awake and alert oriented x 3  Neck: supple, no swelling, trachea midline  CV: heart with reg rhythm, no obvious murmur appreciated   Resp: CTAB, breathing comfortably  Abd: soft, moderate distention, BS present, mild diffuse ttp without rebound or guarding   Extremities: extremities warm to the touch, trace ankle edema b/l   Msk: no extremity deformities or bony tenderness  Pysch: appropriate affect    Neuro: moves all extremities spontaneously, no gross motor or sensory deficits

## 2020-07-25 NOTE — ED ADULT NURSE REASSESSMENT NOTE - NS ED NURSE REASSESS COMMENT FT1
RN and MD Fair spoke to daughter Hannah about patient d/c and transportation. Per daughter she will pick patient up "in a few hours". Pt VSS. No s/s distress. Understands and verbalizes d/c instructions. Pt to be d/c.

## 2020-07-25 NOTE — ED ADULT NURSE NOTE - OBJECTIVE STATEMENT
79 y/o F pt w/ pmh of COPD, HTN, CVA, HLD, gallstones, present to ED for nausea and weakness for weeks, on exam A&Ox4, breathing spontaneous, unlabored w/o distress on room air, abd soft, mild LUQ tenderness, ND, + BS in all 4 quadrant, neg CVA tenderness, denies V/D, fever, chills, dysuria, hematuria, melena, seen and eval by MD, heplock placed, labs drawn and sent, nausea meds given as ordered

## 2020-07-25 NOTE — ED PROVIDER NOTE - PROGRESS NOTE DETAILS
Whitney Horvath MD PGY-2 pt signed out to me. UA positive. patient endorsing recent urinary frequency/urgency over the last 2 months. will treat for UTI. culture sent. patient tolerating PO. daugher to pick her up. Discussed CT with patient and need for outpatient US of kidneys and uterus for cysts

## 2020-07-25 NOTE — ED PROVIDER NOTE - NSFOLLOWUPCLINICS_GEN_ALL_ED_FT
[facility] Gynecology and Obstetrics  Gynceology/OB  [address] [place] [address]  Phone: [phone]  Fax:   Follow Up Time: 1-3 Days    [facility] General Internal Medicine  General Internal Medicine  [address] [place] [id number]  Phone: [phone]  Fax:   Follow Up Time: 1-3 Days

## 2020-07-25 NOTE — ED PROVIDER NOTE - CLINICAL SUMMARY MEDICAL DECISION MAKING FREE TEXT BOX
Attending MD Moreau: 80F with COPD presenting with vague nausea  x months, challenging historian. Exam only notable for mild to moderate abd distention with mild diffuse ttp. Ddx broad but will r/o acute intra-abd pathology with CT a/p, screening ECG to r/o atypical ischemia, CXR to r/o PNA, anti-emetics and reassess

## 2020-07-25 NOTE — ED ADULT NURSE NOTE - NSIMPLEMENTINTERV_GEN_ALL_ED
Implemented All Fall with Harm Risk Interventions:  Latty to call system. Call bell, personal items and telephone within reach. Instruct patient to call for assistance. Room bathroom lighting operational. Non-slip footwear when patient is off stretcher. Physically safe environment: no spills, clutter or unnecessary equipment. Stretcher in lowest position, wheels locked, appropriate side rails in place. Provide visual cue, wrist band, yellow gown, etc. Monitor gait and stability. Monitor for mental status changes and reorient to person, place, and time. Review medications for side effects contributing to fall risk. Reinforce activity limits and safety measures with patient and family. Provide visual clues: red socks.

## 2020-07-25 NOTE — ED PROVIDER NOTE - NSFOLLOWUPINSTRUCTIONS_ED_ALL_ED_FT
1. You were seen in the Emergency Room for nausea. You were found to have a urinary tract infection  2. Continue to take all medications as prescribed. You may take Ceftin (Cefuroxime) 250 mg (1 tab) two times a day for 7 days. You may also take the nausea medication as prescribed.   3. Please follow up with your doctor in 24-48 hours. You need to have an ultrasound of your kidneys and your uterus for cysts. Please call the numbers below to make an appointment  4. Return to the emergency room or seek immediate assistance for any new or concerning symptoms (such as fevers, chills, abdominal pain, nausea, vomiting, back pain, confusion, worsening urinary symptoms such as pain or burning with urination, blood in urine, urinary frequency, urgency), or if you get worse.   5. Copies of your tests were provided to you for follow-up.  You must address all your findings with your doctor.

## 2020-07-25 NOTE — ED PROVIDER NOTE - ATTENDING CONTRIBUTION TO CARE
Attending MD Moreau:  I personally have seen and examined this patient.  Resident note reviewed and agree on plan of care and except where noted.  See HPI, PE, and MDM for details.

## 2020-07-25 NOTE — ED PROVIDER NOTE - OBJECTIVE STATEMENT
80F with ho COPD presenting with nausea and feeling "sick" for ?months. Patient is a very challenging historian and states she feels nausea and overall not good but duration could for months. Patient is not clear as to why she sought ED care today. No dyspnea, no chest pain, no fevers or chills, no urinary symptoms. Diarrhea "sometimes" but no melena or hematochezia.

## 2020-07-27 LAB
CULTURE RESULTS: SIGNIFICANT CHANGE UP
SPECIMEN SOURCE: SIGNIFICANT CHANGE UP

## 2020-07-28 NOTE — ED POST DISCHARGE NOTE - DETAILS
7/28/20: Unable to reach patient, left voicemail to call admin line. -Trudi Fernandez PA-C 7/28/20: Unable to reach patient, left voicemail to call admin line. Spoke with emergency contact Hannah who states that she would give her mother admin call back #. Given # for patient's daughter in law [phone], as patient is not at her current address and will not answer listed number. Called back Hannah who states that she will relay information for repeat UC, outpatient follow up and very strict return precautions to her mother. -Trudi Fernandez PA-C 7/28/20: Unable to reach patient, left voicemail to call admin line. Spoke with emergency contact Hannah who states that she would give her mother admin call back #. Given # for patient's daughter in law [phone], as patient is not at her current address and will not answer listed number. Called back Hannah who states that she will relay information for repeat UC, outpatient follow up and very strict return precautions to her mother. Was able to speak with patient's ... daughter in law, who is with patient. Results and recommendations given as well. States will make an appointment with PCP. -Trudi Fernandez PA-C

## 2020-08-02 ENCOUNTER — EMERGENCY (EMERGENCY)
Facility: HOSPITAL | Age: 80
LOS: 1 days | Discharge: HOME | End: 2020-08-02
Attending: EMERGENCY MEDICINE
Payer: MEDICARE

## 2020-08-02 VITALS
TEMPERATURE: 98 F | RESPIRATION RATE: 20 BRPM | OXYGEN SATURATION: 99 % | HEIGHT: 63 IN | HEART RATE: 93 BPM | WEIGHT: 149.91 LBS | DIASTOLIC BLOOD PRESSURE: 67 MMHG | SYSTOLIC BLOOD PRESSURE: 133 MMHG

## 2020-08-02 DIAGNOSIS — Z98.890 OTHER SPECIFIED POSTPROCEDURAL STATES: Chronic | ICD-10-CM

## 2020-08-02 LAB
ALBUMIN SERPL ELPH-MCNC: 4.2 G/DL — SIGNIFICANT CHANGE UP (ref 3.3–5.2)
ALP SERPL-CCNC: 123 U/L — HIGH (ref 40–120)
ALT FLD-CCNC: 8 U/L — SIGNIFICANT CHANGE UP
ANION GAP SERPL CALC-SCNC: 12 MMOL/L — SIGNIFICANT CHANGE UP (ref 5–17)
AST SERPL-CCNC: 19 U/L — SIGNIFICANT CHANGE UP
BASE EXCESS BLDV CALC-SCNC: 5.4 MMOL/L — HIGH (ref -2–2)
BASOPHILS # BLD AUTO: 0.03 K/UL — SIGNIFICANT CHANGE UP (ref 0–0.2)
BASOPHILS NFR BLD AUTO: 0.5 % — SIGNIFICANT CHANGE UP (ref 0–2)
BILIRUB SERPL-MCNC: 0.7 MG/DL — SIGNIFICANT CHANGE UP (ref 0.4–2)
BUN SERPL-MCNC: 22 MG/DL — HIGH (ref 8–20)
CA-I SERPL-SCNC: 1.14 MMOL/L — LOW (ref 1.15–1.33)
CALCIUM SERPL-MCNC: 9.3 MG/DL — SIGNIFICANT CHANGE UP (ref 8.6–10.2)
CHLORIDE BLDV-SCNC: 103 MMOL/L — SIGNIFICANT CHANGE UP (ref 98–107)
CHLORIDE SERPL-SCNC: 98 MMOL/L — SIGNIFICANT CHANGE UP (ref 98–107)
CO2 SERPL-SCNC: 29 MMOL/L — SIGNIFICANT CHANGE UP (ref 22–29)
CREAT SERPL-MCNC: 0.99 MG/DL — SIGNIFICANT CHANGE UP (ref 0.5–1.3)
EOSINOPHIL # BLD AUTO: 0.36 K/UL — SIGNIFICANT CHANGE UP (ref 0–0.5)
EOSINOPHIL NFR BLD AUTO: 5.5 % — SIGNIFICANT CHANGE UP (ref 0–6)
GAS PNL BLDV: 145 MMOL/L — SIGNIFICANT CHANGE UP (ref 135–145)
GAS PNL BLDV: SIGNIFICANT CHANGE UP
GAS PNL BLDV: SIGNIFICANT CHANGE UP
GLUCOSE BLDV-MCNC: 105 MG/DL — HIGH (ref 70–99)
GLUCOSE SERPL-MCNC: 104 MG/DL — HIGH (ref 70–99)
HCO3 BLDV-SCNC: 28 MMOL/L — HIGH (ref 20–26)
HCT VFR BLD CALC: 34.4 % — LOW (ref 34.5–45)
HCT VFR BLDA CALC: 33 — LOW (ref 39–50)
HGB BLD CALC-MCNC: 10.7 G/DL — LOW (ref 11.5–15.5)
HGB BLD-MCNC: 10 G/DL — LOW (ref 11.5–15.5)
IMM GRANULOCYTES NFR BLD AUTO: 0.3 % — SIGNIFICANT CHANGE UP (ref 0–1.5)
LACTATE BLDV-MCNC: 0.6 MMOL/L — SIGNIFICANT CHANGE UP (ref 0.5–2)
LYMPHOCYTES # BLD AUTO: 1.3 K/UL — SIGNIFICANT CHANGE UP (ref 1–3.3)
LYMPHOCYTES # BLD AUTO: 19.8 % — SIGNIFICANT CHANGE UP (ref 13–44)
MCHC RBC-ENTMCNC: 23.1 PG — LOW (ref 27–34)
MCHC RBC-ENTMCNC: 29.1 GM/DL — LOW (ref 32–36)
MCV RBC AUTO: 79.6 FL — LOW (ref 80–100)
MONOCYTES # BLD AUTO: 0.42 K/UL — SIGNIFICANT CHANGE UP (ref 0–0.9)
MONOCYTES NFR BLD AUTO: 6.4 % — SIGNIFICANT CHANGE UP (ref 2–14)
NEUTROPHILS # BLD AUTO: 4.43 K/UL — SIGNIFICANT CHANGE UP (ref 1.8–7.4)
NEUTROPHILS NFR BLD AUTO: 67.5 % — SIGNIFICANT CHANGE UP (ref 43–77)
NT-PROBNP SERPL-SCNC: 470 PG/ML — HIGH (ref 0–300)
OTHER CELLS CSF MANUAL: 8 ML/DL — LOW (ref 18–22)
PCO2 BLDV: 62 MMHG — HIGH (ref 35–50)
PH BLDV: 7.33 — SIGNIFICANT CHANGE UP (ref 7.32–7.43)
PLATELET # BLD AUTO: 372 K/UL — SIGNIFICANT CHANGE UP (ref 150–400)
PO2 BLDV: 31 MMHG — SIGNIFICANT CHANGE UP (ref 25–45)
POTASSIUM BLDV-SCNC: 4.2 MMOL/L — SIGNIFICANT CHANGE UP (ref 3.4–4.5)
POTASSIUM SERPL-MCNC: 4.2 MMOL/L — SIGNIFICANT CHANGE UP (ref 3.5–5.3)
POTASSIUM SERPL-SCNC: 4.2 MMOL/L — SIGNIFICANT CHANGE UP (ref 3.5–5.3)
PROT SERPL-MCNC: 7.5 G/DL — SIGNIFICANT CHANGE UP (ref 6.6–8.7)
RBC # BLD: 4.32 M/UL — SIGNIFICANT CHANGE UP (ref 3.8–5.2)
RBC # FLD: 17.5 % — HIGH (ref 10.3–14.5)
SAO2 % BLDV: 56 % — SIGNIFICANT CHANGE UP
SODIUM SERPL-SCNC: 139 MMOL/L — SIGNIFICANT CHANGE UP (ref 135–145)
TROPONIN T SERPL-MCNC: 0.01 NG/ML — SIGNIFICANT CHANGE UP (ref 0–0.06)
TROPONIN T SERPL-MCNC: 0.02 NG/ML — SIGNIFICANT CHANGE UP (ref 0–0.06)
WBC # BLD: 6.56 K/UL — SIGNIFICANT CHANGE UP (ref 3.8–10.5)
WBC # FLD AUTO: 6.56 K/UL — SIGNIFICANT CHANGE UP (ref 3.8–10.5)

## 2020-08-02 PROCEDURE — 93010 ELECTROCARDIOGRAM REPORT: CPT

## 2020-08-02 PROCEDURE — 99220: CPT

## 2020-08-02 PROCEDURE — 99284 EMERGENCY DEPT VISIT MOD MDM: CPT

## 2020-08-02 PROCEDURE — 71045 X-RAY EXAM CHEST 1 VIEW: CPT | Mod: 26

## 2020-08-02 PROCEDURE — 71275 CT ANGIOGRAPHY CHEST: CPT | Mod: 26

## 2020-08-02 RX ORDER — ALBUTEROL 90 UG/1
2 AEROSOL, METERED ORAL EVERY 6 HOURS
Refills: 0 | Status: DISCONTINUED | OUTPATIENT
Start: 2020-08-02 | End: 2020-08-07

## 2020-08-02 RX ORDER — ATORVASTATIN CALCIUM 80 MG/1
10 TABLET, FILM COATED ORAL AT BEDTIME
Refills: 0 | Status: DISCONTINUED | OUTPATIENT
Start: 2020-08-02 | End: 2020-08-07

## 2020-08-02 RX ORDER — APIXABAN 2.5 MG/1
2.5 TABLET, FILM COATED ORAL
Refills: 0 | Status: DISCONTINUED | OUTPATIENT
Start: 2020-08-02 | End: 2020-08-07

## 2020-08-02 RX ORDER — ASPIRIN/CALCIUM CARB/MAGNESIUM 324 MG
81 TABLET ORAL DAILY
Refills: 0 | Status: DISCONTINUED | OUTPATIENT
Start: 2020-08-02 | End: 2020-08-07

## 2020-08-02 RX ORDER — IPRATROPIUM/ALBUTEROL SULFATE 18-103MCG
3 AEROSOL WITH ADAPTER (GRAM) INHALATION ONCE
Refills: 0 | Status: COMPLETED | OUTPATIENT
Start: 2020-08-02 | End: 2020-08-02

## 2020-08-02 RX ORDER — UMECLIDINIUM BROMIDE AND VILANTEROL TRIFENATATE 62.5; 25 UG/1; UG/1
1 POWDER RESPIRATORY (INHALATION) DAILY
Refills: 0 | Status: DISCONTINUED | OUTPATIENT
Start: 2020-08-02 | End: 2020-08-07

## 2020-08-02 RX ORDER — CEFUROXIME AXETIL 250 MG
250 TABLET ORAL EVERY 12 HOURS
Refills: 0 | Status: DISCONTINUED | OUTPATIENT
Start: 2020-08-02 | End: 2020-08-07

## 2020-08-02 RX ORDER — PANTOPRAZOLE SODIUM 20 MG/1
40 TABLET, DELAYED RELEASE ORAL
Refills: 0 | Status: DISCONTINUED | OUTPATIENT
Start: 2020-08-02 | End: 2020-08-07

## 2020-08-02 RX ORDER — IPRATROPIUM/ALBUTEROL SULFATE 18-103MCG
3 AEROSOL WITH ADAPTER (GRAM) INHALATION EVERY 6 HOURS
Refills: 0 | Status: DISCONTINUED | OUTPATIENT
Start: 2020-08-02 | End: 2020-08-07

## 2020-08-02 RX ADMIN — ALBUTEROL 2 PUFF(S): 90 AEROSOL, METERED ORAL at 18:55

## 2020-08-02 RX ADMIN — Medication 40 MILLIGRAM(S): at 18:55

## 2020-08-02 NOTE — ED ADULT NURSE NOTE - OBJECTIVE STATEMENT
Assumed pt care @1630. Pt received A&Ox3 c/o SOB secondary to COPD. Pt states she quit smoking cigarettes 10 years ago. Pt states she recently began NC O2 @ home. Pt states she is unsure how many L of O2 she was receiving @ home. Pt admits to having a dry cough. Pt placed on CM 98 bpm in NSR,  of 100% 2L NC. Pt breathing even, belly breathing to be noted, pt states that is her baseline. Pt resting comfortably in stretcher w/call bell in reach. Pt denies any HA, fever, chills, dizziness, blurred vision, chest pain, NVD, dysuria. MAEx4 w/purpose. Pt safety maintained. VSS. NAD. Pt made aware of plan of care.

## 2020-08-02 NOTE — ED CDU PROVIDER INITIAL DAY NOTE - DETAILS
PT seen by cards RECS followed, Pt with recent falls and as per family increased difficulty living at home, Pt with possible acute Fx will obtain MR, PT/CM consult

## 2020-08-02 NOTE — ED CDU PROVIDER INITIAL DAY NOTE - MEDICAL DECISION MAKING DETAILS
PT seen by cards RECS followed, Pt with possible acute Fx will obtain MR, PT/CM consult. PT seen BY OBS PA found to be appropriate CDU PT care transferred to PA.

## 2020-08-02 NOTE — ED PROVIDER NOTE - NS ED ROS FT
Denies f/c/n/v/cp/sob/palpitations/ cough/rash/headache/dizziness/abd.pain/d/c/dysuria/hematuria  +sob nausea

## 2020-08-02 NOTE — ED PROVIDER NOTE - PHYSICAL EXAMINATION
Head: atraumatic, normacephalic  eyes: perrla eomi  heart: rrr s1s2  lungs: poor inspiratory/expiratory effort, no wheezing  abd: soft, nt nd +bs no rebound/guarding no cva ttp  skin: warm  LE: no swelling, no calf ttp  back: no midline cervical/thoracic/lumbar ttp

## 2020-08-02 NOTE — ED PROVIDER NOTE - PROGRESS NOTE DETAILS
per: pt signed out to  pending cta if neg for pe ok to place in obs for acs work up/tte tomorrow; evaluated by [facility]

## 2020-08-02 NOTE — ED CDU PROVIDER INITIAL DAY NOTE - PROGRESS NOTE DETAILS
Pt family contacted (daughter Hannah [phone]) who states that Pt has been seen in [facility] facilities frequently, PA looked pt up was able to review old charts, Pt with extensile CARD hx, unchanged Aortic abnormality, but new changes to spine. Family states that she has been having increased difficulty ambulating and had fall one wk ago but was not evaluated for the fall.

## 2020-08-02 NOTE — ED PROVIDER NOTE - OBJECTIVE STATEMENT
79yo F hx of htn copd on 2L of home o2 x 6 months now pw worsening sob since today. also notes nausea. Denies f/c//v/cp/palpitations/ cough/rash/headache/dizziness/abd.pain/d/c/dysuria/hematuria. notes that uses a wheelchair at baseline no hx of dvt/pt no sick contacts no recent travel recently started on abx for uti no dysuria currently

## 2020-08-02 NOTE — ED CDU PROVIDER INITIAL DAY NOTE - ATTENDING CONTRIBUTION TO CARE
I personally saw the patient with the PA, and completed the key components of the history and physical exam. I then discussed the management plan with the PA.   gen in nad resp mild expiratory wheeze cardiac no murmur abd soft neuro intact   agree with pa plan of care

## 2020-08-02 NOTE — ED CDU PROVIDER INITIAL DAY NOTE - PMH
A-fib    COPD (chronic obstructive pulmonary disease)    CVA (cerebral vascular accident)    HTN (hypertension)

## 2020-08-02 NOTE — ED CDU PROVIDER INITIAL DAY NOTE - OBJECTIVE STATEMENT
79yo F hx of htn copd on 2L of home o2 x 6 months now pw worsening sob since today. Denies f/c//v/cp/palpitations/ cough/rash/headache/dizziness/abd.pain/d/c/dysuria/hematuria. notes that uses a wheelchair at baseline no hx of dvt/pt no sick contacts no recent travel recently started on abx for uti no dysuria currently

## 2020-08-02 NOTE — CONSULT NOTE ADULT - ASSESSMENT
Patient is an elderly 79yo F hx of htn copd on 2L of home o2 x 6 months now coming to ER with worsening sob since today. also notes nausea. Denies f/c//v/cp/palpitations/ cough/rash/headache/dizziness. Patient states that she is on Eliquis for history of Stroke. Patient denies any history of CAD or CHF. Patient quit smoking more than a year ago. Patient denies any chest tightness. No ETOH abuse history.    EKG unremarkable, Slightly elevated BNP, negative troponin    Assessment:  1. Shortness of breath  2. COPD/Former smoker    Recommendations:  1. Serial Trops  2. ECHO    Will follow

## 2020-08-03 VITALS
OXYGEN SATURATION: 95 % | TEMPERATURE: 99 F | DIASTOLIC BLOOD PRESSURE: 74 MMHG | SYSTOLIC BLOOD PRESSURE: 162 MMHG | RESPIRATION RATE: 18 BRPM | HEART RATE: 97 BPM

## 2020-08-03 DIAGNOSIS — F41.8 OTHER SPECIFIED ANXIETY DISORDERS: ICD-10-CM

## 2020-08-03 DIAGNOSIS — R41.89 OTHER SYMPTOMS AND SIGNS INVOLVING COGNITIVE FUNCTIONS AND AWARENESS: ICD-10-CM

## 2020-08-03 LAB
SARS-COV-2 RNA SPEC QL NAA+PROBE: SIGNIFICANT CHANGE UP
TROPONIN T SERPL-MCNC: 0.01 NG/ML — SIGNIFICANT CHANGE UP (ref 0–0.06)

## 2020-08-03 PROCEDURE — 82435 ASSAY OF BLOOD CHLORIDE: CPT

## 2020-08-03 PROCEDURE — 83880 ASSAY OF NATRIURETIC PEPTIDE: CPT

## 2020-08-03 PROCEDURE — 71045 X-RAY EXAM CHEST 1 VIEW: CPT

## 2020-08-03 PROCEDURE — 72131 CT LUMBAR SPINE W/O DYE: CPT | Mod: 26

## 2020-08-03 PROCEDURE — 76377 3D RENDER W/INTRP POSTPROCES: CPT | Mod: 26

## 2020-08-03 PROCEDURE — 82330 ASSAY OF CALCIUM: CPT

## 2020-08-03 PROCEDURE — 99284 EMERGENCY DEPT VISIT MOD MDM: CPT | Mod: 25

## 2020-08-03 PROCEDURE — 72146 MRI CHEST SPINE W/O DYE: CPT | Mod: 26

## 2020-08-03 PROCEDURE — U0003: CPT

## 2020-08-03 PROCEDURE — 93005 ELECTROCARDIOGRAM TRACING: CPT

## 2020-08-03 PROCEDURE — 80053 COMPREHEN METABOLIC PANEL: CPT

## 2020-08-03 PROCEDURE — 82803 BLOOD GASES ANY COMBINATION: CPT

## 2020-08-03 PROCEDURE — 36415 COLL VENOUS BLD VENIPUNCTURE: CPT

## 2020-08-03 PROCEDURE — 72146 MRI CHEST SPINE W/O DYE: CPT

## 2020-08-03 PROCEDURE — 85027 COMPLETE CBC AUTOMATED: CPT

## 2020-08-03 PROCEDURE — 84484 ASSAY OF TROPONIN QUANT: CPT

## 2020-08-03 PROCEDURE — 72125 CT NECK SPINE W/O DYE: CPT | Mod: 26

## 2020-08-03 PROCEDURE — 93306 TTE W/DOPPLER COMPLETE: CPT

## 2020-08-03 PROCEDURE — 72125 CT NECK SPINE W/O DYE: CPT

## 2020-08-03 PROCEDURE — 93010 ELECTROCARDIOGRAM REPORT: CPT

## 2020-08-03 PROCEDURE — 72192 CT PELVIS W/O DYE: CPT

## 2020-08-03 PROCEDURE — 94640 AIRWAY INHALATION TREATMENT: CPT

## 2020-08-03 PROCEDURE — 99217: CPT

## 2020-08-03 PROCEDURE — 72192 CT PELVIS W/O DYE: CPT | Mod: 26

## 2020-08-03 PROCEDURE — 85014 HEMATOCRIT: CPT

## 2020-08-03 PROCEDURE — 84132 ASSAY OF SERUM POTASSIUM: CPT

## 2020-08-03 PROCEDURE — 84295 ASSAY OF SERUM SODIUM: CPT

## 2020-08-03 PROCEDURE — 72131 CT LUMBAR SPINE W/O DYE: CPT

## 2020-08-03 PROCEDURE — 71275 CT ANGIOGRAPHY CHEST: CPT

## 2020-08-03 PROCEDURE — 82947 ASSAY GLUCOSE BLOOD QUANT: CPT

## 2020-08-03 PROCEDURE — G0378: CPT

## 2020-08-03 PROCEDURE — 83605 ASSAY OF LACTIC ACID: CPT

## 2020-08-03 PROCEDURE — 99284 EMERGENCY DEPT VISIT MOD MDM: CPT

## 2020-08-03 PROCEDURE — 76377 3D RENDER W/INTRP POSTPROCES: CPT

## 2020-08-03 PROCEDURE — 99232 SBSQ HOSP IP/OBS MODERATE 35: CPT

## 2020-08-03 RX ORDER — AMLODIPINE BESYLATE 2.5 MG/1
1 TABLET ORAL
Qty: 0 | Refills: 0 | DISCHARGE

## 2020-08-03 RX ORDER — SERTRALINE 25 MG/1
25 TABLET, FILM COATED ORAL DAILY
Refills: 0 | Status: DISCONTINUED | OUTPATIENT
Start: 2020-08-03 | End: 2020-08-07

## 2020-08-03 RX ORDER — ASPIRIN/CALCIUM CARB/MAGNESIUM 324 MG
81 TABLET ORAL
Qty: 0 | Refills: 0 | DISCHARGE

## 2020-08-03 RX ORDER — SERTRALINE 25 MG/1
1 TABLET, FILM COATED ORAL
Qty: 14 | Refills: 0
Start: 2020-08-03 | End: 2020-08-16

## 2020-08-03 RX ORDER — ACETAMINOPHEN 500 MG
650 TABLET ORAL EVERY 6 HOURS
Refills: 0 | Status: DISCONTINUED | OUTPATIENT
Start: 2020-08-03 | End: 2020-08-07

## 2020-08-03 RX ADMIN — Medication 250 MILLIGRAM(S): at 06:27

## 2020-08-03 RX ADMIN — APIXABAN 2.5 MILLIGRAM(S): 2.5 TABLET, FILM COATED ORAL at 16:50

## 2020-08-03 RX ADMIN — Medication 81 MILLIGRAM(S): at 16:51

## 2020-08-03 RX ADMIN — SERTRALINE 25 MILLIGRAM(S): 25 TABLET, FILM COATED ORAL at 16:48

## 2020-08-03 RX ADMIN — UMECLIDINIUM BROMIDE AND VILANTEROL TRIFENATATE 1 PUFF(S): 62.5; 25 POWDER RESPIRATORY (INHALATION) at 16:45

## 2020-08-03 RX ADMIN — APIXABAN 2.5 MILLIGRAM(S): 2.5 TABLET, FILM COATED ORAL at 06:27

## 2020-08-03 RX ADMIN — Medication 250 MILLIGRAM(S): at 16:51

## 2020-08-03 NOTE — BEHAVIORAL HEALTH ASSESSMENT NOTE - NSBHCONSULTRECOMMENDOTHER_PSY_A_CORE FT
Recommend to start Sertraline 25mg for anxiety  Recommend work up for dementia due to cognitive impairment

## 2020-08-03 NOTE — ED CDU PROVIDER SUBSEQUENT DAY NOTE - MEDICAL DECISION MAKING DETAILS
elderly female with multiple comorbiiiidities is SOB  work up in pprogress is echo [facility] mr and pt

## 2020-08-03 NOTE — ED CDU PROVIDER DISPOSITION NOTE - PROVIDER TOKENS
PROVIDER:[TOKEN:[[id number]],FOLLOWUP:[1-3 Days]],PROVIDER:[TOKEN:[[id number]],FOLLOWUP:[4-6 Days]]

## 2020-08-03 NOTE — CONSULT NOTE ADULT - SUBJECTIVE AND OBJECTIVE BOX
Vascular Attending:  Dr Guo      HPI: 81yo F hx of htn copd on 2L of home o2 x 6 months now who chart states pw worsening sob  and nausea. Denies f/c//v/cp/palpitations/ cough/rash/headache/dizziness/abd.pain/d/c/dysuria/hematuria.States that uses a wheelchair at baseline no hx of dvt/pt no sick contacts no recent travel recently started on abx for uti    Had CT in ED which revealed thoracic aneurysm with thrombus and vascular surgery consulted.    PAST MEDICAL & SURGICAL HISTORY:  HTN (hypertension)  A-fib  CVA (cerebral vascular accident)  COPD (chronic obstructive pulmonary disease)  Hypertension  Cerebrovascular accident (CVA)  Former smoker  Gallstones  Hyperlipidemia  Dizziness  Stenosis of carotid artery: may 2017  COPD (chronic obstructive pulmonary disease)  No significant past surgical history  H/O carotid endarterectomy      REVIEW OF SYSTEMS-as above. All other ROS neg      MEDICATIONS  (STANDING):  apixaban 2.5 milliGRAM(s) Oral two times a day  aspirin enteric coated 81 milliGRAM(s) Oral daily  atorvastatin 10 milliGRAM(s) Oral at bedtime  cefuroxime   Tablet 250 milliGRAM(s) Oral every 12 hours  pantoprazole    Tablet 40 milliGRAM(s) Oral before breakfast  umeclidinium 62.5 MICROgram(s)/vilanterol 25 MICROgram(s) Inhaler 1 Puff(s) Inhalation daily    MEDICATIONS  (PRN):  acetaminophen   Tablet .. 650 milliGRAM(s) Oral every 6 hours PRN Mild Pain (1 - 3)  ALBUTerol    90 MICROgram(s) HFA Inhaler 2 Puff(s) Inhalation every 6 hours PRN Wheezing  albuterol/ipratropium for Nebulization 3 milliLiter(s) Nebulizer every 6 hours PRN Shortness of Breath      Allergies  No Known Allergies    SOCIAL HISTORY: . Lived with daughter. Essentially WC dependent, however uses RW for bathroom/transfers. Nonsmoker, no ETOH      Vital Signs Last 24 Hrs  T(C): 36.6 (03 Aug 2020 11:07), Max: 37.1 (02 Aug 2020 19:53)  T(F): 97.9 (03 Aug 2020 11:07), Max: 98.7 (02 Aug 2020 19:53)  HR: 72 (03 Aug 2020 11:07) (72 - 103)  BP: 156/63 (03 Aug 2020 11:07) (126/69 - 175/78)  BP(mean): --  RR: 20 (03 Aug 2020 11:07) (20 - 22)  SpO2: 95% (03 Aug 2020 11:07) (95% - 99%)    PHYSICAL EXAM:  Constitutional: WD, WN F in NAD  Eyes: PERRL  ENMT: WNL  Neck: No JVD or carotid bruits  Respiratory: Diminished throughout  Cardiovascular: Normal S1, S2  Gastrointestinal: Soft ND, NT, + BS. No pulsatile masses  Extremities: No e/c/c  Neurological: A&O x 3, ORDONEZ x 4 =  Pulses:   Right:                                                                          Left:  FEM [x ]2+ [ ]1+ [ ]doppler                                    FEM [x ]2+ [ ]1+ [ ]doppler    POP [ x]2+ [ ]1+ [ ]doppler                                    POP [ x]2+ [ ]1+ [ ]doppler    DP [x ]2+ [ ]1+ [ ]doppler                                      DP [x ]2+ [ ]1+ [ ]doppler  PT[ ]2+ [x ]1+ [ ]doppler                                        PT [ ]2+ [x ]1+ [ ]doppler      LABS:                        10.0   6.56  )-----------( 372      ( 02 Aug 2020 17:41 )             34.4     08-02    139  |  98  |  22.0<H>  ----------------------------<  104<H>  4.2   |  29.0  |  0.99    Ca    9.3      02 Aug 2020 17:41    TPro  7.5  /  Alb  4.2  /  TBili  0.7  /  DBili  x   /  AST  19  /  ALT  8   /  AlkPhos  123<H>  08-02      RADIOLOGY & ADDITIONAL STUDIES  < from: CT Angio Chest w/ IV Cont (08.02.20 @ 20:00) >   EXAM:  CT ANGIO CHEST (W)AW IC                          PROCEDURE DATE:  08/02/2020          INTERPRETATION:  CLINICAL INFORMATION: Shortness of breath    COMPARISON: None.    PROCEDURE:  CT Angiography of the Chest.  48 ml of Omnipaque 350 was injected intravenously.  Sagittal and coronal reformats were performed as well as 3D (MIP) reconstructions.    FINDINGS:    LUNGS AND AIRWAYS: Patent central airways.  Biapical pleural parenchymal scarring. Mild centrilobular emphysema. No lung mass, suspicious nodule, or lung consolidation.  PLEURA: No pleural effusion.  MEDIASTINUM AND TARA: No lymphadenopathy.  VESSELS: Adequate pulmonary arterial opacification. Evaluation mildly degraded by respiratory motion. No pulmonary embolism. Main pulmonary artery is not dilated. Atherosclerotic disease of the thoracic aorta with mild aneurysmal dilatation. Ascending thoracic aorta measures up to 4 cm. Distal descending thoracic aorta measures up to 3.4 cm. Irregular peripheral thrombus within the descending thoracic aorta. Coronary artery atherosclerotic calcifications.  HEART: Heart size is normal. Small pericardial effusion.  CHEST WALL AND LOWER NECK: Within normal limits.  VISUALIZED UPPER ABDOMEN: Cholecystectomy clips. Mild thickening of the adrenal glands.  BONES: Multiple old right posterior rib fractures. Indeterminate age mild compression fractures of T2, T8, and T10. Degenerative changes of the spine.    IMPRESSION:  No pulmonary embolism.    No acute parenchymal or pleural lung disease.    Biapical pleural parenchymal scarring.    Atherosclerotic disease of the thoracic aorta with mild aneurysmal dilatation; ascending thoracic aorta measures up to 4 cm and distal descending thoracic aorta measures up to 3.4 cm. Irregular peripheral thrombus within the descending thoracic aorta.    Indeterminate age mild compression fractures of T2, T8, and T10.      Impression and Plan: 79 y/o F with COPD on home O2 who p/w increased SOB. CT chest neg for PE and pt noted with thoracic descending aortic thrombus  Incidental findings on CT scan  No vascular surgical intervention indicated at this time  Discussed with Dr Guo
[facility]                                                        Office: [address] [address]                                                       Telephone: [phone]. Fax:[phone]                                                              CARDIOLOGY CONSULTATION NOTE                                                                                             Consult requested by:      PCP:?    Primary Cardiologist.: ?    Reason for Consultation:  Shortness of breath    History obtained by: Patient, Poor informant   obtained: No    Chief complaint:    Patient is a 80y old  Female who presents with a chief complaint of  shortness of breath    HPI:    Patient is an elderly 81yo F hx of htn copd on 2L of home o2 x 6 months now coming to ER with worsening sob since today. also notes nausea. Denies f/c//v/cp/palpitations/ cough/rash/headache/dizziness. Patient states that she is on Eliquis for history of Stroke. Patient denies any history of CAD or CHF. Patient quit smoking more than a year ago. Patient denies any chest tightness. No ETOH abuse history.    ALLERGIES: Allergies    No Known Allergies    Intolerances          CURRENT MEDICATIONS:  MEDICATIONS  (STANDING):  albuterol/ipratropium for Nebulization. 3 milliLiter(s) Nebulizer once  predniSONE   Tablet 40 milliGRAM(s) Oral Once      HOME MEDICATIONS:  Home Medications:    PAST MEDICAL HISTORY  COPD  Stroke    PAST SURGICAL HISTORY  None reported    FAMILY HISTORY:      SOCIAL HISTORY:       CIGARETTES:   Quit smoking >1 year ago    ALCOHOL: No    DRUG ABUSE: NO      REVIEW OF SYSTEMS:  CONSTITUTIONAL:  as per HPI  HEENT:  Eyes:  No diplopia or blurred vision. ENT:  No earache, sore throat or runny nose.  CARDIOVASCULAR:  No pressure, squeezing, strangling, tightness, heaviness or aching about the chest, neck, axilla or epigastrium.  RESPIRATORY:  No cough, +shortness of breath, PND or orthopnea.  GASTROINTESTINAL:  No nausea, vomiting or diarrhea.  GENITOURINARY:  No dysuria, frequency or urgency. No Blood in urine  MUSCULOSKELETAL:  no joint aches, no muscle pain  SKIN:  No change in skin, hair or nails.  NEUROLOGIC:  No paresthesias, fasciculations, seizures or weakness. +prior history of stroke  PSYCHIATRIC:  No disorder of thought or mood.  ENDOCRINE:  No heat or cold intolerance, polyuria or polydipsia.  HEMATOLOGICAL:  No easy bruising or bleeding.           	        Vital Signs Last 24 Hrs  T(C): 36.7 (08-02-20 @ 15:54), Max: 36.7 (08-02-20 @ 15:54)  T(F): 98.1 (08-02-20 @ 15:54), Max: 98.1 (08-02-20 @ 15:54)  HR: 93 (08-02-20 @ 15:54) (93 - 93)  BP: 133/67 (08-02-20 @ 15:54) (133/67 - 133/67)  BP(mean): --  RR: 20 (08-02-20 @ 15:54) (20 - 20)  SpO2: 99% (08-02-20 @ 15:54) (99% - 99%)    PHYSICAL EXAM:  Constitutional: Comfortable . No acute distress.  Frail elderly lady,   HEENT: Atraumatic and normcephalic , neck is supple . no JVD. Unremarkable  CNS: Alert and awake, Grossly nonfocal.  Lymph Nodes: Cervical : Not palpable.  Respiratory: Bilateral clear breath sounds.  Cardiovascular: Normal S1S2. . No murmur/rubs or gallop.  Gastrointestinal: Soft non-tender and non distended . +Bowel sounds.   Extremities: No leg edema.   Psychiatric: Calm . no agitation.  Skin: No skin rash.    Intake and output:     LABS:                        10.0   6.56  )-----------( 372      ( 02 Aug 2020 17:41 )             34.4     08-02    139  |  98  |  22.0<H>  ----------------------------<  104<H>  4.2   |  29.0  |  0.99    Ca    9.3      02 Aug 2020 17:41    TPro  7.5  /  Alb  4.2  /  TBili  0.7  /  DBili  x   /  AST  19  /  ALT  8   /  AlkPhos  123<H>  08-02    CARDIAC MARKERS ( 02 Aug 2020 17:41 )  x     / 0.02 ng/mL / x     / x     / x        ;p-BNP=Serum Pro-Brain Natriuretic Peptide: 470 pg/mL (08-02 @ 17:41)        LIVER FUNCTIONS - ( 02 Aug 2020 17:41 )  Alb: 4.2 g/dL / Pro: 7.5 g/dL / ALK PHOS: 123 U/L / ALT: 8 U/L / AST: 19 U/L / GGT: x           INTERPRETATION OF TELEMETRY: Reviewed by me.   ECG: Reviewed by me.  NSR, Rate 79/min, No acute STT changes.    RADIOLOGY & ADDITIONAL STUDIES/ECHO/CARDIAC CATH:

## 2020-08-03 NOTE — PROVIDER CONTACT NOTE (OTHER) - ASSESSMENT
Pt on OBS for d/c home today .Will be staying with son Eric and daughter essence for a month. They already had home PT to be started on Thursday for [facility]. Called and spoke with Jairo from [facility] and will fax d/c note. Pt has all DMES along with home o2 and son will bring port o2 when he picks her up.
Pt with 0/10 pain before, during or after RX.  Pt will benefit from PT to maximize functional independence.  Will continue to follow.  Pt left supine in bed in no apparent distress and call bell within reach. Nurse aware

## 2020-08-03 NOTE — BEHAVIORAL HEALTH ASSESSMENT NOTE - DIFFERENTIAL
Anxiety- patient is anxious with the possibility of being left alone.   Cognitive impairment- patient has difficulty recalling address, names and phone numbers.

## 2020-08-03 NOTE — ED CDU PROVIDER DISPOSITION NOTE - NSFOLLOWUPINSTRUCTIONS_ED_ALL_ED_FT
- Please follow up with your Primary Care Doctor in 24-48 hr.  - Seek immediate medical care for any new, worsening or concerning signs or symptoms.   - Take medications as directed, be sure to read all instructions on packaging  - You were given copies of all your test results, please bring to your primary care doctor for review of any abnormal test results    Feel better!

## 2020-08-03 NOTE — ED ADULT NURSE REASSESSMENT NOTE - NS ED NURSE REASSESS COMMENT FT1
Assumed pt care @0730 from night RN. Pt resting comfortably in stretcher. Pt remains on CM with  of 98% on 2L NC. L 20 gauge AC. Pt breathing even and unlabored. NAD. Pt made aware of plan of care.
MD orders active for discharge. Pt ready for discharge. Pt verbalized understanding of discharge instruction and medication amanda. Discharge instruction sheet given to pts son and one copy placed in chart. Pts vitals stable. Pts IV removed from LAC 20 gauge and pt tolerated well. All of pts belongings were send with pt. Documentation completed. Safe discharge completed. Pt transported by son via car; oxygen tank provided by son.
Pt received from ED RN Eric. EAN   A&OX3 forgetful at times. Pt denies chest pain,sob,dizziness and headache. No acute distress noted. On 2L nasal cannula. NSR on the monitor. Alarms reviewed with monitor tech. Pt denies generalized pain at this time.  Patent 20 LAC. Will continue to monitor. Call bell within reach. Within nursing station view. Awaiting patient to be picked up by son Discharge paper given to patient and education given to Son via phone as per nurse. All belongings to go with patient. Awaiting .
Received report from LEATHA Salomon. Pt to be moved to CDU for observation.
Report received from off-going RN at 19:30, VSS, pt resting comfortably in stretcher on cardiac monitor and . Returned from CTA at this time, awaiting results. No s/s of apparent distress noted. Tolerated O2 via NC well. Pending dispo.

## 2020-08-03 NOTE — ED CDU PROVIDER SUBSEQUENT DAY NOTE - PROGRESS NOTE DETAILS
Awaiting  Cardio follow up.  Echo not read. Ascending aorta 4 cm and descending 3.4 with a thrombosis.  PT home  with home PT  will follow  Compression fractures are chronic. spoke to daughter  reviewed findings and care plan   daughter requested psych eval bc pt  afraid to b e alone, doesn't want to do anything or go anywhere  vascular consult was pending at time of discussion  family unable to  until 7 pm Cleared by cardiology and vascular, MR shows chronic compression fx. Pending psych. Reviewed all results and plan with Dr. Cordova. Pt cleared by psych, recommending Zoloft.  Pt stable for d/c, reports improvement, VSS, tolerating PO, ambulatory.  Discussion includes results, plan, proper medication use/side effects, and return precautions. Pt advised to f/u with PMD 1-2 days and specialists discussed.  Pt given printed copies of lab/radiology for outpt follow up. Pt verbalized understanding/agreement of plan. I also discussed all the above with pt's daughter, Hannah, via telephone who verbalized understanding and agreement of plan.

## 2020-08-03 NOTE — ED CDU PROVIDER DISPOSITION NOTE - CARE PROVIDER_API CALL
Horacio Mendez  CLINICAL NEUROPHYSIOLOGY  [address] [place] [address]  Phone: [phone]  Fax: [phone]  Follow Up Time: 1-3 Days    Phill Guo  SURGERY  [address] [place] [address]  Phone: [phone]  Fax: [phone]  Follow Up Time: 4-6 Days

## 2020-08-03 NOTE — ED CDU PROVIDER DISPOSITION NOTE - CLINICAL COURSE
80 F with SOB (COPD on home O2), frequent falls. Placed in observation. Seen and cleared by cardiology for outpatient follow up. Seen by vascular for aneurysm / clot, outpatient follow up. Chronic compression fx in spine can also follow up with outpatient spine. Psych recommending zoloft.

## 2020-08-03 NOTE — PROGRESS NOTE ADULT - ASSESSMENT
Patient is an elderly 81yo F hx of htn copd on 2L of home o2 x 6 months now coming to ER with worsening sob since today. also notes nausea. Denies f/c//v/cp/palpitations/ cough/rash/headache/dizziness. Patient states that she is on Eliquis for history of Stroke. Patient denies any history of CAD or CHF. Patient quit smoking more than a year ago. Patient denies any chest tightness. No ETOH abuse history.    EKG unremarkable, Slightly elevated BNP, negative troponin    Patients Troponins are negative.  Patients echo is unremarkable.    Patient states that she has a Cardiologist at [facility]    Assessment:  1. Shortness of breath  2. COPD/Former smoker    Recommendations:  Patients echo is unremarkable. Troponins are negative.  Patients dyspnea more likely from her underlying chronic COPD.  Patient is advised to f/u her own Cardiologist at [facility].    From a Cardiology perspective, Patient may be discharged with advice for her to f/u PCP and her own Cardiologist    Discussed with ER TYLER Zimmerman    I will sign off.

## 2020-08-03 NOTE — PHYSICAL THERAPY INITIAL EVALUATION ADULT - ADDITIONAL COMMENTS
Pt reports living on the 1st floor of a 2 story house with 2 steps to enter.  Lives with son and Daughter-in-law.  PTA, amb with RW or uses W/C. Reports family assists pt with self care.

## 2020-08-03 NOTE — ED CDU PROVIDER DISPOSITION NOTE - CARE PROVIDERS DIRECT ADDRESSES
[email or web address]

## 2020-08-03 NOTE — BEHAVIORAL HEALTH ASSESSMENT NOTE - NS ED BHA SUICIDALITY PRESENT CURRENT INTENT DETAILS COLLATERAL FT
As per daughter Hannah, patient is anxious about being alone and constantly complains about shortness of breath and nausea when they need to leave.

## 2020-08-03 NOTE — ED CDU PROVIDER DISPOSITION NOTE - NSFOLLOWUPCLINICS_GEN_ALL_ED_FT
A Pulmonologist  Pulmonary Medicine  .  [place]   Phone:   Fax:   Follow Up Time: 4-6 Days    An OB/GYN physician  Obstetrics & Gynecology  .  [place]   Phone:   Fax:   Follow Up Time: 4-6 Days    [facility]  Cardiology  [address], [address]  [place] [address]  Phone: [phone]  Fax:   Follow Up Time: 4-6 Days    [facility] [place]  Neurosurgery/Spine  [address], [address]  [place] [address]  Phone: [phone]  Fax:   Follow Up Time: 4-6 Days

## 2020-08-03 NOTE — ED ADULT NURSE REASSESSMENT NOTE - COMFORT CARE
AM care given
assisted to bedpan
assisted to bedpan/side rails up
plan of care explained/po fluids offered/meal provided/assisted to bathroom/wait time explained
po fluids offered/assisted to bathroom/wait time explained/meal provided
plan of care explained/po fluids offered/assisted to bedpan/side rails up/meal provided/repositioned/warm blanket provided

## 2020-08-03 NOTE — ED CDU PROVIDER DISPOSITION NOTE - ATTENDING CONTRIBUTION TO CARE
seen with acp  discussed all results with daughter  constance multi physician f/u and start antiidepressant as per psych eval  agree with care and dispo

## 2020-08-03 NOTE — BEHAVIORAL HEALTH ASSESSMENT NOTE - NSBHCHARTREVIEWVS_PSY_A_CORE FT
Vital Signs Last 24 Hrs  T(C): 36.6 (03 Aug 2020 11:07), Max: 37.1 (02 Aug 2020 19:53)  T(F): 97.9 (03 Aug 2020 11:07), Max: 98.7 (02 Aug 2020 19:53)  HR: 72 (03 Aug 2020 11:07) (72 - 103)  BP: 156/63 (03 Aug 2020 11:07) (126/69 - 175/78)  RR: 20 (03 Aug 2020 11:07) (20 - 22)  SpO2: 95% (03 Aug 2020 11:07) (95% - 99%)

## 2020-08-03 NOTE — BEHAVIORAL HEALTH ASSESSMENT NOTE - HPI (INCLUDE ILLNESS QUALITY, SEVERITY, DURATION, TIMING, CONTEXT, MODIFYING FACTORS, ASSOCIATED SIGNS AND SYMPTOMS)
80 year old female presented to ED with shortness of breath. Patient was cooperative with evaluation. Patient denies any psychiatric history of anxiety or depression. Patient denies suicidal or homicidal ideation.  Patient denies insomnia or changes in appetite, patient states she is hungry and has not had lunch yet. Patient's  passed away two years ago and she had to put her dog down a few weeks ago. Patient struggles to recall addresses, names and phone numbers. Patient currently lives with son and daughter-in-law (Booker and Evelina), but is moving in with daughter (Hannah) in September. Spoke to Hannah ([phone]) on 8/3/2020 at 1400 who expressed the patient very anxious and scared to be left alone and scared to go to bed alone. Patient spends all day with son and daughter in law and gets very anxious and complains about shortness of breath and nausea if they need to leave the house without her.
spouse

## 2020-08-03 NOTE — BEHAVIORAL HEALTH ASSESSMENT NOTE - NSBHCHARTREVIEWLAB_PSY_A_CORE FT
10.0   6.56  )-----------( 372      ( 02 Aug 2020 17:41 )             34.4     08-02    139  |  98  |  22.0<H>  ----------------------------<  104<H>  4.2   |  29.0  |  0.99    Ca    9.3      02 Aug 2020 17:41    TPro  7.5  /  Alb  4.2  /  TBili  0.7  /  DBili  x   /  AST  19  /  ALT  8   /  AlkPhos  123<H>  08-02    LIVER FUNCTIONS - ( 02 Aug 2020 17:41 )  Alb: 4.2 g/dL / Pro: 7.5 g/dL / ALK PHOS: 123 U/L / ALT: 8 U/L / AST: 19 U/L / GGT: x

## 2020-08-03 NOTE — PROVIDER CONTACT NOTE (OTHER) - ACTION/TREATMENT ORDERED:
PT Goals( to achieve in 2 weeks);Pt independent with bed mobility. Pt mod. independent with transfers.  Pt mod I with amb with RW X 100 feet

## 2020-08-03 NOTE — ED CDU PROVIDER SUBSEQUENT DAY NOTE - PHYSICAL EXAMINATION
Head: atraumatic, normacephalic  eyes: perrla eomi  heart: rrr s1s2  lungs: poor inspiratory/expiratory effort, no wheezing  abd: soft, nt nd +bs no rebound/guarding no cva ttp  skin: warm, dry, intact.   LE: no swelling, no calf ttp  back: no midline cervical/thoracic/lumbar ttp

## 2020-08-03 NOTE — ED ADULT NURSE REASSESSMENT NOTE - GENERAL PATIENT STATE
resting/sleeping/comfortable appearance/cooperative/smiling/interactive
resting/sleeping/smiling/interactive/cooperative/comfortable appearance
comfortable appearance/smiling/interactive
resting/sleeping/comfortable appearance

## 2020-08-03 NOTE — ED CDU PROVIDER DISPOSITION NOTE - PATIENT PORTAL LINK FT
You can access the FollowMyHealth Patient Portal offered by [facility] by registering at the following website: [email or web address]. By joining [employer]’s FollowMyHealth portal, you will also be able to view your health information using other applications (apps) compatible with our system.

## 2020-08-03 NOTE — PROGRESS NOTE ADULT - SUBJECTIVE AND OBJECTIVE BOX
[place] CARDIOLOGY-[facility]                                                        Office: [address] [address]                                                       Telephone: [phone]. Fax:[phone]                                                                             PROGRESS NOTE    Subjective:  Patients breathing has improved. No chest pain.     Review of symptoms:   Cardiac:  No chest pain. improved dyspnea. No palpitations.  Respiratory:no cough. No dyspnea  Gastrointestinal: No diarrhea. No abdominal pain. No bleeding.   Neuro: No focal neuro complaints.      	  Vitals:  T(C): 36.6 (08-03-20 @ 11:07), Max: 37.1 (08-02-20 @ 19:53)  HR: 72 (08-03-20 @ 11:07) (72 - 103)  BP: 156/63 (08-03-20 @ 11:07) (126/69 - 175/78)  RR: 20 (08-03-20 @ 11:07) (20 - 22)  SpO2: 95% (08-03-20 @ 11:07) (95% - 99%)  Wt(kg): --  I&O's Summary    Weight (kg): 68 (08-02 @ 15:54)    PHYSICAL EXAM:  Appearance: Comfortable. No acute distress  HEENT:  Head and neck: Atraumatic. Normocephalic. , Neck is supple. No JVD,   Neurologic: Alert and awake, Grossly nonfocal.   Lymphatic: No cervical lymphadenopathy  Cardiovascular: Normal S1 S2, No murmurs. No JVD,   Respiratory: Lungs clear to auscultation  Gastrointestinal:  Soft, Non-tender, + BS  Lower Extremities: No edema  Psychiatry: Patient is calm. No agitation.  Skin: No rashes.    CURRENT MEDICATIONS:    MEDICATIONS  (STANDING):  apixaban 2.5 milliGRAM(s) Oral two times a day  aspirin enteric coated 81 milliGRAM(s) Oral daily  atorvastatin 10 milliGRAM(s) Oral at bedtime  cefuroxime   Tablet 250 milliGRAM(s) Oral every 12 hours  pantoprazole    Tablet 40 milliGRAM(s) Oral before breakfast  umeclidinium 62.5 MICROgram(s)/vilanterol 25 MICROgram(s) Inhaler 1 Puff(s) Inhalation daily      LABS:	 	                          10.0   6.56  )-----------( 372      ( 02 Aug 2020 17:41 )             34.4     08-02    139  |  98  |  22.0<H>  ----------------------------<  104<H>  4.2   |  29.0  |  0.99    Ca    9.3      02 Aug 2020 17:41    TPro  7.5  /  Alb  4.2  /  TBili  0.7  /  DBili  x   /  AST  19  /  ALT  8   /  AlkPhos  123<H>  08-02    proBNP: Serum Pro-Brain Natriuretic Peptide: 470 pg/mL (08-02 @ 17:41)    Lipid Profile:   CARDIAC MARKERS ( 03 Aug 2020 00:34 )  x     / 0.01 ng/mL / x     / x     / x      CARDIAC MARKERS ( 02 Aug 2020 22:24 )  x     / 0.01 ng/mL / x     / x     / x      CARDIAC MARKERS ( 02 Aug 2020 17:41 )  x     / 0.02 ng/mL / x     / x     / x          LIVER FUNCTIONS - ( 02 Aug 2020 17:41 )  Alb: 4.2 g/dL / Pro: 7.5 g/dL / ALK PHOS: 123 U/L / ALT: 8 U/L / AST: 19 U/L / GGT: x             TELEMETRY: Reviewed  - No significant arrhythmias    ECG:    < from: TTE Echo Complete w/o Contrast w/ Doppler (08.03.20 @ 08:44) >  Summary:   1. Left ventricular ejection fraction, by visual estimation, is >75%.   2. Hyperdynamic global left ventricular systolic function.   3. Increased LV wall thickness.   4. Spectral Doppler shows impaired relaxation pattern of left ventricular myocardial filling (Grade I diastolic dysfunction).   5. Mild mitral annular calcification.   6. Thickening and calcification of the anterior mitral valve leaflet.   7. Mild aortic regurgitation.   8. Sclerotic aortic valve with normal opening.    MD Malik Electronically signed on 8/3/2020 at 11:12:57 AM      < end of copied text >

## 2020-08-03 NOTE — BEHAVIORAL HEALTH ASSESSMENT NOTE - SUMMARY
80 year old female presented to ED with shortness of breath. Patient denies a psychiatric history of anxiety or depression. As per daughter (Hannah), patient is extremely anxious about being left alone. Patient shows cognitive impairment, struggles to recall addresses, names and phone numbers. The combination of SOB due to COPD, past fall and cognitive impairments contribute to current symptomatolgy

## 2020-09-17 ENCOUNTER — RX RENEWAL (OUTPATIENT)
Age: 80
End: 2020-09-17

## 2020-10-01 ENCOUNTER — APPOINTMENT (OUTPATIENT)
Dept: PULMONOLOGY | Facility: CLINIC | Age: 80
End: 2020-10-01
Payer: MEDICARE

## 2020-10-01 VITALS
HEIGHT: 60 IN | TEMPERATURE: 98 F | SYSTOLIC BLOOD PRESSURE: 120 MMHG | OXYGEN SATURATION: 100 % | BODY MASS INDEX: 20.03 KG/M2 | DIASTOLIC BLOOD PRESSURE: 64 MMHG | HEART RATE: 95 BPM | WEIGHT: 102 LBS

## 2020-10-01 DIAGNOSIS — J44.9 CHRONIC OBSTRUCTIVE PULMONARY DISEASE, UNSPECIFIED: ICD-10-CM

## 2020-10-01 DIAGNOSIS — Z23 ENCOUNTER FOR IMMUNIZATION: ICD-10-CM

## 2020-10-01 PROBLEM — I10 ESSENTIAL (PRIMARY) HYPERTENSION: Chronic | Status: ACTIVE | Noted: 2020-08-03

## 2020-10-01 PROBLEM — I63.9 CEREBRAL INFARCTION, UNSPECIFIED: Chronic | Status: ACTIVE | Noted: 2020-08-03

## 2020-10-01 PROBLEM — I48.91 UNSPECIFIED ATRIAL FIBRILLATION: Chronic | Status: ACTIVE | Noted: 2020-08-03

## 2020-10-01 PROCEDURE — 90662 IIV NO PRSV INCREASED AG IM: CPT

## 2020-10-01 PROCEDURE — 99214 OFFICE O/P EST MOD 30 MIN: CPT | Mod: 25

## 2020-10-01 PROCEDURE — G0008: CPT

## 2020-10-01 RX ORDER — UMECLIDINIUM BROMIDE AND VILANTEROL TRIFENATATE 62.5; 25 UG/1; UG/1
62.5-25 POWDER RESPIRATORY (INHALATION)
Qty: 1 | Refills: 1 | Status: DISCONTINUED | COMMUNITY
Start: 2018-12-27 | End: 2020-10-01

## 2020-10-01 RX ORDER — GLYCOPYRROLATE AND FORMOTEROL FUMARATE 9; 4.8 UG/1; UG/1
9-4.8 AEROSOL, METERED RESPIRATORY (INHALATION) TWICE DAILY
Qty: 1 | Refills: 3 | Status: ACTIVE | OUTPATIENT
Start: 2020-10-01

## 2020-10-01 NOTE — HISTORY OF PRESENT ILLNESS
[Former] : former [Never] : never [TextBox_4] : History of severe COPD and chronic nausea of unclear etiology.  Admitted to hospital  Had CT of chest negative for pulmonary emboli.  Presents for follow-up.  Uses oxygen at night 2 L.  Some occasional shortness of breath.  Chronic nausea.  Tried on marijuana derivative it was effective but caused unacceptable side effects.\par \par continues to complain of nausea weakness generalized malaise\par \par Wakes up middle of night or early morning with shortness of breath

## 2020-11-07 ENCOUNTER — INPATIENT (INPATIENT)
Facility: HOSPITAL | Age: 80
LOS: 4 days | Discharge: HOME | DRG: 377 | End: 2020-11-12
Attending: INTERNAL MEDICINE | Admitting: INTERNAL MEDICINE
Payer: MEDICARE

## 2020-11-07 VITALS
TEMPERATURE: 99 F | DIASTOLIC BLOOD PRESSURE: 79 MMHG | HEIGHT: 63 IN | OXYGEN SATURATION: 100 % | HEART RATE: 96 BPM | RESPIRATION RATE: 18 BRPM | SYSTOLIC BLOOD PRESSURE: 117 MMHG | WEIGHT: 104.94 LBS

## 2020-11-07 DIAGNOSIS — Z98.890 OTHER SPECIFIED POSTPROCEDURAL STATES: Chronic | ICD-10-CM

## 2020-11-07 DIAGNOSIS — D64.9 ANEMIA, UNSPECIFIED: ICD-10-CM

## 2020-11-07 DIAGNOSIS — I48.19 OTHER PERSISTENT ATRIAL FIBRILLATION: ICD-10-CM

## 2020-11-07 DIAGNOSIS — Z02.9 ENCOUNTER FOR ADMINISTRATIVE EXAMINATIONS, UNSPECIFIED: ICD-10-CM

## 2020-11-07 DIAGNOSIS — N17.9 ACUTE KIDNEY FAILURE, UNSPECIFIED: ICD-10-CM

## 2020-11-07 DIAGNOSIS — D62 ACUTE POSTHEMORRHAGIC ANEMIA: ICD-10-CM

## 2020-11-07 DIAGNOSIS — J44.9 CHRONIC OBSTRUCTIVE PULMONARY DISEASE, UNSPECIFIED: ICD-10-CM

## 2020-11-07 LAB
ALBUMIN SERPL ELPH-MCNC: 3.8 G/DL — SIGNIFICANT CHANGE UP (ref 3.3–5)
ALP SERPL-CCNC: 60 U/L — SIGNIFICANT CHANGE UP (ref 40–120)
ALT FLD-CCNC: <5 U/L — LOW (ref 10–45)
ANION GAP SERPL CALC-SCNC: 9 MMOL/L — SIGNIFICANT CHANGE UP (ref 5–17)
ANISOCYTOSIS BLD QL: SIGNIFICANT CHANGE UP
APTT BLD: 27.6 SEC — SIGNIFICANT CHANGE UP (ref 27.5–35.5)
AST SERPL-CCNC: 13 U/L — SIGNIFICANT CHANGE UP (ref 10–40)
BASOPHILS # BLD AUTO: 0.06 K/UL — SIGNIFICANT CHANGE UP (ref 0–0.2)
BASOPHILS NFR BLD AUTO: 0.9 % — SIGNIFICANT CHANGE UP (ref 0–2)
BILIRUB SERPL-MCNC: 0.5 MG/DL — SIGNIFICANT CHANGE UP (ref 0.2–1.2)
BLD GP AB SCN SERPL QL: POSITIVE — SIGNIFICANT CHANGE UP
BUN SERPL-MCNC: 32 MG/DL — HIGH (ref 7–23)
CALCIUM SERPL-MCNC: 9.1 MG/DL — SIGNIFICANT CHANGE UP (ref 8.4–10.5)
CHLORIDE SERPL-SCNC: 103 MMOL/L — SIGNIFICANT CHANGE UP (ref 96–108)
CO2 SERPL-SCNC: 30 MMOL/L — SIGNIFICANT CHANGE UP (ref 22–31)
CREAT SERPL-MCNC: 1.69 MG/DL — HIGH (ref 0.5–1.3)
ELLIPTOCYTES BLD QL SMEAR: SLIGHT — SIGNIFICANT CHANGE UP
EOSINOPHIL # BLD AUTO: 0.5 K/UL — SIGNIFICANT CHANGE UP (ref 0–0.5)
EOSINOPHIL NFR BLD AUTO: 7.8 % — HIGH (ref 0–6)
GLUCOSE SERPL-MCNC: 109 MG/DL — HIGH (ref 70–99)
HCT VFR BLD CALC: 16.6 % — CRITICAL LOW (ref 34.5–45)
HCT VFR BLD CALC: 27.3 % — LOW (ref 34.5–45)
HGB BLD-MCNC: 4.9 G/DL — CRITICAL LOW (ref 11.5–15.5)
HGB BLD-MCNC: 8.6 G/DL — LOW (ref 11.5–15.5)
HYPOCHROMIA BLD QL: SIGNIFICANT CHANGE UP
INR BLD: 1.33 RATIO — HIGH (ref 0.88–1.16)
LYMPHOCYTES # BLD AUTO: 1 K/UL — SIGNIFICANT CHANGE UP (ref 1–3.3)
LYMPHOCYTES # BLD AUTO: 15.6 % — SIGNIFICANT CHANGE UP (ref 13–44)
MANUAL SMEAR VERIFICATION: SIGNIFICANT CHANGE UP
MCHC RBC-ENTMCNC: 24 PG — LOW (ref 27–34)
MCHC RBC-ENTMCNC: 26 PG — LOW (ref 27–34)
MCHC RBC-ENTMCNC: 29.5 GM/DL — LOW (ref 32–36)
MCHC RBC-ENTMCNC: 31.5 GM/DL — LOW (ref 32–36)
MCV RBC AUTO: 81.4 FL — SIGNIFICANT CHANGE UP (ref 80–100)
MCV RBC AUTO: 82.5 FL — SIGNIFICANT CHANGE UP (ref 80–100)
MICROCYTES BLD QL: SIGNIFICANT CHANGE UP
MONOCYTES # BLD AUTO: 0.22 K/UL — SIGNIFICANT CHANGE UP (ref 0–0.9)
MONOCYTES NFR BLD AUTO: 3.5 % — SIGNIFICANT CHANGE UP (ref 2–14)
NEUTROPHILS # BLD AUTO: 4.62 K/UL — SIGNIFICANT CHANGE UP (ref 1.8–7.4)
NEUTROPHILS NFR BLD AUTO: 72.2 % — SIGNIFICANT CHANGE UP (ref 43–77)
NRBC # BLD: 0 /100 WBCS — SIGNIFICANT CHANGE UP (ref 0–0)
OB PNL STL: POSITIVE
PLAT MORPH BLD: NORMAL — SIGNIFICANT CHANGE UP
PLATELET # BLD AUTO: 301 K/UL — SIGNIFICANT CHANGE UP (ref 150–400)
PLATELET # BLD AUTO: 340 K/UL — SIGNIFICANT CHANGE UP (ref 150–400)
POIKILOCYTOSIS BLD QL AUTO: SLIGHT — SIGNIFICANT CHANGE UP
POLYCHROMASIA BLD QL SMEAR: SLIGHT — SIGNIFICANT CHANGE UP
POTASSIUM SERPL-MCNC: 3.8 MMOL/L — SIGNIFICANT CHANGE UP (ref 3.5–5.3)
POTASSIUM SERPL-SCNC: 3.8 MMOL/L — SIGNIFICANT CHANGE UP (ref 3.5–5.3)
PROT SERPL-MCNC: 6.5 G/DL — SIGNIFICANT CHANGE UP (ref 6–8.3)
PROTHROM AB SERPL-ACNC: 15.7 SEC — HIGH (ref 10.6–13.6)
RBC # BLD: 2.04 M/UL — LOW (ref 3.8–5.2)
RBC # BLD: 3.31 M/UL — LOW (ref 3.8–5.2)
RBC # FLD: 15.7 % — HIGH (ref 10.3–14.5)
RBC # FLD: 16.8 % — HIGH (ref 10.3–14.5)
RBC BLD AUTO: ABNORMAL
RH IG SCN BLD-IMP: NEGATIVE — SIGNIFICANT CHANGE UP
SARS-COV-2 RNA SPEC QL NAA+PROBE: SIGNIFICANT CHANGE UP
SODIUM SERPL-SCNC: 142 MMOL/L — SIGNIFICANT CHANGE UP (ref 135–145)
WBC # BLD: 6.4 K/UL — SIGNIFICANT CHANGE UP (ref 3.8–10.5)
WBC # BLD: 7.89 K/UL — SIGNIFICANT CHANGE UP (ref 3.8–10.5)
WBC # FLD AUTO: 6.4 K/UL — SIGNIFICANT CHANGE UP (ref 3.8–10.5)
WBC # FLD AUTO: 7.89 K/UL — SIGNIFICANT CHANGE UP (ref 3.8–10.5)

## 2020-11-07 PROCEDURE — 86077 PHYS BLOOD BANK SERV XMATCH: CPT

## 2020-11-07 PROCEDURE — 93010 ELECTROCARDIOGRAM REPORT: CPT

## 2020-11-07 PROCEDURE — 99291 CRITICAL CARE FIRST HOUR: CPT | Mod: CS

## 2020-11-07 PROCEDURE — 99223 1ST HOSP IP/OBS HIGH 75: CPT

## 2020-11-07 RX ORDER — ONDANSETRON 8 MG/1
4 TABLET, FILM COATED ORAL EVERY 6 HOURS
Refills: 0 | Status: DISCONTINUED | OUTPATIENT
Start: 2020-11-07 | End: 2020-11-12

## 2020-11-07 RX ORDER — ALBUTEROL 90 UG/1
2 AEROSOL, METERED ORAL EVERY 6 HOURS
Refills: 0 | Status: DISCONTINUED | OUTPATIENT
Start: 2020-11-07 | End: 2020-11-12

## 2020-11-07 RX ORDER — PANTOPRAZOLE SODIUM 20 MG/1
40 TABLET, DELAYED RELEASE ORAL EVERY 12 HOURS
Refills: 0 | Status: DISCONTINUED | OUTPATIENT
Start: 2020-11-07 | End: 2020-11-12

## 2020-11-07 RX ORDER — BUDESONIDE AND FORMOTEROL FUMARATE DIHYDRATE 160; 4.5 UG/1; UG/1
2 AEROSOL RESPIRATORY (INHALATION)
Refills: 0 | Status: DISCONTINUED | OUTPATIENT
Start: 2020-11-07 | End: 2020-11-12

## 2020-11-07 RX ORDER — PANTOPRAZOLE SODIUM 20 MG/1
1 TABLET, DELAYED RELEASE ORAL
Qty: 0 | Refills: 0 | DISCHARGE

## 2020-11-07 RX ORDER — AMLODIPINE BESYLATE 2.5 MG/1
1 TABLET ORAL
Qty: 0 | Refills: 0 | DISCHARGE

## 2020-11-07 RX ORDER — ATORVASTATIN CALCIUM 80 MG/1
10 TABLET, FILM COATED ORAL AT BEDTIME
Refills: 0 | Status: DISCONTINUED | OUTPATIENT
Start: 2020-11-07 | End: 2020-11-12

## 2020-11-07 RX ORDER — METOPROLOL TARTRATE 50 MG
25 TABLET ORAL DAILY
Refills: 0 | Status: DISCONTINUED | OUTPATIENT
Start: 2020-11-07 | End: 2020-11-12

## 2020-11-07 RX ADMIN — ATORVASTATIN CALCIUM 10 MILLIGRAM(S): 80 TABLET, FILM COATED ORAL at 21:35

## 2020-11-07 RX ADMIN — PANTOPRAZOLE SODIUM 40 MILLIGRAM(S): 20 TABLET, DELAYED RELEASE ORAL at 17:35

## 2020-11-07 RX ADMIN — BUDESONIDE AND FORMOTEROL FUMARATE DIHYDRATE 2 PUFF(S): 160; 4.5 AEROSOL RESPIRATORY (INHALATION) at 17:35

## 2020-11-07 NOTE — H&P ADULT - PROBLEM SELECTOR PLAN 2
likely pre-renal 2/2 blood loss  NÉSTOR on CKD3, bl Cr 1.2  - transfusions as above  - monitor cr, renally dose meds

## 2020-11-07 NOTE — ED PROVIDER NOTE - PHYSICAL EXAMINATION
General: well appearing, interactive, well nourished, no apparent distress, ncat  HEENT: EOMI, PERRLA, normal mucosa, normal oropharynx, no lesions on the lips or on oral mucosa, normal external ear  Neck: supple, no lymphadenopathy, full range of motion, no nuchal rigidity  CV: RRR  Resp: lungs CTA b/l  Abd: non-distended, soft, non-tender  rectal: dark stool outside of rectum ( chaperone: Medardo Drummond RN)   : no CVA tenderness  MSK: full range of motion, no cyanosis, no edema, no clubbing, no immobility  Neuro: CN II-XII grossly intact, muscle strength 5/5 in all extremities  Skin: no rashes, skin intact

## 2020-11-07 NOTE — H&P ADULT - PROBLEM SELECTOR PLAN 4
on eliquis at home  - hold eliquis given GIB  - not on rate control meds  - no need for tele, rate controlled

## 2020-11-07 NOTE — ED ADULT NURSE NOTE - NSIMPLEMENTINTERV_GEN_ALL_ED
Implemented All Universal Safety Interventions:  [place] to call system. Call bell, personal items and telephone within reach. Instruct patient to call for assistance. Room bathroom lighting operational. Non-slip footwear when patient is off stretcher. Physically safe environment: no spills, clutter or unnecessary equipment. Stretcher in lowest position, wheels locked, appropriate side rails in place.

## 2020-11-07 NOTE — PROGRESS NOTE ADULT - SUBJECTIVE AND OBJECTIVE BOX
CHIEF COMPLAINT:  anemia    HISTORY OF PRESENT ILLNESS:  patient is a 80-year-old female with a past medical history significant for atrial fibrillation (on anticoagulants) s/p ILR, hypertension, COPD, aortic aneurysm, and hiatal hernia who presents to the emergency room for anemia.  Patient was seen at her primary care physician's office and underwent lab workup which revealed hemoglobin of 5.  Patient was referred to the emergency room.  Patient was seen and examined today without any complaints of chest pain, shortness of breath, or palpitations.  However, patient had complaints of palpitations and shortness of breath during  our office visits.  Patient's implantable loop recorder consistently reveals multiple episodes of atrial fibrillation with RVR.  Patient refused beta blocker agents in the past but is now in agreement.    PAST MEDICAL & SURGICAL HISTORY:  HTN (hypertension)    A-fib    CVA (cerebral vascular accident)    Former smoker    Gallstones    Hyperlipidemia    Dizziness    Stenosis of carotid artery  may 2017    COPD (chronic obstructive pulmonary disease)    No significant past surgical history    H/O carotid endarterectomy            MEDICATIONS:  metoprolol succinate ER 25 milliGRAM(s) Oral daily      ALBUTerol    90 MICROgram(s) HFA Inhaler 2 Puff(s) Inhalation every 6 hours PRN  budesonide  80 MICROgram(s)/formoterol 4.5 MICROgram(s) Inhaler 2 Puff(s) Inhalation two times a day    ondansetron Injectable 4 milliGRAM(s) IV Push every 6 hours PRN    pantoprazole  Injectable 40 milliGRAM(s) IV Push every 12 hours    atorvastatin 10 milliGRAM(s) Oral at bedtime              REVIEW OF SYSTEMS:  CONSTITUTIONAL: No fever, weight loss, or fatigue  EYES: No eye pain, visual disturbances, or discharge  ENMT:  No difficulty hearing, tinnitus, vertigo; No sinus or throat pain  NECK: No pain or stiffness  RESPIRATORY: No cough, wheezing, chills or hemoptysis; No Shortness of Breath  CARDIOVASCULAR: No chest pain, palpitations, passing out, dizziness, or leg swelling  GASTROINTESTINAL: No abdominal or epigastric pain. No nausea, vomiting, or hematemesis; No diarrhea or constipation. No melena or hematochezia.  GENITOURINARY: No dysuria, frequency, hematuria, or incontinence  NEUROLOGICAL: No headaches, memory loss, loss of strength, numbness, or tremors  SKIN: No itching, burning, rashes, or lesions   LYMPH Nodes: No enlarged glands  ENDOCRINE: No heat or cold intolerance; No hair loss  MUSCULOSKELETAL: No joint pain or swelling; No muscle, back, or extremity pain  PSYCHIATRIC: No depression, anxiety, mood swings, or difficulty sleeping  HEME/LYMPH: No easy bruising, or bleeding gums  ALLERY AND IMMUNOLOGIC: No hives or eczema	        PHYSICAL EXAM:  T(C): 36.7 (11-07-20 @ 18:55), Max: 37.1 (11-07-20 @ 10:36)  HR: 89 (11-07-20 @ 18:55) (86 - 99)  BP: 120/54 (11-07-20 @ 18:55) (108/75 - 136/70)  RR: 18 (11-07-20 @ 18:55) (18 - 20)  SpO2: 100% (11-07-20 @ 18:55) (99% - 100%)  Wt(kg): --  I&O's Summary      Appearance: Normal	  HEENT:   Normal oral mucosa, PERRL, EOMI	  Lymphatic: No lymphadenopathy  Cardiovascular: Normal S1 S2, No JVD, No murmurs, No edema  Respiratory: Lungs clear to auscultation	  Psychiatry: A & O x 3, Mood & affect appropriate  Gastrointestinal:  Soft, Non-tender, + BS	  Skin: No rashes, No ecchymoses, No cyanosis	  Neurologic: Non-focal  Extremities: Normal range of motion, No clubbing, cyanosis or edema  Vascular: Peripheral pulses palpable 2+ bilaterally    TELEMETRY: 	    ECG:  	sinus rhythm with frequent premature atrial complexes and nonspecific ST changes.  RADIOLOGY:  OTHER: 	  	  LABS:	 	  reviewed                                    4.9    6.40  )-----------( 340      ( 07 Nov 2020 11:20 )             16.6     11-07    142  |  103  |  32<H>  ----------------------------<  109<H>  3.8   |  30  |  1.69<H>    Ca    9.1      07 Nov 2020 11:20    TPro  6.5  /  Alb  3.8  /  TBili  0.5  /  DBili  x   /  AST  13  /  ALT  <5<L>  /  AlkPhos  60  11-07    proBNP:   Lipid Profile:   HgA1c:   TSH:

## 2020-11-07 NOTE — H&P ADULT - NSHPLABSRESULTS_GEN_ALL_CORE
LABS:                         4.9    6.40  )-----------( 340      ( 07 Nov 2020 11:20 )             16.6     11-07    142  |  103  |  32<H>  ----------------------------<  109<H>  3.8   |  30  |  1.69<H>    Ca    9.1      07 Nov 2020 11:20    TPro  6.5  /  Alb  3.8  /  TBili  0.5  /  DBili  x   /  AST  13  /  ALT  <5<L>  /  AlkPhos  60  11-07    PT/INR - ( 07 Nov 2020 11:20 )   PT: 15.7 sec;   INR: 1.33 ratio         PTT - ( 07 Nov 2020 11:20 )  PTT:27.6 sec

## 2020-11-07 NOTE — H&P ADULT - NSHPREVIEWOFSYSTEMS_GEN_ALL_CORE
Review of Systems:   CONSTITUTIONAL: No fever. +WEIGHT LOSS  EYES: No eye pain, visual disturbances, or discharge  ENMT:  No difficulty hearing, tinnitus, vertigo; No sinus or throat pain  RESPIRATORY: +sob. No cough, wheezing, chills or hemoptysis  CARDIOVASCULAR: No chest pain, palpitations, dizziness, or leg swelling  GASTROINTESTINAL: No abdominal or epigastric pain. No nausea, vomiting, or hematemesis; No diarrhea or constipation. +DARK STOOL  GENITOURINARY: No dysuria, frequency, hematuria, or incontinence  NEUROLOGICAL: No headaches, memory loss, loss of strength, numbness, or tremors  SKIN: No itching, burning, rashes, or lesions   LYMPH NODES: No enlarged glands  ENDOCRINE: No heat or cold intolerance; No hair loss  MUSCULOSKELETAL: No joint pain or swelling; No muscle, back pain  HEME/LYMPH: No easy bruising, or bleeding gums

## 2020-11-07 NOTE — H&P ADULT - ASSESSMENT
81 yo F PMH COPD, chronic hypoxic respiratory failure on 4L NC, HTN, HLD, CVA, AFib on eliquis presenting with months of progressive fatigue and sent in from PCP office for anemia admitted with acute symptomatic blood loss anemia 2/2 suspected GIB.

## 2020-11-07 NOTE — PATIENT PROFILE ADULT - NS PRO AD NO ADVANCE DIRECTIVE
[facility]    Please transfer x [phone] Patrick Contreras RN) until 1 pm today or [id number] anytime. Thank you. No

## 2020-11-07 NOTE — ED ADULT NURSE REASSESSMENT NOTE - NS ED NURSE REASSESS COMMENT FT1
Patient resting in bed comfortably, pending confirmatory blood work/blood transfusion, denies all complaints at this time, vital signs stable. Will continue to monitor. LEATHA Gonsalez

## 2020-11-07 NOTE — ED ADULT NURSE REASSESSMENT NOTE - NS ED NURSE REASSESS COMMENT FT1
Call placed to blood bank regarding blood results/prbcs ordered, states results/blood not available at this time. MD Lujan made aware, states no further orders at this time. Will continue to monitor. LEATHA Gonsalez

## 2020-11-07 NOTE — ED ADULT NURSE REASSESSMENT NOTE - NS ED NURSE REASSESS COMMENT FT1
Patient resting in bed comfortably in no acute distress, blood transfusion infusing, no signs/symptoms of transfusion reaction noted. Sunshine RN

## 2020-11-07 NOTE — PROGRESS NOTE ADULT - ASSESSMENT
assessment:    1. Anemia  2. PAF    plan.    1.  Packed cells, monitor H&H, hold anticoagulants for now, I discussed risk versus benefit in regards to anticoagulation with patient.  Patient verbalized her understanding  2.  Will initiate beta blockers, monitor vitals      Thank you     Plan was discussed with Dr. Zahraa Mantilla    [facility]  Zahraa Mantilla D.O., [facility], KEVIN Vieyra, MSN, AGPCNP-[address], [address]  [place] [address]  [phone]

## 2020-11-07 NOTE — ED PROVIDER NOTE - INTERPRETATION
This writer attempted to contact Jose Luis on 09/19/17      Reason for call rx  and left detailed message.      If patient calls back:   Relay message that rx at front for . Okay for mom to , (read verbatim), document that pt called and close encounter.        Ashley Disla, CMA     abnormal

## 2020-11-07 NOTE — ED PROVIDER NOTE - NS ED ROS FT
GENERAL: fatigue  //             EYES: no change in vision, //             HEENT: no trouble swallowing or speaking, //             CARDIAC: no chest pain, //              PULMONARY: no cough or SOB, //             GI: no abdominal pain, no nausea or no vomiting, no diarrhea or constipation, //             : No changes in urination,  //            SKIN: no rashes,  //            NEURO: no headache,  //             MSK: No joint pain otherwise as HPI or negative. ~Sav Hathaway DO PGY3

## 2020-11-07 NOTE — ED PROVIDER NOTE - ATTENDING CONTRIBUTION TO CARE
Eliel Sutton is a 13 year old male presents today for   Chief Complaint   Patient presents with   • Knee Injury     DOI 9/15     Eliel Sutton is a 13 year old male presenting with right knee injury. Patient states this happened at football last night. States he fell and his leg bend sideways. Pain is on the medial side of the knee. OTC tylenol at 1000.    ALLERGIES:  No Known Allergies    Denies Latex allergy or sensitivity    Medications: medications verified and updated    PCP: Alexx Rich MD  There were no vitals taken for this visit.    Patient here with parents    Up to date with Immunizations: Yes    Patient would like communication of their results via:      Home Phone: [phone] (home)  Okay to leave a message containing results? Yes      Health Maintenance Due   Topic Date Due   • HPV (Male) Vaccine (1 of 3 - Male 3 Dose Series) 11/01/2012   • Influenza Vaccine (1) 09/01/2017      Patient presenting c/o multiple months of malaise, lack of energy, nausea without abdominal pains (primarily around 4-5AM) she reports.  Went to PMD yesterday for evaluation, called today and told that she was severely anemic and to come to the Emergency Department.  Denying chest pains, shortness of breath beyond baseline (COPD on O2 at home), abdominal pains, fevers, chills.  On Xarelto for Afib and prior CVA, denying vaginal bleeding, melena or oral bleeding.  Lives at home alone after her  passed away 3 months ago.    A 14 point review of systems is negative except as in HPI or otherwise documented.    Exam:  General: Patient pale appearing, vital signs within normal limits  HEENT: airway patent with moist mucous membranes  Cardiac: RRR S1/S2 with strong peripheral pulses  Respiratory: lungs clear without respiratory distress  GI: abdomen soft, non tender, non distended  Neuro: no gross neurologic deficits  Skin: warm, well perfused  Psych: depressed mood    Patient presenting with multiple months of progressive malaise, sent by PMD for significant anemia.  Will obtain FOBT for possible GI source of anemia, also labs, EKG, T&S.  Likely admission for treatment of anemia.  Also concerned for possible depression - lives alone after  passed, also reporting had to put both her dogs down recently - could be playing a significant role in some of her symptoms - will discuss with admitting team regarding possible psych consult while inpatient.

## 2020-11-07 NOTE — H&P ADULT - PROBLEM SELECTOR PLAN 1
+occult blood, anemia hgb ~5 from baseline 8-9. ddx: gastric ulcer vs gi malignancy given nausea/wt loss, vs colon malignancy   - 2 units prbc  - goal hgb >7, c/w large bore iv x 2  - ppi iv q12h  - GI following, f/u recs  - npo for now, if repeat CBC after 2 u prbc stable, consider clears in prep for poss EGD monday?  - hold eliquis and asa, will need to consider resuming eliquis given CVA hx pending stablization of CBC and GOC discussion  - check posttxn cbc tonight  - F/U iron studies, B12, Folate

## 2020-11-07 NOTE — ED PROVIDER NOTE - PMH
A-fib    Cerebrovascular accident (CVA)    COPD (chronic obstructive pulmonary disease)    COPD (chronic obstructive pulmonary disease)    CVA (cerebral vascular accident)    Dizziness    Former smoker    Gallstones    HTN (hypertension)    Hyperlipidemia    Hypertension    Stenosis of carotid artery  may 2017

## 2020-11-07 NOTE — ED PROVIDER NOTE - OBJECTIVE STATEMENT
79 yo f pmh htn, hld, copd on home o2, cva, sent from pcp due to anemia. reports has been feeling unwell for several months, fatigued, and intermittently nauseous. no acute sx in ed. reports low hg from pcp blood draw. unknown if she has had darky/tarry stools or brbpr.

## 2020-11-07 NOTE — H&P ADULT - HISTORY OF PRESENT ILLNESS
79 yo F PMH COPD, chronic hypoxic respiratory failure on 4L NC, HTN, HLD, CVA, AFib on eliquis presenting with months of progressive fatigue and sent in from PCP office for anemia. Pt reports in the past few months, she has noticed increasing weakness and fatigue. She has had some nausea without emesis and reports decreased appetite overall, and 8 lb weight loss in past month. During this time, she has also noticed darker stools, but denies seeing any BRBPR, or blood loss in urine, sputum. Denies any hx of endoscopy or screening colonoscopy in the past. No hx of bleeding per pt. +SOB while ambulating. No LE edema, f/chills, dysuria, diarrhea.    In the ED, VSS.   2 units of PRBC ordered in the ED

## 2020-11-07 NOTE — ED ADULT NURSE NOTE - OBJECTIVE STATEMENT
81yo female presents to er biba alert and oriented x 3 with complaints of nausea/general malaise, denies cough, fevers, chills, abdominal pain, diarrhea, blood in stool/urine and all other complaints. Respirations even and nonlabored, breathing spontaneously on room air. Abdomen soft and nontender on palpation, labs collected, pending md evaluation. Will continue to monitor. LEATHA Gonsalez 79yo female presents to er biba alert and oriented x 3 with complaints of nausea/general malaise, denies cough, fevers, chills, abdominal pain, diarrhea, blood in stool/urine and all other complaints. States her pcp recently told her hgb 4.9. Respirations even and nonlabored, breathing spontaneously on room air. Abdomen soft and nontender on palpation, labs collected, pending md evaluation. Will continue to monitor. LEATHA Gonsalez

## 2020-11-07 NOTE — ED PROVIDER NOTE - CLINICAL SUMMARY MEDICAL DECISION MAKING FREE TEXT BOX
brad pgy3: 79 yo f pw several months of worsening fatigue. arrives hds, otherwise stable appearing. will repeat labs, transfuse if necessary.

## 2020-11-07 NOTE — PROGRESS NOTE ADULT - SUBJECTIVE AND OBJECTIVE BOX
Patient is a 80y Female     Patient is a 80y old  Female who presents with a chief complaint of anemia (07 Nov 2020 14:42)      HPI:  81 yo F PMH COPD, chronic hypoxic respiratory failure on 4L NC, HTN, HLD, CVA, AFib on eliquis presenting with months of progressive fatigue and sent in from PCP office for anemia. Pt reports in the past few months, she has noticed increasing weakness and fatigue. She has had some nausea without emesis and reports decreased appetite overall, and 8 lb weight loss in past month. During this time, she has also noticed darker stools, but denies seeing any BRBPR, or blood loss in urine, sputum. Denies any hx of endoscopy or screening colonoscopy in the past. No hx of bleeding per pt. +SOB while ambulating. No LE edema, f/chills, dysuria, diarrhea.    In the ED, VSS.   2 units of PRBC ordered in the ED (07 Nov 2020 14:42)      PAST MEDICAL & SURGICAL HISTORY:  HTN (hypertension)    A-fib    CVA (cerebral vascular accident)    Former smoker    Gallstones    Hyperlipidemia    Dizziness    Stenosis of carotid artery  may 2017    COPD (chronic obstructive pulmonary disease)    No significant past surgical history    H/O carotid endarterectomy        MEDICATIONS  (STANDING):  pantoprazole  Injectable 40 milliGRAM(s) IV Push every 12 hours      Allergies    No Known Allergies    Intolerances        SOCIAL HISTORY:  Denies ETOh,Smoking,     FAMILY HISTORY:  No pertinent family history in first degree relatives        REVIEW OF SYSTEMS:    CONSTITUTIONAL: No weakness, fevers or chills  EYES/ENT: No visual changes;  No vertigo or throat pain   NECK: No pain or stiffness      LABS:                        4.9    6.40  )-----------( 340      ( 07 Nov 2020 11:20 )             16.6     11-07    142  |  103  |  32<H>  ----------------------------<  109<H>  3.8   |  30  |  1.69<H>    Ca    9.1      07 Nov 2020 11:20    TPro  6.5  /  Alb  3.8  /  TBili  0.5  /  DBili  x   /  AST  13  /  ALT  <5<L>  /  AlkPhos  60  11-07          RADIOLOGY & ADDITIONAL STUDIES:

## 2020-11-07 NOTE — H&P ADULT - NSICDXPASTMEDICALHX_GEN_ALL_CORE_FT
PAST MEDICAL HISTORY:  A-fib     COPD (chronic obstructive pulmonary disease)     CVA (cerebral vascular accident)     Dizziness     Former smoker     Gallstones     HTN (hypertension)     Hyperlipidemia     Stenosis of carotid artery may 2017

## 2020-11-08 LAB
ANION GAP SERPL CALC-SCNC: 8 MMOL/L — SIGNIFICANT CHANGE UP (ref 5–17)
APPEARANCE UR: CLEAR — SIGNIFICANT CHANGE UP
BACTERIA # UR AUTO: NEGATIVE — SIGNIFICANT CHANGE UP
BASOPHILS # BLD AUTO: 0.03 K/UL — SIGNIFICANT CHANGE UP (ref 0–0.2)
BASOPHILS NFR BLD AUTO: 0.5 % — SIGNIFICANT CHANGE UP (ref 0–2)
BILIRUB UR-MCNC: NEGATIVE — SIGNIFICANT CHANGE UP
BUN SERPL-MCNC: 26 MG/DL — HIGH (ref 7–23)
CALCIUM SERPL-MCNC: 8.5 MG/DL — SIGNIFICANT CHANGE UP (ref 8.4–10.5)
CHLORIDE SERPL-SCNC: 105 MMOL/L — SIGNIFICANT CHANGE UP (ref 96–108)
CO2 SERPL-SCNC: 29 MMOL/L — SIGNIFICANT CHANGE UP (ref 22–31)
COLOR SPEC: YELLOW — SIGNIFICANT CHANGE UP
CREAT ?TM UR-MCNC: 156 MG/DL — SIGNIFICANT CHANGE UP
CREAT SERPL-MCNC: 1.57 MG/DL — HIGH (ref 0.5–1.3)
DIFF PNL FLD: NEGATIVE — SIGNIFICANT CHANGE UP
EOSINOPHIL # BLD AUTO: 0.46 K/UL — SIGNIFICANT CHANGE UP (ref 0–0.5)
EOSINOPHIL NFR BLD AUTO: 7 % — HIGH (ref 0–6)
EPI CELLS # UR: 2 /HPF — SIGNIFICANT CHANGE UP
GLUCOSE SERPL-MCNC: 95 MG/DL — SIGNIFICANT CHANGE UP (ref 70–99)
GLUCOSE UR QL: NEGATIVE — SIGNIFICANT CHANGE UP
HCT VFR BLD CALC: 26.5 % — LOW (ref 34.5–45)
HGB BLD-MCNC: 8.3 G/DL — LOW (ref 11.5–15.5)
HYALINE CASTS # UR AUTO: 6 /LPF — HIGH (ref 0–2)
IMM GRANULOCYTES NFR BLD AUTO: 0.3 % — SIGNIFICANT CHANGE UP (ref 0–1.5)
KETONES UR-MCNC: NEGATIVE — SIGNIFICANT CHANGE UP
LEUKOCYTE ESTERASE UR-ACNC: ABNORMAL
LYMPHOCYTES # BLD AUTO: 1.6 K/UL — SIGNIFICANT CHANGE UP (ref 1–3.3)
LYMPHOCYTES # BLD AUTO: 24.4 % — SIGNIFICANT CHANGE UP (ref 13–44)
MAGNESIUM SERPL-MCNC: 2 MG/DL — SIGNIFICANT CHANGE UP (ref 1.6–2.6)
MCHC RBC-ENTMCNC: 25.9 PG — LOW (ref 27–34)
MCHC RBC-ENTMCNC: 31.3 GM/DL — LOW (ref 32–36)
MCV RBC AUTO: 82.6 FL — SIGNIFICANT CHANGE UP (ref 80–100)
MONOCYTES # BLD AUTO: 0.55 K/UL — SIGNIFICANT CHANGE UP (ref 0–0.9)
MONOCYTES NFR BLD AUTO: 8.4 % — SIGNIFICANT CHANGE UP (ref 2–14)
NEUTROPHILS # BLD AUTO: 3.89 K/UL — SIGNIFICANT CHANGE UP (ref 1.8–7.4)
NEUTROPHILS NFR BLD AUTO: 59.4 % — SIGNIFICANT CHANGE UP (ref 43–77)
NITRITE UR-MCNC: NEGATIVE — SIGNIFICANT CHANGE UP
NRBC # BLD: 0 /100 WBCS — SIGNIFICANT CHANGE UP (ref 0–0)
PH UR: 6 — SIGNIFICANT CHANGE UP (ref 5–8)
PHOSPHATE SERPL-MCNC: 2.9 MG/DL — SIGNIFICANT CHANGE UP (ref 2.5–4.5)
PLATELET # BLD AUTO: 293 K/UL — SIGNIFICANT CHANGE UP (ref 150–400)
POTASSIUM SERPL-MCNC: 3.7 MMOL/L — SIGNIFICANT CHANGE UP (ref 3.5–5.3)
POTASSIUM SERPL-SCNC: 3.7 MMOL/L — SIGNIFICANT CHANGE UP (ref 3.5–5.3)
PROT UR-MCNC: ABNORMAL
RBC # BLD: 3.21 M/UL — LOW (ref 3.8–5.2)
RBC # FLD: 15.3 % — HIGH (ref 10.3–14.5)
RBC CASTS # UR COMP ASSIST: 2 /HPF — SIGNIFICANT CHANGE UP (ref 0–4)
SODIUM SERPL-SCNC: 142 MMOL/L — SIGNIFICANT CHANGE UP (ref 135–145)
SODIUM UR-SCNC: 69 MMOL/L — SIGNIFICANT CHANGE UP
SP GR SPEC: 1.02 — SIGNIFICANT CHANGE UP (ref 1.01–1.02)
UROBILINOGEN FLD QL: NEGATIVE — SIGNIFICANT CHANGE UP
WBC # BLD: 6.55 K/UL — SIGNIFICANT CHANGE UP (ref 3.8–10.5)
WBC # FLD AUTO: 6.55 K/UL — SIGNIFICANT CHANGE UP (ref 3.8–10.5)
WBC UR QL: 11 /HPF — HIGH (ref 0–5)

## 2020-11-08 PROCEDURE — 99223 1ST HOSP IP/OBS HIGH 75: CPT

## 2020-11-08 PROCEDURE — 74176 CT ABD & PELVIS W/O CONTRAST: CPT | Mod: 26

## 2020-11-08 RX ORDER — SODIUM CHLORIDE 9 MG/ML
1000 INJECTION, SOLUTION INTRAVENOUS
Refills: 0 | Status: DISCONTINUED | OUTPATIENT
Start: 2020-11-08 | End: 2020-11-10

## 2020-11-08 RX ORDER — SENNA PLUS 8.6 MG/1
2 TABLET ORAL AT BEDTIME
Refills: 0 | Status: DISCONTINUED | OUTPATIENT
Start: 2020-11-08 | End: 2020-11-12

## 2020-11-08 RX ADMIN — Medication 25 MILLIGRAM(S): at 05:05

## 2020-11-08 RX ADMIN — SODIUM CHLORIDE 50 MILLILITER(S): 9 INJECTION, SOLUTION INTRAVENOUS at 11:25

## 2020-11-08 RX ADMIN — BUDESONIDE AND FORMOTEROL FUMARATE DIHYDRATE 2 PUFF(S): 160; 4.5 AEROSOL RESPIRATORY (INHALATION) at 05:05

## 2020-11-08 RX ADMIN — SENNA PLUS 2 TABLET(S): 8.6 TABLET ORAL at 22:18

## 2020-11-08 RX ADMIN — PANTOPRAZOLE SODIUM 40 MILLIGRAM(S): 20 TABLET, DELAYED RELEASE ORAL at 17:13

## 2020-11-08 RX ADMIN — ATORVASTATIN CALCIUM 10 MILLIGRAM(S): 80 TABLET, FILM COATED ORAL at 22:18

## 2020-11-08 RX ADMIN — SODIUM CHLORIDE 50 MILLILITER(S): 9 INJECTION, SOLUTION INTRAVENOUS at 22:18

## 2020-11-08 RX ADMIN — BUDESONIDE AND FORMOTEROL FUMARATE DIHYDRATE 2 PUFF(S): 160; 4.5 AEROSOL RESPIRATORY (INHALATION) at 17:13

## 2020-11-08 RX ADMIN — PANTOPRAZOLE SODIUM 40 MILLIGRAM(S): 20 TABLET, DELAYED RELEASE ORAL at 05:05

## 2020-11-08 NOTE — PROGRESS NOTE ADULT - SUBJECTIVE AND OBJECTIVE BOX
CHIEF COMPLAINT:  comfortable      MEDICATIONS:  metoprolol succinate ER 25 milliGRAM(s) Oral daily      ALBUTerol    90 MICROgram(s) HFA Inhaler 2 Puff(s) Inhalation every 6 hours PRN  budesonide  80 MICROgram(s)/formoterol 4.5 MICROgram(s) Inhaler 2 Puff(s) Inhalation two times a day    ondansetron Injectable 4 milliGRAM(s) IV Push every 6 hours PRN    bisacodyl 5 milliGRAM(s) Oral every 12 hours PRN  pantoprazole  Injectable 40 milliGRAM(s) IV Push every 12 hours  senna 2 Tablet(s) Oral at bedtime    atorvastatin 10 milliGRAM(s) Oral at bedtime    dextrose 5% + sodium chloride 0.45%. 1000 milliLiter(s) IV Continuous <Continuous>          Allergies    No Known Allergies    Intolerances    	        PHYSICAL EXAM:  T(C): 36.6 (11-08-20 @ 13:30), Max: 37.1 (11-07-20 @ 15:36)  HR: 87 (11-08-20 @ 13:30) (79 - 93)  BP: 157/68 (11-08-20 @ 13:30) (115/56 - 157/68)  RR: 16 (11-08-20 @ 13:30) (16 - 20)  SpO2: 98% (11-08-20 @ 13:30) (95% - 100%)  Wt(kg): --  I&O's Summary    07 Nov 2020 07:01  -  08 Nov 2020 07:00  --------------------------------------------------------  IN: 0 mL / OUT: 250 mL / NET: -250 mL    08 Nov 2020 07:01  -  08 Nov 2020 15:21  --------------------------------------------------------  IN: 0 mL / OUT: 0 mL / NET: 0 mL        Appearance: Normal	  Cardiovascular: Normal S1 S2, No JVD, No murmurs, No edema  Respiratory: Lungs clear to auscultation	  Psychiatry: A & O x 3, Mood & affect appropriate  Gastrointestinal:  Soft, Non-tender, + BS	  Skin: No rashes, No ecchymoses, No cyanosis	  Neurologic: Non-focal  Extremities: Normal range of motion, No clubbing, cyanosis or edema      TELEMETRY: 	nonmoniotred    	  LABS:	 	reviewed                                      8.3    6.55  )-----------( 293      ( 08 Nov 2020 04:55 )             26.5     11-08    142  |  105  |  26<H>  ----------------------------<  95  3.7   |  29  |  1.57<H>    Ca    8.5      08 Nov 2020 04:55  Phos  2.9     11-08  Mg     2.0     11-08    TPro  6.5  /  Alb  3.8  /  TBili  0.5  /  DBili  x   /  AST  13  /  ALT  <5<L>  /  AlkPhos  60  11-07    proBNP:   Lipid Profile:   HgA1c:   TSH:

## 2020-11-08 NOTE — PHYSICAL THERAPY INITIAL EVALUATION ADULT - ADDITIONAL COMMENTS
contd from above:. She has had some nausea without emesis and reports decreased appetite overall, and 8 lb weight loss in past month. During this time, she has also noticed darker stools, but denies seeing any BRBPR, or blood loss in urine, sputum. Denies any hx of endoscopy or screening colonoscopy in the past. No hx of bleeding per pt. +SOB while ambulating. No LE edema, f/chills, dysuria, diarrhea. contd from above:. She has had some nausea without emesis and reports decreased appetite overall, and 8 lb weight loss in past month. During this time, she has also noticed darker stools, but denies seeing any BRBPR, or blood loss in urine, sputum. Denies any hx of endoscopy or screening colonoscopy in the past. No hx of bleeding per pt. +SOB while ambulating. No LE edema, f/chills, dysuria, diarrhea.    social history: pt lives with daughter in private house, no stairs to negotiate. pt amb independently with rolling walker, owns wheelchair for long distances. daughter assists with ADLs

## 2020-11-08 NOTE — CONSULT NOTE ADULT - ATTENDING COMMENTS
[facility] NEPHROLOGY  Luther Frias M.D.  Derrick Dee D.O.  Constanza Richter M.D.  Alize Garcia, MSN, ANP-C    Telephone: [phone]  Facsimile: [phone]    [id number] [place] [address]
No

## 2020-11-08 NOTE — CONSULT NOTE ADULT - SUBJECTIVE AND OBJECTIVE BOX
[facility] NEPHROLOGY- CONSULTATION NOTE    80y Female with history of below presents with anemia on outpatient labs. Nephrology consulted for elevated Scr.    Patient appears to have a history of CKD-3b with baseline Scr 1-1.2 as per prior labs. Patient not on any ACE-I/ARB, diuretics or NSAIDS as an outpatient. Patient does admit to nausea with decreased PO intake over 2 weeks.    REVIEW OF SYSTEMS:  Gen: no changes in weight  HEENT: no rhinorrhea  Neck: no sore throat  Cards: no chest pain  Resp: no dyspnea  GI: + nausea, no vomiting or diarrhea, + decreased PO intake  : no dysuria or hematuria  Vascular: no LE edema  Derm: no rashes  Neuro: no numbness/tingling    No Known Allergies      Home Medications Reviewed  Hospital Medications:   MEDICATIONS  (STANDING):  atorvastatin 10 milliGRAM(s) Oral at bedtime  budesonide  80 MICROgram(s)/formoterol 4.5 MICROgram(s) Inhaler 2 Puff(s) Inhalation two times a day  metoprolol succinate ER 25 milliGRAM(s) Oral daily  pantoprazole  Injectable 40 milliGRAM(s) IV Push every 12 hours  senna 2 Tablet(s) Oral at bedtime      PAST MEDICAL & SURGICAL HISTORY:  HTN (hypertension)    A-fib    CVA (cerebral vascular accident)    Former smoker    Gallstones    Hyperlipidemia    Dizziness    Stenosis of carotid artery  may 2017    COPD (chronic obstructive pulmonary disease)    No significant past surgical history    H/O carotid endarterectomy        FAMILY HISTORY:  No pertinent family history in first degree relatives        SOCIAL HISTORY:  Denies toxic substance use     VITALS:  T(F): 98 (11-08-20 @ 09:14), Max: 98.8 (11-07-20 @ 15:58)  HR: 79 (11-08-20 @ 09:14)  BP: 130/86 (11-08-20 @ 10:08)  RR: 16 (11-08-20 @ 09:14)  SpO2: 95% (11-08-20 @ 10:08)  Wt(kg): --    11-07 @ 07:01  -  11-08 @ 07:00  --------------------------------------------------------  IN: 0 mL / OUT: 250 mL / NET: -250 mL    11-08 @ 07:01  -  11-08 @ 10:28  --------------------------------------------------------  IN: 0 mL / OUT: 0 mL / NET: 0 mL      Height (cm): 160 (11-07 @ 10:36)  Weight (kg): 47.6 (11-07 @ 10:36)  BMI (kg/m2): 18.6 (11-07 @ 10:36)  BSA (m2): 1.47 (11-07 @ 10:36)      PHYSICAL EXAM:  Gen: NAD, calm  HEENT: MMM  Neck: no JVD  Cards: RRR, +S1/S2, no M/G/R  Resp: CTA B/L  GI: soft, NT/ND, NABS  : no CVA tenderness  Vascular: no LE edema B/L  Derm: no rashes  Neuro: non-focal      LABS:  11-08    142  |  105  |  26<H>  ----------------------------<  95  3.7   |  29  |  1.57<H>    Ca    8.5      08 Nov 2020 04:55  Phos  2.9     11-08  Mg     2.0     11-08    TPro  6.5  /  Alb  3.8  /  TBili  0.5  /  DBili      /  AST  13  /  ALT  <5<L>  /  AlkPhos  60  11-07    Creatinine Trend: 1.57 <--, 1.69 <--                        8.3    6.55  )-----------( 293      ( 08 Nov 2020 04:55 )             26.5     Urine Studies:

## 2020-11-08 NOTE — PROGRESS NOTE ADULT - SUBJECTIVE AND OBJECTIVE BOX
Patient is a 80y Female     Patient is a 80y old  Female who presents with a chief complaint of anemia (08 Nov 2020 10:40)      HPI:  81 yo F PMH COPD, chronic hypoxic respiratory failure on 4L NC, HTN, HLD, CVA, AFib on eliquis presenting with months of progressive fatigue and sent in from PCP office for anemia. Pt reports in the past few months, she has noticed increasing weakness and fatigue. She has had some nausea without emesis and reports decreased appetite overall, and 8 lb weight loss in past month. During this time, she has also noticed darker stools, but denies seeing any BRBPR, or blood loss in urine, sputum. Denies any hx of endoscopy or screening colonoscopy in the past. No hx of bleeding per pt. +SOB while ambulating. No LE edema, f/chills, dysuria, diarrhea.    In the ED, VSS.   2 units of PRBC ordered in the ED (07 Nov 2020 14:42)      PAST MEDICAL & SURGICAL HISTORY:  HTN (hypertension)    A-fib    CVA (cerebral vascular accident)    Former smoker    Gallstones    Hyperlipidemia    Dizziness    Stenosis of carotid artery  may 2017    COPD (chronic obstructive pulmonary disease)    No significant past surgical history    H/O carotid endarterectomy        MEDICATIONS  (STANDING):  atorvastatin 10 milliGRAM(s) Oral at bedtime  budesonide  80 MICROgram(s)/formoterol 4.5 MICROgram(s) Inhaler 2 Puff(s) Inhalation two times a day  dextrose 5% + sodium chloride 0.45%. 1000 milliLiter(s) (50 mL/Hr) IV Continuous <Continuous>  metoprolol succinate ER 25 milliGRAM(s) Oral daily  pantoprazole  Injectable 40 milliGRAM(s) IV Push every 12 hours  senna 2 Tablet(s) Oral at bedtime      Allergies    No Known Allergies    Intolerances        SOCIAL HISTORY:  Denies ETOh,Smoking,     FAMILY HISTORY:  No pertinent family history in first degree relatives        REVIEW OF SYSTEMS:    CONSTITUTIONAL: No weakness, fevers or chills  EYES/ENT: No visual changes;  No vertigo or throat pain   NECK: No pain or stiffness  RESPIRATORY: No cough, wheezing, hemoptysis; No shortness of breath  CARDIOVASCULAR: No chest pain or palpitations  GASTROINTESTINAL: No abdominal or epigastric pain. No nausea, vomiting, or hematemesis; No diarrhea or constipation. No melena or hematochezia.  GENITOURINARY: No dysuria, frequency or hematuria  NEUROLOGICAL: No numbness or weakness  SKIN: No itching, burning, rashes, or lesions   All other review of systems is negative unless indicated above.    VITAL:  T(C): , Max: 37.1 (11-07-20 @ 15:36)  T(F): , Max: 98.8 (11-07-20 @ 15:58)  HR: 79 (11-08-20 @ 09:14)  BP: 130/86 (11-08-20 @ 10:08)  BP(mean): --  RR: 16 (11-08-20 @ 09:14)  SpO2: 95% (11-08-20 @ 10:08)  Wt(kg): --    I and O's:    11-07 @ 07:01  -  11-08 @ 07:00  --------------------------------------------------------  IN: 0 mL / OUT: 250 mL / NET: -250 mL    11-08 @ 07:01  -  11-08 @ 10:48  --------------------------------------------------------  IN: 0 mL / OUT: 0 mL / NET: 0 mL          PHYSICAL EXAM:    Constitutional: NAD  HEENT: PERRLA,   Neck: No JVD  Respiratory: CTA B/L  Cardiovascular: S1 and S2  Gastrointestinal: BS+, soft, NT/ND  Extremities: No peripheral edema  Neurological: A/O x 3, no focal deficits  Psychiatric: Normal mood, normal affect  : No [place]  Skin: No rashes  Access: Not applicable  Back: No CVA tenderness    LABS:                        8.3    6.55  )-----------( 293      ( 08 Nov 2020 04:55 )             26.5     11-08    142  |  105  |  26<H>  ----------------------------<  95  3.7   |  29  |  1.57<H>    Ca    8.5      08 Nov 2020 04:55  Phos  2.9     11-08  Mg     2.0     11-08    TPro  6.5  /  Alb  3.8  /  TBili  0.5  /  DBili  x   /  AST  13  /  ALT  <5<L>  /  AlkPhos  60  11-07          RADIOLOGY & ADDITIONAL STUDIES:

## 2020-11-08 NOTE — PHYSICAL THERAPY INITIAL EVALUATION ADULT - DISCHARGE DISPOSITION, PT EVAL
for balance, gait and strengthening and assistance for functional activities, pt owns rolling walker, states daughter available to assist/home w/ home PT

## 2020-11-08 NOTE — PROGRESS NOTE ADULT - ASSESSMENT
79 yo F PMH COPD, chronic hypoxic respiratory failure on 4L NC, HTN, HLD, CVA, AFib on eliquis presenting with months of progressive fatigue and sent in from PCP office for anemia admitted with acute symptomatic blood loss anemia 2/2 suspected GIB.

## 2020-11-08 NOTE — CONSULT NOTE ADULT - SUBJECTIVE AND OBJECTIVE BOX
PULMONARY CONSULT NOTE      SULMA MEI  MRN-[id number]    Patient is a 80y old  Female who presents with a chief complaint of anemia (2020 10:28)      HISTORY OF PRESENT ILLNESS:  81 yo with COPD- last saw Dr Cristobal- 10/2020 switched from anoro to Bevespi; on ; last pfts- severe disease 2020  admitted for anemia      Allergies    No Known Allergies    Intolerances        PAST MEDICAL & SURGICAL HISTORY:  HTN (hypertension)    A-fib    CVA (cerebral vascular accident)    Former smoker    Gallstones    Hyperlipidemia    Dizziness    Stenosis of carotid artery  may 2017    COPD (chronic obstructive pulmonary disease)    No significant past surgical history    H/O carotid endarterectomy            FAMILY HISTORY:  No pertinent family history in first degree relatives      Prescriptions:  ondansetron 4 mg oral tablet, disintegratin tab(s) orally every 8 hours, As Needed for nausea    Note:Last filled in feb. (2020 14:44)      SOCIAL HISTORY  Smoking History:     REVIEW OF SYSTEMS:    CONSTITUTIONAL:  No fevers, chills, sweats    HEENT:  Eyes:  No diplopia or blurred vision. ENT:  No earache, sore throat or runny nose.    CARDIOVASCULAR:  No pressure, squeezing, tightness, or heaviness about the chest; no palpitations.    RESPIRATORY:  Per HPI    GASTROINTESTINAL:  No abdominal pain, nausea, vomiting or diarrhea.    GENITOURINARY:  No dysuria, frequency or urgency.    NEUROLOGIC:  No paresthesias, fasciculations, seizures or weakness.    PSYCHIATRIC:  No disorder of thought or mood.    Vital Signs Last 24 Hrs  T(C): 36.7 (2020 09:14), Max: 37.1 (2020 15:36)  T(F): 98 (2020 09:14), Max: 98.8 (2020 15:58)  HR: 79 (2020 09:14) (79 - 99)  BP: 130/86 (2020 10:08) (108/75 - 138/78)  BP(mean): --  RR: 16 (2020 09:14) (16 - 20)  SpO2: 95% (2020 10:08) (95% - 100%)    PHYSICAL EXAMINATION:    GENERAL: The patient is a well-developed, well-nourished _____in no apparent distress.     HEENT: Head is normocephalic and atraumatic. Extraocular muscles are intact. Mucous membranes are moist.     NECK: Supple.     LUNGS: Clear to auscultation without wheezing, rales, or rhonchi. Respirations unlabored    HEART: Regular rate and rhythm without murmur.    ABDOMEN: Soft, nontender, and nondistended.  No hepatosplenomegaly is noted.    EXTREMITIES: Without any cyanosis, clubbing, rash, lesions or edema.    NEUROLOGIC: Grossly intact      MEDICATIONS  (STANDING):  atorvastatin 10 milliGRAM(s) Oral at bedtime  budesonide  80 MICROgram(s)/formoterol 4.5 MICROgram(s) Inhaler 2 Puff(s) Inhalation two times a day  dextrose 5% + sodium chloride 0.45%. 1000 milliLiter(s) (50 mL/Hr) IV Continuous <Continuous>  metoprolol succinate ER 25 milliGRAM(s) Oral daily  pantoprazole  Injectable 40 milliGRAM(s) IV Push every 12 hours  senna 2 Tablet(s) Oral at bedtime      MEDICATIONS  (PRN):  ALBUTerol    90 MICROgram(s) HFA Inhaler 2 Puff(s) Inhalation every 6 hours PRN Shortness of Breath and/or Wheezing  bisacodyl 5 milliGRAM(s) Oral every 12 hours PRN Constipation  ondansetron Injectable 4 milliGRAM(s) IV Push every 6 hours PRN Nausea and/or Vomiting        LABS:   CBC Full  -  ( 2020 04:55 )  WBC Count : 6.55 K/uL  RBC Count : 3.21 M/uL  Hemoglobin : 8.3 g/dL  Hematocrit : 26.5 %  Platelet Count - Automated : 293 K/uL  Mean Cell Volume : 82.6 fl  Mean Cell Hemoglobin : 25.9 pg  Mean Cell Hemoglobin Concentration : 31.3 gm/dL  Auto Neutrophil # : 3.89 K/uL  Auto Lymphocyte # : 1.60 K/uL  Auto Monocyte # : 0.55 K/uL  Auto Eosinophil # : 0.46 K/uL  Auto Basophil # : 0.03 K/uL  Auto Neutrophil % : 59.4 %  Auto Lymphocyte % : 24.4 %  Auto Monocyte % : 8.4 %  Auto Eosinophil % : 7.0 %  Auto Basophil % : 0.5 %    PT/INR - ( 2020 11:20 )   PT: 15.7 sec;   INR: 1.33 ratio         PTT - ( 2020 11:20 )  PTT:27.6 sec      142  |  105  |  26<H>  ----------------------------<  95  3.7   |  29  |  1.57<H>    Ca    8.5      2020 04:55  Phos  2.9       Mg     2.0         TPro  6.5  /  Alb  3.8  /  TBili  0.5  /  DBili  x   /  AST  13  /  ALT  <5<L>  /  AlkPhos  60                  RADIOLOGY & ADDITIONAL STUDIES:  ra< from: CT Angio Chest w/ IV Cont (20 @ 20:00) >     EXAM:  CT ANGIO CHEST (W)AW IC                          PROCEDURE DATE:  2020          INTERPRETATION:  CLINICAL INFORMATION: Shortness of breath    COMPARISON: None.    PROCEDURE:  CT Angiography of the Chest.  48 ml of Omnipaque 350 was injected intravenously.  Sagittal and coronal reformats were performed as well as 3D (MIP) reconstructions.    FINDINGS:    LUNGS AND AIRWAYS: Patent central airways.  Biapical pleural parenchymal scarring. Mild centrilobular emphysema. No lung mass, suspicious nodule, or lung consolidation.  PLEURA: No pleural effusion.  MEDIASTINUM AND TARA: No lymphadenopathy.  VESSELS: Adequate pulmonary arterial opacification. Evaluation mildly degraded by respiratory motion. No pulmonary embolism. Main pulmonary artery is not dilated. Atherosclerotic disease of the thoracic aorta with mild aneurysmal dilatation. Ascending thoracic aorta measures up to 4 cm. Distal descending thoracic aorta measures up to 3.4 cm. Irregular peripheral thrombus within the descending thoracic aorta. Coronary artery atherosclerotic calcifications.  HEART: Heart size is normal. Small pericardial effusion.  CHEST WALL AND LOWER NECK: Within normal limits.  VISUALIZED UPPER ABDOMEN: Cholecystectomy clips. Mild thickening of the adrenal glands.  BONES: Multiple old right posterior rib fractures. Indeterminate age mild compression fractures of T2, T8, and T10. Degenerative changes of the spine.    IMPRESSION:  No pulmonary embolism.    No acute parenchymal or pleural lung disease.    Biapical pleural parenchymal scarring.    Atherosclerotic disease of the thoracic aorta with mild aneurysmal dilatation; ascending thoracic aorta measures up to 4 cm and distal descending thoracic aorta measures up to 3.4 cm. Irregular peripheral thrombus within the descending thoracic aorta.    Indeterminate age mild compression fractures of T2, T8, and T10.                MIO HUERTA D.O. ATTENDING RADIOLOGIST  This document has been electronically signed. Aug  2 2020  9:01PM    < end of copied text >    ECHO:    ASSESSMENT:    PLAN:      Thank you for allowing me to participate in the care of this patient.  Please feel free to call me for any questions/concerns.      Cassidy Flores DO  [facility] Pulmonary/Sleep Medicine  [phone] PULMONARY CONSULT NOTE      SULMA MEI  MRN-[id number]    Patient is a 80y old  Female who presents with a chief complaint of anemia (2020 10:28)      HISTORY OF PRESENT ILLNESS:  81 yo with COPD- last saw Dr Cristobal- 10/2020 switched from anoro to Bevespi; on ; last pfts- severe disease 2020  admitted for anemia    she is on 2L comfortable in her chair. feels weak.  has a chronic cough- no change from baseline      Allergies    No Known Allergies    Intolerances        PAST MEDICAL & SURGICAL HISTORY:  HTN (hypertension)    A-fib    CVA (cerebral vascular accident)    Former smoker    Gallstones    Hyperlipidemia    Dizziness    Stenosis of carotid artery  may 2017    COPD (chronic obstructive pulmonary disease)    No significant past surgical history    H/O carotid endarterectomy            FAMILY HISTORY:  No pertinent family history in first degree relatives      Prescriptions:  ondansetron 4 mg oral tablet, disintegratin tab(s) orally every 8 hours, As Needed for nausea    Note:Last filled in feb. (2020 14:44)      SOCIAL HISTORY  Smoking History: former smoker; quit multiple years ago  20-25 pack year     REVIEW OF SYSTEMS:    CONSTITUTIONAL:  No fevers, chills, sweats    HEENT:  Eyes:  No diplopia or blurred vision. ENT:  No earache, sore throat or runny nose.    CARDIOVASCULAR:  No pressure, squeezing, tightness, or heaviness about the chest; no palpitations.    RESPIRATORY:  Per HPI    GASTROINTESTINAL:  No abdominal pain, nausea, vomiting or diarrhea.    GENITOURINARY:  No dysuria, frequency or urgency.    NEUROLOGIC:  No paresthesias, fasciculations, seizures or weakness.    PSYCHIATRIC:  No disorder of thought or mood.    Vital Signs Last 24 Hrs  T(C): 36.7 (2020 09:14), Max: 37.1 (2020 15:36)  T(F): 98 (2020 09:14), Max: 98.8 (2020 15:58)  HR: 79 (2020 09:14) (79 - 99)  BP: 130/86 (2020 10:08) (108/75 - 138/78)  BP(mean): --  RR: 16 (2020 09:14) (16 - 20)  SpO2: 95% (2020 10:08) (95% - 100%)    PHYSICAL EXAMINATION:    GENERAL: The patient is a elderly female in NAD     HEENT: Head is normocephalic and atraumatic. Extraocular muscles are intact. Mucous membranes are moist.     NECK: Supple.     LUNGS: Clear to auscultation without wheezing, rales, or rhonchi. Respirations unlabored    HEART: Regular rate and rhythm without murmur.    ABDOMEN: Soft, nontender, and nondistended.  No hepatosplenomegaly is noted.    EXTREMITIES: Without any cyanosis, clubbing, rash, lesions or edema.    NEUROLOGIC: Grossly intact      MEDICATIONS  (STANDING):  atorvastatin 10 milliGRAM(s) Oral at bedtime  budesonide  80 MICROgram(s)/formoterol 4.5 MICROgram(s) Inhaler 2 Puff(s) Inhalation two times a day  dextrose 5% + sodium chloride 0.45%. 1000 milliLiter(s) (50 mL/Hr) IV Continuous <Continuous>  metoprolol succinate ER 25 milliGRAM(s) Oral daily  pantoprazole  Injectable 40 milliGRAM(s) IV Push every 12 hours  senna 2 Tablet(s) Oral at bedtime      MEDICATIONS  (PRN):  ALBUTerol    90 MICROgram(s) HFA Inhaler 2 Puff(s) Inhalation every 6 hours PRN Shortness of Breath and/or Wheezing  bisacodyl 5 milliGRAM(s) Oral every 12 hours PRN Constipation  ondansetron Injectable 4 milliGRAM(s) IV Push every 6 hours PRN Nausea and/or Vomiting        LABS:   CBC Full  -  ( 2020 04:55 )  WBC Count : 6.55 K/uL  RBC Count : 3.21 M/uL  Hemoglobin : 8.3 g/dL  Hematocrit : 26.5 %  Platelet Count - Automated : 293 K/uL  Mean Cell Volume : 82.6 fl  Mean Cell Hemoglobin : 25.9 pg  Mean Cell Hemoglobin Concentration : 31.3 gm/dL  Auto Neutrophil # : 3.89 K/uL  Auto Lymphocyte # : 1.60 K/uL  Auto Monocyte # : 0.55 K/uL  Auto Eosinophil # : 0.46 K/uL  Auto Basophil # : 0.03 K/uL  Auto Neutrophil % : 59.4 %  Auto Lymphocyte % : 24.4 %  Auto Monocyte % : 8.4 %  Auto Eosinophil % : 7.0 %  Auto Basophil % : 0.5 %    PT/INR - ( 2020 11:20 )   PT: 15.7 sec;   INR: 1.33 ratio         PTT - ( 2020 11:20 )  PTT:27.6 sec      142  |  105  |  26<H>  ----------------------------<  95  3.7   |  29  |  1.57<H>    Ca    8.5      2020 04:55  Phos  2.9       Mg     2.0         TPro  6.5  /  Alb  3.8  /  TBili  0.5  /  DBili  x   /  AST  13  /  ALT  <5<L>  /  AlkPhos  60                  RADIOLOGY & ADDITIONAL STUDIES:  ra< from: CT Angio Chest w/ IV Cont (20 @ 20:00) >     EXAM:  CT ANGIO CHEST (W)AW IC                          PROCEDURE DATE:  2020          INTERPRETATION:  CLINICAL INFORMATION: Shortness of breath    COMPARISON: None.    PROCEDURE:  CT Angiography of the Chest.  48 ml of Omnipaque 350 was injected intravenously.  Sagittal and coronal reformats were performed as well as 3D (MIP) reconstructions.    FINDINGS:    LUNGS AND AIRWAYS: Patent central airways.  Biapical pleural parenchymal scarring. Mild centrilobular emphysema. No lung mass, suspicious nodule, or lung consolidation.  PLEURA: No pleural effusion.  MEDIASTINUM AND TARA: No lymphadenopathy.  VESSELS: Adequate pulmonary arterial opacification. Evaluation mildly degraded by respiratory motion. No pulmonary embolism. Main pulmonary artery is not dilated. Atherosclerotic disease of the thoracic aorta with mild aneurysmal dilatation. Ascending thoracic aorta measures up to 4 cm. Distal descending thoracic aorta measures up to 3.4 cm. Irregular peripheral thrombus within the descending thoracic aorta. Coronary artery atherosclerotic calcifications.  HEART: Heart size is normal. Small pericardial effusion.  CHEST WALL AND LOWER NECK: Within normal limits.  VISUALIZED UPPER ABDOMEN: Cholecystectomy clips. Mild thickening of the adrenal glands.  BONES: Multiple old right posterior rib fractures. Indeterminate age mild compression fractures of T2, T8, and T10. Degenerative changes of the spine.    IMPRESSION:  No pulmonary embolism.    No acute parenchymal or pleural lung disease.    Biapical pleural parenchymal scarring.    Atherosclerotic disease of the thoracic aorta with mild aneurysmal dilatation; ascending thoracic aorta measures up to 4 cm and distal descending thoracic aorta measures up to 3.4 cm. Irregular peripheral thrombus within the descending thoracic aorta.    Indeterminate age mild compression fractures of T2, T8, and T10.                MIO HUERTA D.O. ATTENDING RADIOLOGIST  This document has been electronically signed. Aug  2 2020  9:01PM    < end of copied text >    ECHO:    Aortic Valve/Aorta: Calcified trileaflet aortic valve with  decreased opening.Peak transaortic valve gradient equals 6  mm Hg. Mild aortic regurgitation.  Peak left ventricular  outflow tract gradient equals 4 mm Hg.  Aortic Root: 2.8 cm.  LVOT diameter: 1.9 cm.  Left Atrium: Lipomatous hypertrophy of interatrial septum.  LeftVentricle: Normal left ventricular systolic function.  Basal septal hypertrophy.  Right Heart: Normal right atrium. Normal right ventricular  size and function. Normal tricuspid valve. Minimal  tricuspid regurgitation. Normal pulmonic valve.  Pericardium/Pleura: Normal pericardium with trace  pericardial effusion.  Hemodynamic: Estimated right atrial pressure is 8 mm Hg.  ------------------------------------------------------------------------  Conclusions:  1. Basal septal hypertrophy.  2. Normal left ventricular systolic function.  3. Normal right ventricular size and function.  *** No previous Echo exam.  ------------------------------------------------------------------------  Confirmed on  2020 - 14:15:27 by Catrachito Nicole M.D.  ------------------------------------------------------------------------ (20 @ 08:50)    ASSESSMENT:  81 yo with copd admittedf or anemia    PLAN:  obtain routine 2 view xray  continue symbicort for now  taper down to 2L     Thank you for allowing me to participate in the care of this patient.  Please feel free to call me for any questions/concerns.      Cassidy Sandra, DO  [facility] Pulmonary/Sleep Medicine  [phone]

## 2020-11-08 NOTE — PROGRESS NOTE ADULT - SUBJECTIVE AND OBJECTIVE BOX
SUBJECTIVE / OVERNIGHT EVENTS: pt seen and examined      MEDICATIONS  (STANDING):  atorvastatin 10 milliGRAM(s) Oral at bedtime  budesonide  80 MICROgram(s)/formoterol 4.5 MICROgram(s) Inhaler 2 Puff(s) Inhalation two times a day  dextrose 5% + sodium chloride 0.45%. 1000 milliLiter(s) (50 mL/Hr) IV Continuous <Continuous>  metoprolol succinate ER 25 milliGRAM(s) Oral daily  pantoprazole  Injectable 40 milliGRAM(s) IV Push every 12 hours  senna 2 Tablet(s) Oral at bedtime    MEDICATIONS  (PRN):  ALBUTerol    90 MICROgram(s) HFA Inhaler 2 Puff(s) Inhalation every 6 hours PRN Shortness of Breath and/or Wheezing  bisacodyl 5 milliGRAM(s) Oral every 12 hours PRN Constipation  ondansetron Injectable 4 milliGRAM(s) IV Push every 6 hours PRN Nausea and/or Vomiting    Vital Signs Last 24 Hrs  T(C): 36.6 (2020 21:12), Max: 36.8 (2020 17:04)  T(F): 97.9 (2020 21:12), Max: 98.2 (2020 17:04)  HR: 93 (2020 21:12) (79 - 93)  BP: 155/66 (2020 21:12) (115/56 - 157/68)  BP(mean): --  RR: 17 (2020 21:12) (16 - 17)  SpO2: 98% (2020 21:12) (95% - 100%)    CAPILLARY BLOOD GLUCOSE        I&O's Summary    2020 07:  -  2020 07:00  --------------------------------------------------------  IN: 0 mL / OUT: 250 mL / NET: -250 mL    2020 07:  -  2020 22:45  --------------------------------------------------------  IN: 250 mL / OUT: 0 mL / NET: 250 mL        Constitutional: No fever, fatigue  Skin: No rash.  Eyes: No recent vision problems or eye pain.  ENT: No congestion, ear pain, or sore throat.  Cardiovascular: No chest pain or palpation.  Respiratory: No cough, shortness of breath, congestion, or wheezing.  Gastrointestinal: No abdominal pain, nausea, vomiting, or diarrhea.  Genitourinary: No dysuria.  Musculoskeletal: No joint swelling.  Neurologic: No headache.    PHYSICAL EXAM:  GENERAL: NAD  EYES: EOMI, PERRLA  NECK: Supple, No JVD  CHEST/LUNG: dec breath sounds at bases   HEART:  S1 , S2 +  ABDOMEN: soft , bs+  EXTREMITIES:  trace edema  NEUROLOGY:alert awake      LABS:                        8.3    6.55  )-----------( 293      ( 2020 04:55 )             26.5     11-08    142  |  105  |  26<H>  ----------------------------<  95  3.7   |  29  |  1.57<H>    Ca    8.5      2020 04:55  Phos  2.9     11-08  Mg     2.0     11-08    TPro  6.5  /  Alb  3.8  /  TBili  0.5  /  DBili  x   /  AST  13  /  ALT  <5<L>  /  AlkPhos  60  11-07    PT/INR - ( 2020 11:20 )   PT: 15.7 sec;   INR: 1.33 ratio         PTT - ( 2020 11:20 )  PTT:27.6 sec      Urinalysis Basic - ( 2020 16:52 )    Color: Yellow / Appearance: Clear / S.020 / pH: x  Gluc: x / Ketone: Negative  / Bili: Negative / Urobili: Negative   Blood: x / Protein: 30 mg/dL / Nitrite: Negative   Leuk Esterase: Large / RBC: 2 /hpf / WBC 11 /HPF   Sq Epi: x / Non Sq Epi: 2 /hpf / Bacteria: Negative        RADIOLOGY & ADDITIONAL TESTS:    Imaging Personally Reviewed:    Consultant(s) Notes Reviewed:      Care Discussed with Consultants/Other Providers:

## 2020-11-08 NOTE — PROGRESS NOTE ADULT - ASSESSMENT
may defere endosocpic evlaution based on age and comorbidies, recommend ct chest/ abdomen and pevlis rule out occult malignancy.  would keep off ac/ risk> benefit will discuss with family.

## 2020-11-08 NOTE — PHYSICAL THERAPY INITIAL EVALUATION ADULT - ACTIVE RANGE OF MOTION EXAMINATION, REHAB EVAL
bilateral upper extremity Active ROM was WFL (within functional limits)/bilateral  lower extremity Active ROM was WFL (within functional limits)/B shoulder flex ~100degrees

## 2020-11-08 NOTE — CONSULT NOTE ADULT - ASSESSMENT
80y Female with history of COPD on home O2, afib on AC presents with anemia on outpatient labs. Nephrology consulted for elevated Scr.    1) NÉSTOR: likely hemodynamically mediated in setting of anemia and decreased PO intake. Check UA, urine sodium, urine creatinine and renal US. Start D5 1/2 NS @ 50 ml/hour as patient NPO. Avoid nephrotoxins.    2) CKD-3b: Baseline Scr 1-1.2 as per prior records. Outpatient CKD work up. Monitor electrolytes.    3) HTN with CKD: BP controlled. Continue with current medications. Monitor BP.    4) Anemia: As per GI.

## 2020-11-08 NOTE — PHYSICAL THERAPY INITIAL EVALUATION ADULT - PERTINENT HX OF CURRENT PROBLEM, REHAB EVAL
81 yo F PMH COPD, chronic hypoxic respiratory failure on 4L NC, HTN, HLD, CVA, AFib on eliquis presenting with months of progressive fatigue and sent in from PCP office for anemia. Pt reports in the past few months, she has noticed increasing weakness and fatigue contd below

## 2020-11-08 NOTE — PHYSICAL THERAPY INITIAL EVALUATION ADULT - CRITERIA FOR SKILLED THERAPEUTIC INTERVENTIONS
anticipated equipment needs at discharge/therapy frequency/anticipated discharge recommendation/functional limitations in following categories/predicted duration of therapy intervention/impairments found

## 2020-11-08 NOTE — PROGRESS NOTE ADULT - ASSESSMENT
Assessment:    1. Anemia  2. PAF    Plan.    1.  S/p Packed cells, H&H improving, hold anticoagulants, GI evaluation appreciated  2.  Continue beta blockers      Thank you     Plan was discussed with Dr. Zahraa Mantilla    [facility]  Zahraa Mantilla D.O., [facility], KEVIN Vieyra, MSN, AGNP-[address], [address]  [place] [address]  [phone]

## 2020-11-09 LAB
ANION GAP SERPL CALC-SCNC: 9 MMOL/L — SIGNIFICANT CHANGE UP (ref 5–17)
BUN SERPL-MCNC: 24 MG/DL — HIGH (ref 7–23)
CALCIUM SERPL-MCNC: 8.4 MG/DL — SIGNIFICANT CHANGE UP (ref 8.4–10.5)
CHLORIDE SERPL-SCNC: 102 MMOL/L — SIGNIFICANT CHANGE UP (ref 96–108)
CO2 SERPL-SCNC: 30 MMOL/L — SIGNIFICANT CHANGE UP (ref 22–31)
CREAT SERPL-MCNC: 1.57 MG/DL — HIGH (ref 0.5–1.3)
GLUCOSE SERPL-MCNC: 91 MG/DL — SIGNIFICANT CHANGE UP (ref 70–99)
HCT VFR BLD CALC: 27.3 % — LOW (ref 34.5–45)
HGB BLD-MCNC: 8.6 G/DL — LOW (ref 11.5–15.5)
MCHC RBC-ENTMCNC: 26.5 PG — LOW (ref 27–34)
MCHC RBC-ENTMCNC: 31.5 GM/DL — LOW (ref 32–36)
MCV RBC AUTO: 84 FL — SIGNIFICANT CHANGE UP (ref 80–100)
NRBC # BLD: 0 /100 WBCS — SIGNIFICANT CHANGE UP (ref 0–0)
PLATELET # BLD AUTO: 300 K/UL — SIGNIFICANT CHANGE UP (ref 150–400)
POTASSIUM SERPL-MCNC: 3.5 MMOL/L — SIGNIFICANT CHANGE UP (ref 3.5–5.3)
POTASSIUM SERPL-SCNC: 3.5 MMOL/L — SIGNIFICANT CHANGE UP (ref 3.5–5.3)
RBC # BLD: 3.25 M/UL — LOW (ref 3.8–5.2)
RBC # FLD: 16 % — HIGH (ref 10.3–14.5)
SODIUM SERPL-SCNC: 141 MMOL/L — SIGNIFICANT CHANGE UP (ref 135–145)
WBC # BLD: 5.85 K/UL — SIGNIFICANT CHANGE UP (ref 3.8–10.5)
WBC # FLD AUTO: 5.85 K/UL — SIGNIFICANT CHANGE UP (ref 3.8–10.5)

## 2020-11-09 PROCEDURE — 76770 US EXAM ABDO BACK WALL COMP: CPT | Mod: 26

## 2020-11-09 PROCEDURE — 99232 SBSQ HOSP IP/OBS MODERATE 35: CPT

## 2020-11-09 RX ORDER — LANOLIN ALCOHOL/MO/W.PET/CERES
5 CREAM (GRAM) TOPICAL ONCE
Refills: 0 | Status: COMPLETED | OUTPATIENT
Start: 2020-11-09 | End: 2020-11-09

## 2020-11-09 RX ADMIN — ATORVASTATIN CALCIUM 10 MILLIGRAM(S): 80 TABLET, FILM COATED ORAL at 22:06

## 2020-11-09 RX ADMIN — SODIUM CHLORIDE 50 MILLILITER(S): 9 INJECTION, SOLUTION INTRAVENOUS at 22:06

## 2020-11-09 RX ADMIN — BUDESONIDE AND FORMOTEROL FUMARATE DIHYDRATE 2 PUFF(S): 160; 4.5 AEROSOL RESPIRATORY (INHALATION) at 05:40

## 2020-11-09 RX ADMIN — BUDESONIDE AND FORMOTEROL FUMARATE DIHYDRATE 2 PUFF(S): 160; 4.5 AEROSOL RESPIRATORY (INHALATION) at 17:25

## 2020-11-09 RX ADMIN — PANTOPRAZOLE SODIUM 40 MILLIGRAM(S): 20 TABLET, DELAYED RELEASE ORAL at 05:40

## 2020-11-09 RX ADMIN — PANTOPRAZOLE SODIUM 40 MILLIGRAM(S): 20 TABLET, DELAYED RELEASE ORAL at 17:25

## 2020-11-09 RX ADMIN — Medication 25 MILLIGRAM(S): at 01:45

## 2020-11-09 NOTE — DIETITIAN INITIAL EVALUATION ADULT. - OTHER INFO
Patient referred for stage 2 or greater pressure ulcer, noted to sacrum.  Patient found resting in bed, anxious to eat after being NPO x 3 days.  Hungry and c/o nausea from not eating but awaiting an EGD later today.  Patient pressing to know time.  Patient admits to weight loss about 15 pounds PTA, over recent months.  Was supplementing with Chocolate Ensure Enlive daily. Daughter was very involved and encouraging Patient to eat. Patient has had a difficult few years, emotionally ,after  losing  3 years ago and then 2 small dogs 2 years ago. Patient denies taking vitamins, not sure. Simple nutrition focused exam done with noted mild to moderate muscle loss.

## 2020-11-09 NOTE — DIETITIAN INITIAL EVALUATION ADULT. - PROBLEM SELECTOR PROBLEM 2
Have You Had Laser Removal Of Brown Spots Before?: has not had previous treatment
When Outside In The Sun, Do You...: always burns, tans with difficulty
NÉSTOR (acute kidney injury)

## 2020-11-09 NOTE — DIETITIAN INITIAL EVALUATION ADULT. - PERTINENT LABORATORY DATA
Na 141, K+ 3.5, BUN 24, Cr 1.57, BG 91, Phos --, Alk Phos --, AST --, ALT --, Mg --, Ca 8.4, HbA1c --

## 2020-11-09 NOTE — PROGRESS NOTE ADULT - ASSESSMENT
pt o2 requiring copd.  pt with no cancer on ct. pt is at risk for occult malignancy, discussed with daughter risk of missing malignancy.  pt has had egd in 2019 and 2015.  risk of  cva discussed. based on age and comorbidies, would recommend conservative magnemtn, ppi therapy and off a/c.  daughter would not want surgery if malgincy - weight loss reported 10 lbs, but ct negative for mass or lesion

## 2020-11-09 NOTE — DIETITIAN INITIAL EVALUATION ADULT. - SIGNS/SYMPTOMS
WT loss 12.5%, BMI 18.5, nausea with likely GI bleed, Moderate muscle loss, NPO x3 days, St 2 sacrum Stage 2 to sacrum, unintended wt loss 12.5%

## 2020-11-09 NOTE — PROGRESS NOTE ADULT - SUBJECTIVE AND OBJECTIVE BOX
SUBJECTIVE / OVERNIGHT EVENTS: pt seen and examined    MEDICATIONS  (STANDING):  atorvastatin 10 milliGRAM(s) Oral at bedtime  budesonide  80 MICROgram(s)/formoterol 4.5 MICROgram(s) Inhaler 2 Puff(s) Inhalation two times a day  dextrose 5% + sodium chloride 0.45%. 1000 milliLiter(s) (50 mL/Hr) IV Continuous <Continuous>  metoprolol succinate ER 25 milliGRAM(s) Oral daily  pantoprazole  Injectable 40 milliGRAM(s) IV Push every 12 hours  senna 2 Tablet(s) Oral at bedtime    MEDICATIONS  (PRN):  ALBUTerol    90 MICROgram(s) HFA Inhaler 2 Puff(s) Inhalation every 6 hours PRN Shortness of Breath and/or Wheezing  bisacodyl 5 milliGRAM(s) Oral every 12 hours PRN Constipation  ondansetron Injectable 4 milliGRAM(s) IV Push every 6 hours PRN Nausea and/or Vomiting    Vital Signs Last 24 Hrs  T(C): 37 (2020 21:06), Max: 37 (2020 21:06)  T(F): 98.6 (2020 21:06), Max: 98.6 (2020 21:06)  HR: 72 (2020 21:06) (69 - 97)  BP: 144/64 (2020 21:06) (129/55 - 169/78)  BP(mean): --  RR: 18 (2020 21:06) (17 - 18)  SpO2: 96% (2020 21:06) (96% - 100%)    Constitutional: No fever, fatigue  Skin: No rash.  Eyes: No recent vision problems or eye pain.  ENT: No congestion, ear pain, or sore throat.  Cardiovascular: No chest pain or palpation.  Respiratory: No cough, shortness of breath, congestion, or wheezing.  Gastrointestinal: No abdominal pain, nausea, vomiting, or diarrhea.  Genitourinary: No dysuria.  Musculoskeletal: No joint swelling.  Neurologic: No headache.    PHYSICAL EXAM:  GENERAL: NAD  EYES: EOMI, PERRLA  NECK: Supple, No JVD  CHEST/LUNG: dec breath sounds at bases   HEART:  S1 , S2 +  ABDOMEN: soft , bs+  EXTREMITIES:  trace edema  NEUROLOGY:alert awake      LABS:      141  |  102  |  24<H>  ----------------------------<  91  3.5   |  30  |  1.57<H>    Ca    8.4      2020 07:08  Phos  2.9       Mg     2.0           Creatinine Trend: 1.57 <--, 1.57 <--, 1.69 <--                        8.6    5.85  )-----------( 300      ( 2020 07:11 )             27.3     Urine Studies:  Urinalysis Basic - ( 2020 16:52 )    Color: Yellow / Appearance: Clear / S.020 / pH:   Gluc:  / Ketone: Negative  / Bili: Negative / Urobili: Negative   Blood:  / Protein: 30 mg/dL / Nitrite: Negative   Leuk Esterase: Large / RBC: 2 /hpf / WBC 11 /HPF   Sq Epi:  / Non Sq Epi: 2 /hpf / Bacteria: Negative      Sodium, Random Urine: 69 mmol/L ( @ 16:52)  Creatinine, Random Urine: 156 mg/dL ( @ 16:52)

## 2020-11-09 NOTE — PROGRESS NOTE ADULT - ASSESSMENT
80y Female with history of COPD on home O2, afib on AC presents with anemia on outpatient labs. Nephrology consulted for elevated Scr.    1) NÉSTOR: likely hemodynamically mediated in setting of anemia and decreased PO intake. UA with mild pyuria however contaminated (squamous epithelial cells) and low FeNa. Follow up renal US. Continue with D5 1/2 NS @ 50 ml/hour as patient NPO. Avoid nephrotoxins.    2) CKD-3b: Baseline Scr 1-1.2 as per prior records. Outpatient CKD work up. Monitor electrolytes.    3) HTN with CKD: BP controlled. Continue with current medications. Monitor BP.    4) Anemia: As per GI.

## 2020-11-09 NOTE — DIETITIAN INITIAL EVALUATION ADULT. - ETIOLOGY
Altered GI function, Inadequate pro energy intake Increased demand for nutrients for wound healing and repair

## 2020-11-09 NOTE — DIETITIAN INITIAL EVALUATION ADULT. - ADD RECOMMEND
NPO s/p EGD, suggest Ensure Enlive x2 when able, multivitamin, Monitor diet tolerance, po intake, GI tolerance, weight trends, labs, and skin integrity

## 2020-11-09 NOTE — DIETITIAN INITIAL EVALUATION ADULT. - PERTINENT MEDS FT
MEDICATIONS  (STANDING):  atorvastatin 10 milliGRAM(s) Oral at bedtime  budesonide  80 MICROgram(s)/formoterol 4.5 MICROgram(s) Inhaler 2 Puff(s) Inhalation two times a day  dextrose 5% + sodium chloride 0.45%. 1000 milliLiter(s) (50 mL/Hr) IV Continuous <Continuous>  metoprolol succinate ER 25 milliGRAM(s) Oral daily  pantoprazole  Injectable 40 milliGRAM(s) IV Push every 12 hours  senna 2 Tablet(s) Oral at bedtime    MEDICATIONS  (PRN):  ALBUTerol    90 MICROgram(s) HFA Inhaler 2 Puff(s) Inhalation every 6 hours PRN Shortness of Breath and/or Wheezing  bisacodyl 5 milliGRAM(s) Oral every 12 hours PRN Constipation  ondansetron Injectable 4 milliGRAM(s) IV Push every 6 hours PRN Nausea and/or Vomiting

## 2020-11-09 NOTE — PROGRESS NOTE ADULT - SUBJECTIVE AND OBJECTIVE BOX
[facility] NEPHROLOGY- PROGRESS NOTE    80y Female with history of COPD on home O2, afib on AC presents with anemia on outpatient labs. Nephrology consulted for elevated Scr.    REVIEW OF SYSTEMS:  Gen: no changes in weight  Cards: no chest pain  Resp: no dyspnea  GI: no nausea or vomiting or diarrhea  Vascular: no LE edema    No Known Allergies      Hospital Medications: Medications reviewed    VITALS:  T(F): 98 (20 @ 13:45), Max: 98.2 (20 @ 17:04)  HR: 73 (20 @ 13:45)  BP: 129/55 (20 @ 13:45)  RR: 18 (20 @ 13:45)  SpO2: 99% (20 @ 13:45)  Wt(kg): --  Height (cm): 160 (11-07 @ 10:36)  Weight (kg): 47.6 (11-07 @ 10:36)  BMI (kg/m2): 18.6 (11-07 @ 10:36)  BSA (m2): 1.47 (11-07 @ 10:36)     @ 07:01  -   @ 07:00  --------------------------------------------------------  IN: 650 mL / OUT: 500 mL / NET: 150 mL        PHYSICAL EXAM:    Gen: NAD, calm  Cards: RRR, +S1/S2, no M/G/R  Resp: CTA B/L  GI: soft, NT/ND, NABS  Vascular: no LE edema B/L    LABS:      141  |  102  |  24<H>  ----------------------------<  91  3.5   |  30  |  1.57<H>    Ca    8.4      2020 07:08  Phos  2.9       Mg     2.0           Creatinine Trend: 1.57 <--, 1.57 <--, 1.69 <--                        8.6    5.85  )-----------( 300      ( 2020 07:11 )             27.3     Urine Studies:  Urinalysis Basic - ( 2020 16:52 )    Color: Yellow / Appearance: Clear / S.020 / pH:   Gluc:  / Ketone: Negative  / Bili: Negative / Urobili: Negative   Blood:  / Protein: 30 mg/dL / Nitrite: Negative   Leuk Esterase: Large / RBC: 2 /hpf / WBC 11 /HPF   Sq Epi:  / Non Sq Epi: 2 /hpf / Bacteria: Negative      Sodium, Random Urine: 69 mmol/L ( @ 16:52)  Creatinine, Random Urine: 156 mg/dL ( @ 16:52)      RADIOLOGY & ADDITIONAL STUDIES:

## 2020-11-09 NOTE — DIETITIAN INITIAL EVALUATION ADULT. - REASON INDICATOR FOR ASSESSMENT
Stage 2 pressure ulcer to sacrum.  Also noted with weight loss 15 pounds in last few months, with nausea no vomiting. BMI <19.

## 2020-11-09 NOTE — DIETITIAN INITIAL EVALUATION ADULT. - ORAL INTAKE PTA/DIET HISTORY
Patient lives with daughter who assists with meal preparation.  PTA, Patient did not follow any special diet.  Breakfast was usually a bagel with tea, lunch may be sandwich and dinner usually pro, starch and vegetables.  Likes Pepsi and would take Ensure Enlive daily.

## 2020-11-09 NOTE — PROGRESS NOTE ADULT - SUBJECTIVE AND OBJECTIVE BOX
SULMA [id number]  80yFemale  T(C): 36.6 (11-09-20 @ 06:06), Max: 36.8 (11-08-20 @ 17:04)  HR: 79 (11-09-20 @ 06:06) (79 - 97)  BP: 131/69 (11-09-20 @ 06:06) (115/56 - 169/78)  RR: 17 (11-09-20 @ 06:06) (16 - 17)  SpO2: 100% (11-09-20 @ 06:06) (95% - 100%)  Wt(kg): --  11-08 @ 07:01  -  11-09 @ 07:00  --------------------------------------------------------  IN: 650 mL / OUT: 500 mL / NET: 150 mL      normal cephalic atraumatic  s1s2   clear to ascultation bilaterally  soft, non tender, non distended no guarding or rebound  no clubbing cyanosis or edema

## 2020-11-09 NOTE — PROGRESS NOTE ADULT - SUBJECTIVE AND OBJECTIVE BOX
PULMONARY PROGRESS NOTE    SULMA MEI  MRN-[id number]    Patient is a 80y old  Female who presents with a chief complaint of anemia (09 Nov 2020 09:55)      HPI:  -  -    ROS:   -    ACTIVE MEDICATION LIST:  MEDICATIONS  (STANDING):  atorvastatin 10 milliGRAM(s) Oral at bedtime  budesonide  80 MICROgram(s)/formoterol 4.5 MICROgram(s) Inhaler 2 Puff(s) Inhalation two times a day  dextrose 5% + sodium chloride 0.45%. 1000 milliLiter(s) (50 mL/Hr) IV Continuous <Continuous>  metoprolol succinate ER 25 milliGRAM(s) Oral daily  pantoprazole  Injectable 40 milliGRAM(s) IV Push every 12 hours  senna 2 Tablet(s) Oral at bedtime    MEDICATIONS  (PRN):  ALBUTerol    90 MICROgram(s) HFA Inhaler 2 Puff(s) Inhalation every 6 hours PRN Shortness of Breath and/or Wheezing  bisacodyl 5 milliGRAM(s) Oral every 12 hours PRN Constipation  ondansetron Injectable 4 milliGRAM(s) IV Push every 6 hours PRN Nausea and/or Vomiting      EXAM:  Vital Signs Last 24 Hrs  T(C): 36.4 (09 Nov 2020 09:16), Max: 36.8 (08 Nov 2020 17:04)  T(F): 97.6 (09 Nov 2020 09:16), Max: 98.2 (08 Nov 2020 17:04)  HR: 69 (09 Nov 2020 09:16) (69 - 97)  BP: 131/76 (09 Nov 2020 09:16) (131/69 - 169/78)  BP(mean): --  RR: 18 (09 Nov 2020 09:16) (16 - 18)  SpO2: 100% (09 Nov 2020 09:16) (98% - 100%)    GENERAL: The patient is awake and alert in no apparent distress.     LUNGS: Clear to auscultation without wheezing, rales or rhonchi; respirations unlabored    HEART: Regular rate and rhythm without murmur.                            8.6    5.85  )-----------( 300      ( 09 Nov 2020 07:11 )             27.3       11-09    141  |  102  |  24<H>  ----------------------------<  91  3.5   |  30  |  1.57<H>    Ca    8.4      09 Nov 2020 07:08  Phos  2.9     11-08  Mg     2.0     11-08      >>> <<<    PROBLEM LIST:  80y Female with HEALTH ISSUES - PROBLEM Dx:  Discharge planning issues  Discharge planning issues    Persistent atrial fibrillation  Persistent atrial fibrillation    Chronic obstructive pulmonary disease, unspecified COPD type  Chronic obstructive pulmonary disease, unspecified COPD type    NÉSTOR (acute kidney injury)  NÉSTOR (acute kidney injury)    Anemia due to acute blood loss  Anemia due to acute blood loss              RECS:        Please call with any questions.    Cassidy Flores DO  [facility] Pulmonary/Sleep Medicine  [phone]   PULMONARY PROGRESS NOTE    SULMA MEI  MRN-[id number]    Patient is a 80y old  Female who presents with a chief complaint of anemia (09 Nov 2020 09:55)      HPI:  remains NPO this morning when i saw her  was on 3 L  comfortable but hungry   -    ROS:   -    ACTIVE MEDICATION LIST:  MEDICATIONS  (STANDING):  atorvastatin 10 milliGRAM(s) Oral at bedtime  budesonide  80 MICROgram(s)/formoterol 4.5 MICROgram(s) Inhaler 2 Puff(s) Inhalation two times a day  dextrose 5% + sodium chloride 0.45%. 1000 milliLiter(s) (50 mL/Hr) IV Continuous <Continuous>  metoprolol succinate ER 25 milliGRAM(s) Oral daily  pantoprazole  Injectable 40 milliGRAM(s) IV Push every 12 hours  senna 2 Tablet(s) Oral at bedtime    MEDICATIONS  (PRN):  ALBUTerol    90 MICROgram(s) HFA Inhaler 2 Puff(s) Inhalation every 6 hours PRN Shortness of Breath and/or Wheezing  bisacodyl 5 milliGRAM(s) Oral every 12 hours PRN Constipation  ondansetron Injectable 4 milliGRAM(s) IV Push every 6 hours PRN Nausea and/or Vomiting      EXAM:  Vital Signs Last 24 Hrs  T(C): 36.4 (09 Nov 2020 09:16), Max: 36.8 (08 Nov 2020 17:04)  T(F): 97.6 (09 Nov 2020 09:16), Max: 98.2 (08 Nov 2020 17:04)  HR: 69 (09 Nov 2020 09:16) (69 - 97)  BP: 131/76 (09 Nov 2020 09:16) (131/69 - 169/78)  BP(mean): --  RR: 18 (09 Nov 2020 09:16) (16 - 18)  SpO2: 100% (09 Nov 2020 09:16) (98% - 100%)    GENERAL: The patient is awake and alert in no apparent distress.     LUNGS: Clear to auscultation without wheezing, rales or rhonchi; respirations unlabored    HEART: Regular rate and rhythm without murmur.                            8.6    5.85  )-----------( 300      ( 09 Nov 2020 07:11 )             27.3       11-09    141  |  102  |  24<H>  ----------------------------<  91  3.5   |  30  |  1.57<H>    Ca    8.4      09 Nov 2020 07:08  Phos  2.9     11-08  Mg     2.0     11-08           PROBLEM LIST:  80y Female with HEALTH ISSUES - PROBLEM Dx:  Discharge planning issues  Discharge planning issues    Persistent atrial fibrillation  Persistent atrial fibrillation    Chronic obstructive pulmonary disease, unspecified COPD type  Chronic obstructive pulmonary disease, unspecified COPD type    NÉSTOR (acute kidney injury)  NÉSTOR (acute kidney injury)    Anemia due to acute blood loss  Anemia due to acute blood loss         RECS:      obtain routine 2 view xray  continue symbicort for now  taper down to 2L   Please call with any questions.    Cassidy Flores DO  [facility] Pulmonary/Sleep Medicine  [phone]

## 2020-11-10 ENCOUNTER — TRANSCRIPTION ENCOUNTER (OUTPATIENT)
Age: 80
End: 2020-11-10

## 2020-11-10 LAB
ANION GAP SERPL CALC-SCNC: 13 MMOL/L — SIGNIFICANT CHANGE UP (ref 5–17)
BUN SERPL-MCNC: 16 MG/DL — SIGNIFICANT CHANGE UP (ref 7–23)
CALCIUM SERPL-MCNC: 8.5 MG/DL — SIGNIFICANT CHANGE UP (ref 8.4–10.5)
CHLORIDE SERPL-SCNC: 102 MMOL/L — SIGNIFICANT CHANGE UP (ref 96–108)
CO2 SERPL-SCNC: 27 MMOL/L — SIGNIFICANT CHANGE UP (ref 22–31)
CREAT SERPL-MCNC: 1.54 MG/DL — HIGH (ref 0.5–1.3)
CULTURE RESULTS: SIGNIFICANT CHANGE UP
GLUCOSE SERPL-MCNC: 101 MG/DL — HIGH (ref 70–99)
HCT VFR BLD CALC: 30.2 % — LOW (ref 34.5–45)
HGB BLD-MCNC: 9.3 G/DL — LOW (ref 11.5–15.5)
MCHC RBC-ENTMCNC: 25.8 PG — LOW (ref 27–34)
MCHC RBC-ENTMCNC: 30.8 GM/DL — LOW (ref 32–36)
MCV RBC AUTO: 83.7 FL — SIGNIFICANT CHANGE UP (ref 80–100)
NRBC # BLD: 0 /100 WBCS — SIGNIFICANT CHANGE UP (ref 0–0)
PLATELET # BLD AUTO: 335 K/UL — SIGNIFICANT CHANGE UP (ref 150–400)
POTASSIUM SERPL-MCNC: 3.3 MMOL/L — LOW (ref 3.5–5.3)
POTASSIUM SERPL-SCNC: 3.3 MMOL/L — LOW (ref 3.5–5.3)
RBC # BLD: 3.61 M/UL — LOW (ref 3.8–5.2)
RBC # FLD: 16.3 % — HIGH (ref 10.3–14.5)
SODIUM SERPL-SCNC: 142 MMOL/L — SIGNIFICANT CHANGE UP (ref 135–145)
SPECIMEN SOURCE: SIGNIFICANT CHANGE UP
WBC # BLD: 7.27 K/UL — SIGNIFICANT CHANGE UP (ref 3.8–10.5)
WBC # FLD AUTO: 7.27 K/UL — SIGNIFICANT CHANGE UP (ref 3.8–10.5)

## 2020-11-10 PROCEDURE — 99232 SBSQ HOSP IP/OBS MODERATE 35: CPT

## 2020-11-10 PROCEDURE — 71045 X-RAY EXAM CHEST 1 VIEW: CPT | Mod: 26

## 2020-11-10 RX ORDER — POTASSIUM CHLORIDE 20 MEQ
20 PACKET (EA) ORAL ONCE
Refills: 0 | Status: COMPLETED | OUTPATIENT
Start: 2020-11-10 | End: 2020-11-10

## 2020-11-10 RX ORDER — POTASSIUM CHLORIDE 20 MEQ
10 PACKET (EA) ORAL ONCE
Refills: 0 | Status: DISCONTINUED | OUTPATIENT
Start: 2020-11-10 | End: 2020-11-10

## 2020-11-10 RX ADMIN — ATORVASTATIN CALCIUM 10 MILLIGRAM(S): 80 TABLET, FILM COATED ORAL at 21:43

## 2020-11-10 RX ADMIN — PANTOPRAZOLE SODIUM 40 MILLIGRAM(S): 20 TABLET, DELAYED RELEASE ORAL at 17:17

## 2020-11-10 RX ADMIN — BUDESONIDE AND FORMOTEROL FUMARATE DIHYDRATE 2 PUFF(S): 160; 4.5 AEROSOL RESPIRATORY (INHALATION) at 06:38

## 2020-11-10 RX ADMIN — PANTOPRAZOLE SODIUM 40 MILLIGRAM(S): 20 TABLET, DELAYED RELEASE ORAL at 06:38

## 2020-11-10 RX ADMIN — Medication 25 MILLIGRAM(S): at 06:38

## 2020-11-10 RX ADMIN — BUDESONIDE AND FORMOTEROL FUMARATE DIHYDRATE 2 PUFF(S): 160; 4.5 AEROSOL RESPIRATORY (INHALATION) at 17:18

## 2020-11-10 RX ADMIN — Medication 20 MILLIEQUIVALENT(S): at 17:18

## 2020-11-10 NOTE — PROGRESS NOTE ADULT - SUBJECTIVE AND OBJECTIVE BOX
[facility] NEPHROLOGY- PROGRESS NOTE    80y Female with history of COPD on home O2, afib on AC presents with anemia on outpatient labs. Nephrology consulted for elevated Scr.    REVIEW OF SYSTEMS:  Gen: no changes in weight  Cards: no chest pain  Resp: no dyspnea  GI: no nausea or vomiting or diarrhea  Vascular: no LE edema    No Known Allergies      Hospital Medications: Medications reviewed      VITALS:  T(F): 97.4 (11-10-20 @ 09:24), Max: 98.6 (20 @ 21:06)  HR: 78 (11-10-20 @ 09:24)  BP: 123/59 (11-10-20 @ 09:24)  RR: 18 (11-10-20 @ 09:24)  SpO2: 100% (11-10-20 @ 09:24)  Wt(kg): --     @ 07:01  -  11-10 @ 07:00  --------------------------------------------------------  IN: 1580 mL / OUT: 500 mL / NET: 1080 mL        PHYSICAL EXAM:    Gen: NAD, calm  Cards: RRR, +S1/S2, no M/G/R  Resp: CTA B/L  GI: soft, NT/ND, NABS  Vascular: no LE edema B/L        LABS:  11-10    142  |  102  |  16  ----------------------------<  101<H>  3.3<L>   |  27  |  1.54<H>    Ca    8.5      10 Nov 2020 07:11      Creatinine Trend: 1.54 <--, 1.57 <--, 1.57 <--, 1.69 <--                        9.3    7.27  )-----------( 335      ( 10 Nov 2020 07:12 )             30.2     Urine Studies:  Urinalysis Basic - ( 2020 16:52 )    Color: Yellow / Appearance: Clear / S.020 / pH:   Gluc:  / Ketone: Negative  / Bili: Negative / Urobili: Negative   Blood:  / Protein: 30 mg/dL / Nitrite: Negative   Leuk Esterase: Large / RBC: 2 /hpf / WBC 11 /HPF   Sq Epi:  / Non Sq Epi: 2 /hpf / Bacteria: Negative      Sodium, Random Urine: 69 mmol/L ( @ 16:52)  Creatinine, Random Urine: 156 mg/dL ( @ 16:52)        < from: US Kidney and Bladder (20 @ 16:18) >  IMPRESSION:    No hydronephrosis.    Bilateral atrophic kidneys.      < end of copied text >        < from: Xray Chest 1 View- PORTABLE-Routine (Xray Chest 1 View- PORTABLE-Routine in AM.) (11.10.20 @ 09:04) >    Impression:    The heart is normal in size. The lungs are hyperaerated. The lungs appear to be clear. No pleural effusion. No pneumothorax. No acute bony pathology could be identified.    < end of copied text >

## 2020-11-10 NOTE — DISCHARGE NOTE PROVIDER - CARE PROVIDER_API CALL
ASHWINI TAN  GASTROENTEROLOGY  [address], [address]  [place] [address]  Phone: [phone]  Fax: [phone]  Follow Up Time: 2 weeks    Zahraa Mantilla  CARDIOLOGY  [address], [address]  [place] [address]  Phone: [phone]  Fax: [phone]  Follow Up Time:     Reba Geronimo  INTERNAL MEDICINE  [address], [address]  [place] [address]  Phone: [phone]  Fax: [phone]  Follow Up Time: 2 weeks

## 2020-11-10 NOTE — PROGRESS NOTE ADULT - SUBJECTIVE AND OBJECTIVE BOX
PULMONARY PROGRESS NOTE    SULMA MEI  MRN-[id number]    Patient is a 80y old  Female who presents with a chief complaint of anemia (10 Nov 2020 08:38)      HPI:  on 3L comfortable  eating breakfast  no resp complaints     ROS:   -    ACTIVE MEDICATION LIST:  MEDICATIONS  (STANDING):  atorvastatin 10 milliGRAM(s) Oral at bedtime  budesonide  80 MICROgram(s)/formoterol 4.5 MICROgram(s) Inhaler 2 Puff(s) Inhalation two times a day  dextrose 5% + sodium chloride 0.45%. 1000 milliLiter(s) (50 mL/Hr) IV Continuous <Continuous>  metoprolol succinate ER 25 milliGRAM(s) Oral daily  pantoprazole  Injectable 40 milliGRAM(s) IV Push every 12 hours  potassium chloride    Tablet ER 10 milliEquivalent(s) Oral once  senna 2 Tablet(s) Oral at bedtime    MEDICATIONS  (PRN):  ALBUTerol    90 MICROgram(s) HFA Inhaler 2 Puff(s) Inhalation every 6 hours PRN Shortness of Breath and/or Wheezing  bisacodyl 5 milliGRAM(s) Oral every 12 hours PRN Constipation  ondansetron Injectable 4 milliGRAM(s) IV Push every 6 hours PRN Nausea and/or Vomiting      EXAM:  Vital Signs Last 24 Hrs  T(C): 36.3 (10 Nov 2020 09:24), Max: 37 (09 Nov 2020 21:06)  T(F): 97.4 (10 Nov 2020 09:24), Max: 98.6 (09 Nov 2020 21:06)  HR: 78 (10 Nov 2020 09:24) (72 - 78)  BP: 123/59 (10 Nov 2020 09:24) (123/59 - 156/62)  BP(mean): --  RR: 18 (10 Nov 2020 09:24) (18 - 18)  SpO2: 100% (10 Nov 2020 09:24) (96% - 100%)    GENERAL: The patient is awake and alert in no apparent distress.     LUNGS:  no wheezing; respirations unlabored    HEART: Regular rate and rhythm without murmur.                            9.3    7.27  )-----------( 335      ( 10 Nov 2020 07:12 )             30.2       11-10    142  |  102  |  16  ----------------------------<  101<H>  3.3<L>   |  27  |  1.54<H>    Ca    8.5      10 Nov 2020 07:11       d< from: Xray Chest 1 View- PORTABLE-Routine (Xray Chest 1 View- PORTABLE-Routine in AM.) (11.10.20 @ 09:04) >  EXAM:  XR CHEST PORTABLE ROUTINE 1V                            PROCEDURE DATE:  11/10/2020            INTERPRETATION:  A single chest x-ray was obtained on November 10, 2020.    Indication: COPD. Cough.    Impression:    The heart is normal in size. The lungs are hyperaerated. The lungs appear to be clear. No pleural effusion. No pneumothorax. No acute bony pathology could be identified.                KEENA ELLIS MD; Attending Radiologist  This document has been electronically signed. Nov10 2020  9:27AM    < end of copied text >      PROBLEM LIST:  80y Female with HEALTH ISSUES - PROBLEM Dx:  Discharge planning issues  Discharge planning issues    Persistent atrial fibrillation  Persistent atrial fibrillation    Chronic obstructive pulmonary disease, unspecified COPD type  Chronic obstructive pulmonary disease, unspecified COPD type    NÉSTOR (acute kidney injury)  NÉSTOR (acute kidney injury)    Anemia due to acute blood loss  Anemia due to acute blood loss         RECS:  I tapered her down to 2L - home 02  xray unremarkable  to discharge her back on bevespi 2 puffs BID upon discharge   close f/u with Dr Cristobal    Please call with any questions.    Cassidy Flores DO  [facility] Pulmonary/Sleep Medicine  [phone]

## 2020-11-10 NOTE — DISCHARGE NOTE PROVIDER - CARE PROVIDERS DIRECT ADDRESSES
[email or web address],DirectAddress_Unknown

## 2020-11-10 NOTE — DISCHARGE NOTE PROVIDER - PROVIDER TOKENS
PROVIDER:[TOKEN:[[id number]],FOLLOWUP:[2 weeks]],PROVIDER:[TOKEN:[[id number]]],PROVIDER:[TOKEN:[[id number]],FOLLOWUP:[2 weeks]]

## 2020-11-10 NOTE — PROGRESS NOTE ADULT - ASSESSMENT
stable from GI standpoint, no active bleeding  Hb stable after transfusion  no plans for endoscopic evaluation given increased risk due to medical co morbidities  supportive care, PPI therapy, she has chronic nausea since her CVA unresponsive to multiple medications including medical marijuana  EGD negative x2, last 1 year ago

## 2020-11-10 NOTE — DISCHARGE NOTE PROVIDER - NSDCCPCAREPLAN_GEN_ALL_CORE_FT
PRINCIPAL DISCHARGE DIAGNOSIS  Diagnosis: Anemia  Assessment and Plan of Treatment: S/P PRBC --hgb 9.3 stable   No GI intervention   Follow-up with your primary care provider for further monitoring and treatment.      SECONDARY DISCHARGE DIAGNOSES  Diagnosis: Chronic obstructive pulmonary disease, unspecified COPD type  Assessment and Plan of Treatment: Continue home medication    Diagnosis: NÉSTOR (acute kidney injury)  Assessment and Plan of Treatment: improving    Diagnosis: Persistent atrial fibrillation  Assessment and Plan of Treatment: Atrial fibrillation is the most common heart rhythm problem.  The condition puts you at risk for has stroke and heart attack  It helps if you control your blood pressure, not drink more than 1-2 alcohol drinks per day, cut down on caffeine, getting treatment for over active thyroid gland, and get regular exercise  Call your doctor if you feel your heart racing or beating unusually, chest tightness or pain, lightheaded, faint, shortness of breath especially with exercise  It is important to take your heart medication as prescribed  You may be on anticoagulation which is very important to take as directed - you may need blood work to monitor drug levels     PRINCIPAL DISCHARGE DIAGNOSIS  Diagnosis: Anemia  Assessment and Plan of Treatment: S/P PRBC --hgb 9.3 stable   No GI intervention   Follow-up with your primary care provider for further monitoring and treatment.  Dr. Lyles gastroeterologist in 2 weeks  Please check CBC IN 3 DAYS      SECONDARY DISCHARGE DIAGNOSES  Diagnosis: NÉSTOR (acute kidney injury)  Assessment and Plan of Treatment: improving    Diagnosis: Chronic obstructive pulmonary disease, unspecified COPD type  Assessment and Plan of Treatment: Continue home medication    Diagnosis: Persistent atrial fibrillation  Assessment and Plan of Treatment: Atrial fibrillation is the most common heart rhythm problem.  The condition puts you at risk for has stroke and heart attack  It helps if you control your blood pressure, not drink more than 1-2 alcohol drinks per day, cut down on caffeine, getting treatment for over active thyroid gland, and get regular exercise  Call your doctor if you feel your heart racing or beating unusually, chest tightness or pain, lightheaded, faint, shortness of breath especially with exercise  It is important to take your heart medication as prescribed  You may be on anticoagulation which is very important to take as directed - you may need blood work to monitor drug levels     PRINCIPAL DISCHARGE DIAGNOSIS  Diagnosis: Anemia  Assessment and Plan of Treatment: S/P PRBC --hgb 9.3 stable   No GI intervention   Follow-up with your primary care provider for further monitoring and treatment.  Dr. Lyles gastroeterologist in 2 weeks and find out when to resume Aspirin  Please check CBC IN 3 DAYS      SECONDARY DISCHARGE DIAGNOSES  Diagnosis: NÉSTOR (acute kidney injury)  Assessment and Plan of Treatment: improving    Diagnosis: Chronic obstructive pulmonary disease, unspecified COPD type  Assessment and Plan of Treatment: Continue home medication    Diagnosis: Persistent atrial fibrillation  Assessment and Plan of Treatment: Atrial fibrillation is the most common heart rhythm problem.  The condition puts you at risk for has stroke and heart attack  It helps if you control your blood pressure, not drink more than 1-2 alcohol drinks per day, cut down on caffeine, getting treatment for over active thyroid gland, and get regular exercise  Call your doctor if you feel your heart racing or beating unusually, chest tightness or pain, lightheaded, faint, shortness of breath especially with exercise  It is important to take your heart medication as prescribed  You may be on anticoagulation which is very important to take as directed - you may need blood work to monitor drug levels

## 2020-11-10 NOTE — PROGRESS NOTE ADULT - SUBJECTIVE AND OBJECTIVE BOX
INTERVAL HPI/OVERNIGHT EVENTS:    MEDICATIONS  (STANDING):  atorvastatin 10 milliGRAM(s) Oral at bedtime  budesonide  80 MICROgram(s)/formoterol 4.5 MICROgram(s) Inhaler 2 Puff(s) Inhalation two times a day  dextrose 5% + sodium chloride 0.45%. 1000 milliLiter(s) (50 mL/Hr) IV Continuous <Continuous>  metoprolol succinate ER 25 milliGRAM(s) Oral daily  pantoprazole  Injectable 40 milliGRAM(s) IV Push every 12 hours  senna 2 Tablet(s) Oral at bedtime    MEDICATIONS  (PRN):  ALBUTerol    90 MICROgram(s) HFA Inhaler 2 Puff(s) Inhalation every 6 hours PRN Shortness of Breath and/or Wheezing  bisacodyl 5 milliGRAM(s) Oral every 12 hours PRN Constipation  ondansetron Injectable 4 milliGRAM(s) IV Push every 6 hours PRN Nausea and/or Vomiting      Allergies    No Known Allergies    Intolerances            PHYSICAL EXAM:   Vital Signs:  Vital Signs Last 24 Hrs  T(C): 36.8 (10 Nov 2020 06:11), Max: 37 (2020 21:06)  T(F): 98.2 (10 Nov 2020 06:11), Max: 98.6 (2020 21:06)  HR: 78 (10 Nov 2020 06:11) (69 - 78)  BP: 141/72 (10 Nov 2020 06:11) (129/55 - 156/62)  BP(mean): --  RR: 18 (10 Nov 2020 06:11) (18 - 18)  SpO2: 98% (10 Nov 2020 06:11) (96% - 100%)  Daily     Daily     GENERAL:  no distress  HEENT:  NC/AT,  anicteric  CHEST:   no increased effort, breath sounds clear  HEART:  Regular rhythm  ABDOMEN:  Soft, non-tender, non-distended, normoactive bowel sounds,  no masses ,no hepato-splenomegaly, no signs of chronic liver disease  EXTEREMITIES:  no cyanosis      LABS:                        9.3    7.27  )-----------( 335      ( 10 Nov 2020 07:12 )             30.2     11-10    142  |  102  |  16  ----------------------------<  101<H>  3.3<L>   |  27  |  1.54<H>    Ca    8.5      10 Nov 2020 07:11        Urinalysis Basic - ( 2020 16:52 )    Color: Yellow / Appearance: Clear / S.020 / pH: x  Gluc: x / Ketone: Negative  / Bili: Negative / Urobili: Negative   Blood: x / Protein: 30 mg/dL / Nitrite: Negative   Leuk Esterase: Large / RBC: 2 /hpf / WBC 11 /HPF   Sq Epi: x / Non Sq Epi: 2 /hpf / Bacteria: Negative        RADIOLOGY & ADDITIONAL TESTS:

## 2020-11-10 NOTE — PROGRESS NOTE ADULT - ASSESSMENT
80y Female with history of COPD on home O2, afib on AC presents with anemia on outpatient labs. Nephrology consulted for elevated Scr.    1) NÉSTOR: likely hemodynamically mediated in setting of anemia and decreased PO intake. Scr improving. UA with mild pyuria however contaminated (squamous epithelial cells) and low FeNa. Renal US without obstruction. Can D/C IVF if patient tolerating diet. Avoid nephrotoxins.    2) CKD-3b: Baseline Scr 1-1.2 as per prior records. Outpatient CKD work up. Monitor electrolytes.    3) HTN with CKD: BP controlled. Continue with current medications. Monitor BP.    4) Anemia: As per GI.

## 2020-11-10 NOTE — CHART NOTE - NSCHARTNOTEFT_GEN_A_CORE
Nutrition Services  Diet Rxd- DASH  Dxd with Anemia.  Diagnostic testing cancelled as for conservative management.     Patient seen for follow up for severe malnutrition and progression of diet.  Patient started on clears and now advanced to solids. Patient tolerated 2 slices of toast with butter, jelly and orange juice.  Patient reports that she was hungry and feels ok now.  Complains of chronic nausea related to hx of CVA.  Denies and vomiting.  Reinforced importance of protein in her diet.  Patient with fair comprehension.  Do not see need to restrict Patient's diet. Suggest regular soft foods in view of possible r/o GIB.  Patient may also benefit from Ensure Enlive 8ounces x2.    Previous Dx of Severe malnutrition persists.  Care plan in progress now that diet advanced.    PLAN:  Consider Regular Soft diet  Monitor diet tolerance, po intake, GI tolerance, weight trends, labs, and skin integrity  Add Ensure Enlive 8 ounces x2  RDN remains available for follow up     Sherlyn Nuñez MA,RD,CHES,CDN  Beeper [phone]

## 2020-11-10 NOTE — DISCHARGE NOTE PROVIDER - NSDCMRMEDTOKEN_GEN_ALL_CORE_FT
albuterol 2.5 mg/3 mL (0.083%) inhalation solution: 3 milliliter(s) by nebulizer every 8 hours, As Needed  amLODIPine 2.5 mg oral tablet: 1 tab(s) orally once a day    Note:Pharmacy has twice a day but daughter states PCP changed to once a day.  Anoro Ellipta 62.5 mcg-25 mcg/inh inhalation powder: 1 puff(s) inhaled once a day  apixaban 2.5 mg oral tablet: 1 tab(s) orally 2 times a day  Aspirin Enteric Coated 81 mg oral delayed release tablet: 1 tab(s) orally once a day  atorvastatin 10 mg oral tablet: 1 tab(s) orally once a day  ondansetron 4 mg oral tablet, disintegratin tab(s) orally every 8 hours, As Needed for nausea    Note:Last filled in feb.   albuterol 2.5 mg/3 mL (0.083%) inhalation solution: 3 milliliter(s) by nebulizer every 8 hours, As Needed  Anoro Ellipta 62.5 mcg-25 mcg/inh inhalation powder: 1 puff(s) inhaled once a day  apixaban 2.5 mg oral tablet: 1 tab(s) orally 2 times a day  Aspirin Enteric Coated 81 mg oral delayed release tablet: 1 tab(s) orally once a day  atorvastatin 10 mg oral tablet: 1 tab(s) orally once a day  bisacodyl 5 mg oral delayed release tablet: 1 tab(s) orally every 12 hours, As needed, Constipation  metoprolol succinate 25 mg oral tablet, extended release: 1 tab(s) orally once a day  ondansetron 4 mg oral tablet, disintegratin tab(s) orally every 8 hours, As Needed for nausea    Note:Last filled in feb.  pantoprazole 40 mg intravenous injection: 40 milligram(s) intravenous every 12 hours  senna oral tablet: 2 tab(s) orally once a day (at bedtime)   albuterol 2.5 mg/3 mL (0.083%) inhalation solution: 3 milliliter(s) by nebulizer every 8 hours, As Needed  apixaban 2.5 mg oral tablet: 1 tab(s) orally 2 times a day  atorvastatin 10 mg oral tablet: 1 tab(s) orally once a day  bisacodyl 5 mg oral delayed release tablet: 1 tab(s) orally every 12 hours, As needed, Constipation  metoprolol succinate 25 mg oral tablet, extended release: 1 tab(s) orally once a day  ondansetron 4 mg oral tablet, disintegratin tab(s) orally every 8 hours, As Needed for nausea    Note:Last filled in feb.  pantoprazole 40 mg intravenous injection: 40 milligram(s) intravenous every 12 hours  senna oral tablet: 2 tab(s) orally once a day (at bedtime)

## 2020-11-10 NOTE — DISCHARGE NOTE PROVIDER - HOSPITAL COURSE
79 yo F PMH COPD, chronic hypoxic respiratory failure on 4L NC, HTN, HLD, CVA, AFib on eliquis presenting with months of progressive fatigue and sent in from PCP office for anemia admitted with acute symptomatic blood loss anemia 2/2 suspected GIB. Eliquis held S/P 2 untis PRBC;  Per GI no plans for endoscopic evaluation given increased risk due to medical co morbidities supportive care, PPI therapy. Pt also noted with NÉSTOR due blood loss that resolving.    79 yo F PMH COPD, chronic hypoxic respiratory failure on 4L NC, HTN, HLD, CVA, AFib on eliquis presenting with months of progressive fatigue and sent in from PCP office for anemia admitted with acute symptomatic blood loss anemia 2/2 suspected GIB. Eliquis held S/P 2 untis PRBC;  Per GI no plans for endoscopic evaluation given increased risk due to medical co morbidities supportive care, PPI therapy. Pt also noted with NÉSTOR due blood loss that resolving. Anticoagulation restarted per GI ( Dr. Car). Please CHECK CBC IN 3-4 DAYS ! Follow up with own primary care doctor and Dr. Car in 2 weeks or soon if necessary. 79 yo F PMH COPD, chronic hypoxic respiratory failure on 4L NC, HTN, HLD, CVA, AFib on eliquis presenting with months of progressive fatigue and sent in from PCP office for anemia admitted with acute symptomatic blood loss anemia 2/2 suspected GIB. Eliquis held S/P 2 untis PRBC;  Per GI no plans for endoscopic evaluation given increased risk due to medical co morbidities supportive care, PPI therapy. Pt also noted with NÉSTOR due blood loss that resolving. Anticoagulation restarted per GI ( Dr. Car). Please CHECK CBC IN 3-4 DAYS ! Follow up with own primary care doctor and Dr. Car in 2 weeks or soon if necessary. Dr. Tyson will let her know when to restart Aspirin.

## 2020-11-11 LAB — SARS-COV-2 RNA SPEC QL NAA+PROBE: SIGNIFICANT CHANGE UP

## 2020-11-11 RX ORDER — POTASSIUM CHLORIDE 20 MEQ
10 PACKET (EA) ORAL ONCE
Refills: 0 | Status: DISCONTINUED | OUTPATIENT
Start: 2020-11-11 | End: 2020-11-11

## 2020-11-11 RX ORDER — APIXABAN 2.5 MG/1
2.5 TABLET, FILM COATED ORAL EVERY 12 HOURS
Refills: 0 | Status: DISCONTINUED | OUTPATIENT
Start: 2020-11-12 | End: 2020-11-12

## 2020-11-11 RX ADMIN — PANTOPRAZOLE SODIUM 40 MILLIGRAM(S): 20 TABLET, DELAYED RELEASE ORAL at 17:48

## 2020-11-11 RX ADMIN — BUDESONIDE AND FORMOTEROL FUMARATE DIHYDRATE 2 PUFF(S): 160; 4.5 AEROSOL RESPIRATORY (INHALATION) at 05:42

## 2020-11-11 RX ADMIN — ATORVASTATIN CALCIUM 10 MILLIGRAM(S): 80 TABLET, FILM COATED ORAL at 21:24

## 2020-11-11 RX ADMIN — ONDANSETRON 4 MILLIGRAM(S): 8 TABLET, FILM COATED ORAL at 05:45

## 2020-11-11 RX ADMIN — Medication 25 MILLIGRAM(S): at 09:48

## 2020-11-11 RX ADMIN — Medication 5 MILLIGRAM(S): at 21:25

## 2020-11-11 RX ADMIN — PANTOPRAZOLE SODIUM 40 MILLIGRAM(S): 20 TABLET, DELAYED RELEASE ORAL at 05:46

## 2020-11-11 RX ADMIN — SENNA PLUS 2 TABLET(S): 8.6 TABLET ORAL at 21:24

## 2020-11-11 RX ADMIN — BUDESONIDE AND FORMOTEROL FUMARATE DIHYDRATE 2 PUFF(S): 160; 4.5 AEROSOL RESPIRATORY (INHALATION) at 17:48

## 2020-11-11 NOTE — PROGRESS NOTE ADULT - SUBJECTIVE AND OBJECTIVE BOX
SUBJECTIVE / OVERNIGHT EVENTS: pt seen and examined  MEDICATIONS  (STANDING):  atorvastatin 10 milliGRAM(s) Oral at bedtime  budesonide  80 MICROgram(s)/formoterol 4.5 MICROgram(s) Inhaler 2 Puff(s) Inhalation two times a day  metoprolol succinate ER 25 milliGRAM(s) Oral daily  pantoprazole  Injectable 40 milliGRAM(s) IV Push every 12 hours  senna 2 Tablet(s) Oral at bedtime    MEDICATIONS  (PRN):  ALBUTerol    90 MICROgram(s) HFA Inhaler 2 Puff(s) Inhalation every 6 hours PRN Shortness of Breath and/or Wheezing  bisacodyl 5 milliGRAM(s) Oral every 12 hours PRN Constipation  ondansetron Injectable 4 milliGRAM(s) IV Push every 6 hours PRN Nausea and/or Vomiting    Vital Signs Last 24 Hrs  T(C): 36.6 (:), Max: 36.9 (2020 09:18)  T(F): 97.9 (:), Max: 98.5 (2020 09:18)  HR: 72 (:) (72 - 99)  BP: 134/70 (:) (97/58 - 158/83)  BP(mean): --  RR: 18 (:) (16 - 18)  SpO2: 98% (:) (94% - 99%)    Constitutional: No fever, fatigue  Skin: No rash.  Eyes: No recent vision problems or eye pain.  ENT: No congestion, ear pain, or sore throat.  Cardiovascular: No chest pain or palpation.  Respiratory: No cough, shortness of breath, congestion, or wheezing.  Gastrointestinal: No abdominal pain, nausea, vomiting, or diarrhea.  Genitourinary: No dysuria.  Musculoskeletal: No joint swelling.  Neurologic: No headache.    PHYSICAL EXAM:  GENERAL: NAD  EYES: EOMI, PERRLA  NECK: Supple, No JVD  CHEST/LUNG: dec breath sounds at bases   HEART:  S1 , S2 +  ABDOMEN: soft , bs+  EXTREMITIES:  trace edema  NEUROLOGY:alert awake      LABS:  11-10    142  |  102  |  16  ----------------------------<  101<H>  3.3<L>   |  27  |  1.54<H>    Ca    8.5      10 Nov 2020 07:11      Creatinine Trend: 1.54 <--, 1.57 <--, 1.57 <--, 1.69 <--                        9.3    7.27  )-----------( 335      ( 10 Nov 2020 07:12 )             30.2     Urine Studies:  Urinalysis Basic - ( 2020 16:52 )    Color: Yellow / Appearance: Clear / S.020 / pH:   Gluc:  / Ketone: Negative  / Bili: Negative / Urobili: Negative   Blood:  / Protein: 30 mg/dL / Nitrite: Negative   Leuk Esterase: Large / RBC: 2 /hpf / WBC 11 /HPF   Sq Epi:  / Non Sq Epi: 2 /hpf / Bacteria: Negative      Sodium, Random Urine: 69 mmol/L ( @ 16:52)  Creatinine, Random Urine: 156 mg/dL ( @ 16:52)                Sodium, Random Urine: 69 mmol/L ( @ 16:52)  Creatinine, Random Urine: 156 mg/dL ( @ 16:52)

## 2020-11-11 NOTE — PROGRESS NOTE ADULT - SUBJECTIVE AND OBJECTIVE BOX
[facility] NEPHROLOGY- PROGRESS NOTE    80y Female with history of COPD on home O2, afib on AC presents with anemia on outpatient labs. Nephrology consulted for elevated Scr.    REVIEW OF SYSTEMS:  Gen: no changes in weight  Cards: no chest pain  Resp: no dyspnea  GI: no nausea or vomiting or diarrhea  Vascular: no LE edema    No Known Allergies      Hospital Medications: Medications reviewed      VITALS:  T(F): 98.5 (20 @ 09:18), Max: 98.6 (11-10-20 @ 16:13)  HR: 79 (20 @ 09:18)  BP: 108/67 (20 @ 09:18)  RR: 18 (20 @ 09:18)  SpO2: 99% (20 @ 09:18)  Wt(kg): --    11-10 @ 07:01  -   @ 07:00  --------------------------------------------------------  IN: 1340 mL / OUT: 0 mL / NET: 1340 mL        PHYSICAL EXAM:    Gen: NAD, calm  Cards: RRR, +S1/S2, no M/G/R  Resp: CTA B/L  GI: soft, NT/ND, NABS  Vascular: no LE edema B/L          LABS:  11-10    142  |  102  |  16  ----------------------------<  101<H>  3.3<L>   |  27  |  1.54<H>    Ca    8.5      10 Nov 2020 07:11      Creatinine Trend: 1.54 <--, 1.57 <--, 1.57 <--, 1.69 <--                        9.3    7.27  )-----------( 335      ( 10 Nov 2020 07:12 )             30.2     Urine Studies:  Urinalysis Basic - ( 2020 16:52 )    Color: Yellow / Appearance: Clear / S.020 / pH:   Gluc:  / Ketone: Negative  / Bili: Negative / Urobili: Negative   Blood:  / Protein: 30 mg/dL / Nitrite: Negative   Leuk Esterase: Large / RBC: 2 /hpf / WBC 11 /HPF   Sq Epi:  / Non Sq Epi: 2 /hpf / Bacteria: Negative      Sodium, Random Urine: 69 mmol/L ( @ 16:52)  Creatinine, Random Urine: 156 mg/dL ( @ 16:52)

## 2020-11-11 NOTE — PROGRESS NOTE ADULT - ASSESSMENT
80y Female with history of COPD on home O2, afib on AC presents with anemia on outpatient labs. Nephrology consulted for elevated Scr.    1) NÉSTOR: likely hemodynamically mediated in setting of anemia and decreased PO intake. Scr improving. UA with mild pyuria however contaminated (squamous epithelial cells) and low FeNa. Renal US without obstruction. Encourage PO intake off of IVF. Avoid nephrotoxins.    2) CKD-3b: Baseline Scr 1-1.2 as per prior records. Outpatient CKD work up. Monitor electrolytes.    3) HTN with CKD: BP controlled. Continue with current medications. Monitor BP.    4) Anemia: As per GI.

## 2020-11-11 NOTE — PROGRESS NOTE ADULT - PROBLEM SELECTOR PLAN 1
+occult blood, anemia hgb ~5 from baseline 8-9. ddx: gastric ulcer vs gi malignancy given nausea/wt loss, vs colon malignancy   - gi deferred scope   - hb stable , restart ac as per GI

## 2020-11-11 NOTE — PROGRESS NOTE ADULT - SUBJECTIVE AND OBJECTIVE BOX
INTERVAL HPI/OVERNIGHT EVENTS:    MEDICATIONS  (STANDING):  atorvastatin 10 milliGRAM(s) Oral at bedtime  budesonide  80 MICROgram(s)/formoterol 4.5 MICROgram(s) Inhaler 2 Puff(s) Inhalation two times a day  metoprolol succinate ER 25 milliGRAM(s) Oral daily  pantoprazole  Injectable 40 milliGRAM(s) IV Push every 12 hours  senna 2 Tablet(s) Oral at bedtime    MEDICATIONS  (PRN):  ALBUTerol    90 MICROgram(s) HFA Inhaler 2 Puff(s) Inhalation every 6 hours PRN Shortness of Breath and/or Wheezing  bisacodyl 5 milliGRAM(s) Oral every 12 hours PRN Constipation  ondansetron Injectable 4 milliGRAM(s) IV Push every 6 hours PRN Nausea and/or Vomiting      Allergies    No Known Allergies    Intolerances            PHYSICAL EXAM:   Vital Signs:  Vital Signs Last 24 Hrs  T(C): 36.4 (11 Nov 2020 05:03), Max: 37 (10 Nov 2020 16:13)  T(F): 97.5 (11 Nov 2020 05:03), Max: 98.6 (10 Nov 2020 16:13)  HR: 99 (11 Nov 2020 05:03) (76 - 99)  BP: 158/83 (11 Nov 2020 05:03) (123/59 - 158/83)  BP(mean): --  RR: 17 (11 Nov 2020 05:03) (17 - 18)  SpO2: 94% (11 Nov 2020 05:03) (94% - 100%)  Daily     Daily     GENERAL:  no distress  HEENT:  NC/AT,  anicteric  CHEST:   no increased effort, breath sounds clear  HEART:  Regular rhythm  ABDOMEN:  Soft, non-tender, non-distended, normoactive bowel sounds,  no masses ,no hepato-splenomegaly, no signs of chronic liver disease  EXTEREMITIES:  no cyanosis      LABS:                        9.3    7.27  )-----------( 335      ( 10 Nov 2020 07:12 )             30.2     11-10    142  |  102  |  16  ----------------------------<  101<H>  3.3<L>   |  27  |  1.54<H>    Ca    8.5      10 Nov 2020 07:11            RADIOLOGY & ADDITIONAL TESTS:

## 2020-11-11 NOTE — CHART NOTE - NSCHARTNOTEFT_GEN_A_CORE
Restart Eliquis in am per Dr. Gr from cardiology. Restart Eliquis in am per Dr. Lyles from gastroenterology.

## 2020-11-11 NOTE — PROGRESS NOTE ADULT - ASSESSMENT
stable from GI perspective, no active bleeding  c/o chronic nasuea and feeling "sick"  symptoms are chronic and unresponsive to various meds including ondansetron, compazine, reglan, medical marijuana, EGD negative and head CT negative as well  supportive care  consider Gyn evaluation for abnormal appearing uterus on CT

## 2020-11-12 ENCOUNTER — TRANSCRIPTION ENCOUNTER (OUTPATIENT)
Age: 80
End: 2020-11-12

## 2020-11-12 VITALS
OXYGEN SATURATION: 95 % | DIASTOLIC BLOOD PRESSURE: 79 MMHG | HEART RATE: 79 BPM | SYSTOLIC BLOOD PRESSURE: 127 MMHG | RESPIRATION RATE: 18 BRPM | TEMPERATURE: 98 F

## 2020-11-12 RX ORDER — ASPIRIN/CALCIUM CARB/MAGNESIUM 324 MG
1 TABLET ORAL
Qty: 0 | Refills: 0 | DISCHARGE

## 2020-11-12 RX ORDER — SENNA PLUS 8.6 MG/1
2 TABLET ORAL
Qty: 0 | Refills: 0 | DISCHARGE
Start: 2020-11-12

## 2020-11-12 RX ORDER — AMLODIPINE BESYLATE 2.5 MG/1
1 TABLET ORAL
Qty: 0 | Refills: 0 | DISCHARGE

## 2020-11-12 RX ORDER — PANTOPRAZOLE SODIUM 20 MG/1
40 TABLET, DELAYED RELEASE ORAL
Qty: 0 | Refills: 0 | DISCHARGE
Start: 2020-11-12

## 2020-11-12 RX ORDER — METOPROLOL TARTRATE 50 MG
1 TABLET ORAL
Qty: 0 | Refills: 0 | DISCHARGE
Start: 2020-11-12

## 2020-11-12 RX ORDER — UMECLIDINIUM BROMIDE AND VILANTEROL TRIFENATATE 62.5; 25 UG/1; UG/1
1 POWDER RESPIRATORY (INHALATION)
Qty: 0 | Refills: 0 | DISCHARGE

## 2020-11-12 RX ORDER — GLYCOPYRROLATE AND FORMOTEROL FUMARATE 9; 4.8 UG/1; UG/1
2 AEROSOL, METERED RESPIRATORY (INHALATION)
Qty: 1 | Refills: 0
Start: 2020-11-12 | End: 2020-12-11

## 2020-11-12 RX ADMIN — PANTOPRAZOLE SODIUM 40 MILLIGRAM(S): 20 TABLET, DELAYED RELEASE ORAL at 05:53

## 2020-11-12 RX ADMIN — BUDESONIDE AND FORMOTEROL FUMARATE DIHYDRATE 2 PUFF(S): 160; 4.5 AEROSOL RESPIRATORY (INHALATION) at 05:53

## 2020-11-12 RX ADMIN — Medication 25 MILLIGRAM(S): at 05:53

## 2020-11-12 RX ADMIN — APIXABAN 2.5 MILLIGRAM(S): 2.5 TABLET, FILM COATED ORAL at 05:53

## 2020-11-12 NOTE — PROGRESS NOTE ADULT - SUBJECTIVE AND OBJECTIVE BOX
[facility] NEPHROLOGY- PROGRESS NOTE    80y Female with history of COPD on home O2, afib on AC presents with anemia on outpatient labs. Nephrology consulted for elevated Scr.    REVIEW OF SYSTEMS:  Gen: no changes in weight  Cards: no chest pain  Resp: no dyspnea  GI: no nausea or vomiting or diarrhea  Vascular: no LE edema    No Known Allergies      Hospital Medications: Medications reviewed      VITALS:  T(F): 97.7 (20 @ 12:27), Max: 97.9 (20 @ 21:26)  HR: 79 (20 @ 12:27)  BP: 127/79 (20 @ 12:27)  RR: 18 (20 @ 12:27)  SpO2: 95% (20 @ 12:27)  Wt(kg): --     @ 07:01  -   @ 07:00  --------------------------------------------------------  IN: 820 mL / OUT: 700 mL / NET: 120 mL     @ 07:01  -   @ 14:50  --------------------------------------------------------  IN: 600 mL / OUT: 0 mL / NET: 600 mL        PHYSICAL EXAM:    Gen: NAD, calm  Cards: RRR, +S1/S2, no M/G/R  Resp: CTA B/L  GI: soft, NT/ND, NABS  Vascular: no LE edema B/L      LABS:        Creatinine Trend: 1.54 <--, 1.57 <--, 1.57 <--, 1.69 <--    Urine Studies:  Urinalysis Basic - ( 2020 16:52 )    Color: Yellow / Appearance: Clear / S.020 / pH:   Gluc:  / Ketone: Negative  / Bili: Negative / Urobili: Negative   Blood:  / Protein: 30 mg/dL / Nitrite: Negative   Leuk Esterase: Large / RBC: 2 /hpf / WBC 11 /HPF   Sq Epi:  / Non Sq Epi: 2 /hpf / Bacteria: Negative      Sodium, Random Urine: 69 mmol/L ( @ 16:52)  Creatinine, Random Urine: 156 mg/dL ( @ 16:52)

## 2020-11-12 NOTE — PROGRESS NOTE ADULT - ASSESSMENT
pt high risk for gib.  would consdier keeping off a/c, but r/b/a needs to be discussed. explained to daughter, high risk endosopic evluaiton

## 2020-11-12 NOTE — PROGRESS NOTE ADULT - REASON FOR ADMISSION
anemia

## 2020-11-12 NOTE — DISCHARGE NOTE NURSING/CASE MANAGEMENT/SOCIAL WORK - PATIENT PORTAL LINK FT
You can access the FollowMyHealth Patient Portal offered by [facility] by registering at the following website: [email or web address]. By joining [employer]’s FollowMyHealth portal, you will also be able to view your health information using other applications (apps) compatible with our system.

## 2020-11-12 NOTE — PROGRESS NOTE ADULT - SUBJECTIVE AND OBJECTIVE BOX
SULMA [id number]  80yFemale  T(C): 36.4 (11-12-20 @ 05:30), Max: 36.9 (11-11-20 @ 09:18)  HR: 81 (11-12-20 @ 05:30) (72 - 86)  BP: 129/70 (11-12-20 @ 05:30) (97/58 - 134/70)  RR: 18 (11-12-20 @ 05:30) (16 - 18)  SpO2: 100% (11-12-20 @ 05:30) (95% - 100%)  Wt(kg): --  11-11 @ 07:01  -  11-12 @ 07:00  --------------------------------------------------------  IN: 820 mL / OUT: 700 mL / NET: 120 mL      normal cephalic atraumatic  s1s2   clear to ascultation bilaterally  soft, non tender, non distended no guarding or rebound  no clubbing cyanosis or edema

## 2020-11-12 NOTE — PROGRESS NOTE ADULT - ATTENDING COMMENTS
[facility] NEPHROLOGY  Luther Frias M.D.  Derrick Dee D.O.  Constanza Richter M.D.  Alize Garcia, MSN, ANP-C    Telephone: [phone]  Facsimile: [phone]    [address] [place] [address]
[facility] NEPHROLOGY  Luther Frias M.D.  Derrick Dee D.O.  Constanza Richter M.D.  Alize Garcia, MSN, ANP-C    Telephone: [phone]  Facsimile: [phone]    [address] [place] [address]
[facility] NEPHROLOGY  Luther Frias M.D.  Derrick Dee D.O.  Constanza Richter M.D.  Alize Garcia, MSN, ANP-C    Telephone: [phone]  Facsimile: [phone]    [address] [place] [address]
[facility] NEPHROLOGY  Luther Frias M.D.  Derrick Dee D.O.  Constanza Richter M.D.  Alize Garcia, MSN, ANP-C    Telephone: [phone]  Facsimile: [phone]    [address] [place] [address]
Patient with past medical history significant for PAF, CVA, and descending thoracic aortic aneurysm admitted with anemia. No obvious source of bleed. However need to hold anticoagulants given the profound anemia. Initiate beta blockers.

## 2020-11-12 NOTE — PROGRESS NOTE ADULT - ASSESSMENT
Assessment:    1. Anemia, resloving  2. PAF  3.  ?Malignacy    Plan.    1.  Patient seen and examined, stable from a cardiac stand point.  High risk for GIB and recommendations to stay off Eliquis as per GI.  Once again, discussed with patient of risk vs benefits.  She is verbalized her understanding and is accepting.   2.  Continue beta blockers  3.  GYN to follow       Thank you     Plan was discussed with Dr. Zahraa Mantilla    [facility]  Zahraa Mantilla D.O., [facility], KEVIN Vieyra, MSN, [facility]-[address], [address]  [place] [address]  [phone] Assessment:    1. Anemia, resloving  2. PAF  3.  ?Malignacy    Plan.    1.  Patient seen and examined, stable from a cardiac stand point.  High risk for GIB and recommendations to stay off Eliquis as per GI.  Once again, discussed with patient of risk vs benefits.  She is verbalized her understanding and is accepting.   Primary team to follow.   2.  Continue beta blockers  3.  GYN to follow       Thank you     Plan was discussed with Dr. Zahraa Mantilla    [facility]  Zahraa Mantilla D.O., [facility], KEVIN Vieyra, MSN, [facility]      [address], [address]  [place] [address]  [phone]

## 2020-11-12 NOTE — PROGRESS NOTE ADULT - NUTRITIONAL ASSESSMENT
This patient has been assessed with a concern for Malnutrition and has been determined to have a diagnosis/diagnoses of Severe protein-calorie malnutrition.    This patient is being managed with:   Diet DASH/TLC-  Sodium & Cholesterol Restricted  Entered: Nov 10 2020  8:39AM    
This patient has been assessed with a concern for Malnutrition and has been determined to have a diagnosis/diagnoses of Severe protein-calorie malnutrition.    This patient is being managed with:   Diet NPO-  Except Medications  With Ice Chips/Sips of Water  Entered: Nov 7 2020  2:31PM    
This patient has been assessed with a concern for Malnutrition and has been determined to have a diagnosis/diagnoses of Severe protein-calorie malnutrition.    This patient is being managed with:   Diet Clear Liquid-  Entered: Nov 9 2020  1:58PM    
This patient has been assessed with a concern for Malnutrition and has been determined to have a diagnosis/diagnoses of Severe protein-calorie malnutrition.    This patient is being managed with:   Diet DASH/TLC-  Sodium & Cholesterol Restricted  Entered: Nov 10 2020  8:39AM    The following pending diet order is being considered for treatment of Severe protein-calorie malnutrition:  Diet Soft-  Supplement Feeding Modality:  Oral  Ensure Enlive Cans or Servings Per Day:  2       Frequency:  Daily  Entered: Nov 10 2020 10:05AM  
This patient has been assessed with a concern for Malnutrition and has been determined to have a diagnosis/diagnoses of Severe protein-calorie malnutrition.    This patient is being managed with:   Diet NPO-  Except Medications  With Ice Chips/Sips of Water  Entered: Nov 7 2020  2:31PM    
This patient has been assessed with a concern for Malnutrition and has been determined to have a diagnosis/diagnoses of Severe protein-calorie malnutrition.    This patient is being managed with:   Diet Clear Liquid-  Entered: Nov 9 2020  1:58PM    
This patient has been assessed with a concern for Malnutrition and has been determined to have a diagnosis/diagnoses of Severe protein-calorie malnutrition.    This patient is being managed with:   Diet DASH/TLC-  Sodium & Cholesterol Restricted  Entered: Nov 10 2020  8:39AM    The following pending diet order is being considered for treatment of Severe protein-calorie malnutrition:  Diet Soft-  Supplement Feeding Modality:  Oral  Ensure Enlive Cans or Servings Per Day:  2       Frequency:  Daily  Entered: Nov 10 2020 10:05AM  
This patient has been assessed with a concern for Malnutrition and has been determined to have a diagnosis/diagnoses of Severe protein-calorie malnutrition.    This patient is being managed with:   Diet DASH/TLC-  Sodium & Cholesterol Restricted  Entered: Nov 10 2020  8:39AM    The following pending diet order is being considered for treatment of Severe protein-calorie malnutrition:  Diet Soft-  Supplement Feeding Modality:  Oral  Ensure Enlive Cans or Servings Per Day:  2       Frequency:  Daily  Entered: Nov 10 2020 10:05AM

## 2020-11-12 NOTE — PROGRESS NOTE ADULT - SUBJECTIVE AND OBJECTIVE BOX
CHIEF COMPLAINT:  comfortable    MEDICATIONS:  metoprolol succinate ER 25 milliGRAM(s) Oral daily      ALBUTerol    90 MICROgram(s) HFA Inhaler 2 Puff(s) Inhalation every 6 hours PRN  budesonide  80 MICROgram(s)/formoterol 4.5 MICROgram(s) Inhaler 2 Puff(s) Inhalation two times a day    ondansetron Injectable 4 milliGRAM(s) IV Push every 6 hours PRN    bisacodyl 5 milliGRAM(s) Oral every 12 hours PRN  pantoprazole  Injectable 40 milliGRAM(s) IV Push every 12 hours  senna 2 Tablet(s) Oral at bedtime    atorvastatin 10 milliGRAM(s) Oral at bedtime            Allergies    No Known Allergies    Intolerances    	        PHYSICAL EXAM:  T(C): 36.4 (11-12-20 @ 05:30), Max: 36.9 (11-11-20 @ 13:37)  HR: 81 (11-12-20 @ 05:30) (72 - 86)  BP: 129/70 (11-12-20 @ 05:30) (97/58 - 134/70)  RR: 18 (11-12-20 @ 05:30) (16 - 18)  SpO2: 100% (11-12-20 @ 05:30) (95% - 100%)  Wt(kg): --  I&O's Summary    11 Nov 2020 07:01  -  12 Nov 2020 07:00  --------------------------------------------------------  IN: 820 mL / OUT: 700 mL / NET: 120 mL        Appearance: Normal	  Cardiovascular: Normal S1 S2, No JVD, No murmurs, No edema  Respiratory: Lungs clear to auscultation	  Psychiatry: A & O x 3, Mood & affect appropriate  Gastrointestinal:  Soft, Non-tender, + BS	  Skin: No rashes, No ecchymoses, No cyanosis	  Neurologic: Non-focal  Extremities: Normal range of motion, No clubbing, cyanosis or edema      TELEMETRY: 	  nonmonitored    	  LABS:	 	  Reviewed                    proBNP:   Lipid Profile:   HgA1c:   TSH:

## 2020-11-21 RX ORDER — IRON SUCROSE 20 MG/ML
100 INJECTION, SOLUTION INTRAVENOUS
Qty: 0 | Refills: 0 | DISCHARGE
Start: 2020-11-21 | End: 2020-11-23

## 2020-11-22 ENCOUNTER — INPATIENT (INPATIENT)
Facility: HOSPITAL | Age: 80
LOS: 5 days | Discharge: HOME | DRG: 377 | End: 2020-11-28
Attending: INTERNAL MEDICINE | Admitting: INTERNAL MEDICINE
Payer: MEDICARE

## 2020-11-22 VITALS
SYSTOLIC BLOOD PRESSURE: 137 MMHG | DIASTOLIC BLOOD PRESSURE: 77 MMHG | HEIGHT: 63 IN | OXYGEN SATURATION: 100 % | WEIGHT: 110.01 LBS | RESPIRATION RATE: 16 BRPM | HEART RATE: 88 BPM | TEMPERATURE: 98 F

## 2020-11-22 DIAGNOSIS — Z98.890 OTHER SPECIFIED POSTPROCEDURAL STATES: Chronic | ICD-10-CM

## 2020-11-22 DIAGNOSIS — D64.9 ANEMIA, UNSPECIFIED: ICD-10-CM

## 2020-11-22 LAB
ALBUMIN SERPL ELPH-MCNC: 3.8 G/DL — SIGNIFICANT CHANGE UP (ref 3.3–5)
ALP SERPL-CCNC: 61 U/L — SIGNIFICANT CHANGE UP (ref 40–120)
ALT FLD-CCNC: 9 U/L — LOW (ref 10–45)
ANION GAP SERPL CALC-SCNC: 10 MMOL/L — SIGNIFICANT CHANGE UP (ref 5–17)
APTT BLD: 31.1 SEC — SIGNIFICANT CHANGE UP (ref 27.5–35.5)
AST SERPL-CCNC: 20 U/L — SIGNIFICANT CHANGE UP (ref 10–40)
BASE EXCESS BLDV CALC-SCNC: 8 MMOL/L — HIGH (ref -2–2)
BASOPHILS # BLD AUTO: 0.04 K/UL — SIGNIFICANT CHANGE UP (ref 0–0.2)
BASOPHILS NFR BLD AUTO: 0.5 % — SIGNIFICANT CHANGE UP (ref 0–2)
BILIRUB SERPL-MCNC: 0.8 MG/DL — SIGNIFICANT CHANGE UP (ref 0.2–1.2)
BLD GP AB SCN SERPL QL: POSITIVE — SIGNIFICANT CHANGE UP
BUN SERPL-MCNC: 28 MG/DL — HIGH (ref 7–23)
CA-I SERPL-SCNC: 1.18 MMOL/L — SIGNIFICANT CHANGE UP (ref 1.12–1.3)
CALCIUM SERPL-MCNC: 9.3 MG/DL — SIGNIFICANT CHANGE UP (ref 8.4–10.5)
CHLORIDE BLDV-SCNC: 103 MMOL/L — SIGNIFICANT CHANGE UP (ref 96–108)
CHLORIDE SERPL-SCNC: 103 MMOL/L — SIGNIFICANT CHANGE UP (ref 96–108)
CO2 BLDV-SCNC: 36 MMOL/L — HIGH (ref 22–30)
CO2 SERPL-SCNC: 32 MMOL/L — HIGH (ref 22–31)
CREAT SERPL-MCNC: 1.65 MG/DL — HIGH (ref 0.5–1.3)
DAT C3-SP REAG RBC QL: POSITIVE — SIGNIFICANT CHANGE UP
EOSINOPHIL # BLD AUTO: 0.39 K/UL — SIGNIFICANT CHANGE UP (ref 0–0.5)
EOSINOPHIL NFR BLD AUTO: 4.4 % — SIGNIFICANT CHANGE UP (ref 0–6)
GAS PNL BLDV: 144 MMOL/L — SIGNIFICANT CHANGE UP (ref 135–145)
GAS PNL BLDV: SIGNIFICANT CHANGE UP
GAS PNL BLDV: SIGNIFICANT CHANGE UP
GLUCOSE BLDV-MCNC: 95 MG/DL — SIGNIFICANT CHANGE UP (ref 70–99)
GLUCOSE SERPL-MCNC: 101 MG/DL — HIGH (ref 70–99)
HCO3 BLDV-SCNC: 34 MMOL/L — HIGH (ref 21–29)
HCT VFR BLD CALC: 26 % — LOW (ref 34.5–45)
HCT VFR BLDA CALC: 24 % — LOW (ref 39–50)
HGB BLD CALC-MCNC: 7.8 G/DL — LOW (ref 11.5–15.5)
HGB BLD-MCNC: 7.7 G/DL — LOW (ref 11.5–15.5)
IMM GRANULOCYTES NFR BLD AUTO: 0.5 % — SIGNIFICANT CHANGE UP (ref 0–1.5)
INR BLD: 1.08 RATIO — SIGNIFICANT CHANGE UP (ref 0.88–1.16)
LACTATE BLDV-MCNC: 1.7 MMOL/L — SIGNIFICANT CHANGE UP (ref 0.7–2)
LYMPHOCYTES # BLD AUTO: 1.64 K/UL — SIGNIFICANT CHANGE UP (ref 1–3.3)
LYMPHOCYTES # BLD AUTO: 18.6 % — SIGNIFICANT CHANGE UP (ref 13–44)
MCHC RBC-ENTMCNC: 26.1 PG — LOW (ref 27–34)
MCHC RBC-ENTMCNC: 29.6 GM/DL — LOW (ref 32–36)
MCV RBC AUTO: 88.1 FL — SIGNIFICANT CHANGE UP (ref 80–100)
MONOCYTES # BLD AUTO: 0.64 K/UL — SIGNIFICANT CHANGE UP (ref 0–0.9)
MONOCYTES NFR BLD AUTO: 7.3 % — SIGNIFICANT CHANGE UP (ref 2–14)
NEUTROPHILS # BLD AUTO: 6.06 K/UL — SIGNIFICANT CHANGE UP (ref 1.8–7.4)
NEUTROPHILS NFR BLD AUTO: 68.7 % — SIGNIFICANT CHANGE UP (ref 43–77)
NRBC # BLD: 0 /100 WBCS — SIGNIFICANT CHANGE UP (ref 0–0)
PCO2 BLDV: 62 MMHG — HIGH (ref 35–50)
PH BLDV: 7.36 — SIGNIFICANT CHANGE UP (ref 7.35–7.45)
PLATELET # BLD AUTO: 328 K/UL — SIGNIFICANT CHANGE UP (ref 150–400)
PO2 BLDV: <20 MMHG — LOW (ref 25–45)
POTASSIUM BLDV-SCNC: 4.2 MMOL/L — SIGNIFICANT CHANGE UP (ref 3.5–5.3)
POTASSIUM SERPL-MCNC: 4.2 MMOL/L — SIGNIFICANT CHANGE UP (ref 3.5–5.3)
POTASSIUM SERPL-SCNC: 4.2 MMOL/L — SIGNIFICANT CHANGE UP (ref 3.5–5.3)
PROT SERPL-MCNC: 6.7 G/DL — SIGNIFICANT CHANGE UP (ref 6–8.3)
PROTHROM AB SERPL-ACNC: 12.9 SEC — SIGNIFICANT CHANGE UP (ref 10.6–13.6)
RBC # BLD: 2.95 M/UL — LOW (ref 3.8–5.2)
RBC # FLD: 17.9 % — HIGH (ref 10.3–14.5)
RH IG SCN BLD-IMP: NEGATIVE — SIGNIFICANT CHANGE UP
SAO2 % BLDV: 21 % — LOW (ref 67–88)
SODIUM SERPL-SCNC: 145 MMOL/L — SIGNIFICANT CHANGE UP (ref 135–145)
WBC # BLD: 8.81 K/UL — SIGNIFICANT CHANGE UP (ref 3.8–10.5)
WBC # FLD AUTO: 8.81 K/UL — SIGNIFICANT CHANGE UP (ref 3.8–10.5)

## 2020-11-22 PROCEDURE — 99223 1ST HOSP IP/OBS HIGH 75: CPT

## 2020-11-22 PROCEDURE — 99291 CRITICAL CARE FIRST HOUR: CPT | Mod: CS

## 2020-11-22 RX ORDER — ATORVASTATIN CALCIUM 80 MG/1
1 TABLET, FILM COATED ORAL
Qty: 0 | Refills: 0 | DISCHARGE

## 2020-11-22 RX ORDER — ATORVASTATIN CALCIUM 80 MG/1
10 TABLET, FILM COATED ORAL AT BEDTIME
Refills: 0 | Status: DISCONTINUED | OUTPATIENT
Start: 2020-11-22 | End: 2020-11-28

## 2020-11-22 RX ORDER — ALBUTEROL 90 UG/1
2.5 AEROSOL, METERED ORAL ONCE
Refills: 0 | Status: COMPLETED | OUTPATIENT
Start: 2020-11-22 | End: 2020-11-22

## 2020-11-22 RX ORDER — PANTOPRAZOLE SODIUM 20 MG/1
1 TABLET, DELAYED RELEASE ORAL
Qty: 0 | Refills: 0 | DISCHARGE

## 2020-11-22 RX ORDER — GLYCOPYRROLATE AND FORMOTEROL FUMARATE 9; 4.8 UG/1; UG/1
2 AEROSOL, METERED RESPIRATORY (INHALATION)
Refills: 0 | Status: DISCONTINUED | OUTPATIENT
Start: 2020-11-22 | End: 2020-11-28

## 2020-11-22 RX ORDER — SODIUM CHLORIDE 9 MG/ML
1000 INJECTION, SOLUTION INTRAVENOUS
Refills: 0 | Status: DISCONTINUED | OUTPATIENT
Start: 2020-11-22 | End: 2020-11-28

## 2020-11-22 RX ORDER — ONDANSETRON 8 MG/1
4 TABLET, FILM COATED ORAL EVERY 6 HOURS
Refills: 0 | Status: DISCONTINUED | OUTPATIENT
Start: 2020-11-22 | End: 2020-11-28

## 2020-11-22 RX ORDER — FERROUS SULFATE 325(65) MG
1 TABLET ORAL
Qty: 0 | Refills: 0 | DISCHARGE

## 2020-11-22 RX ORDER — ONDANSETRON 8 MG/1
1 TABLET, FILM COATED ORAL
Qty: 0 | Refills: 0 | DISCHARGE

## 2020-11-22 RX ORDER — ACETAMINOPHEN 500 MG
650 TABLET ORAL EVERY 6 HOURS
Refills: 0 | Status: DISCONTINUED | OUTPATIENT
Start: 2020-11-22 | End: 2020-11-28

## 2020-11-22 RX ORDER — METOPROLOL TARTRATE 50 MG
25 TABLET ORAL DAILY
Refills: 0 | Status: DISCONTINUED | OUTPATIENT
Start: 2020-11-22 | End: 2020-11-28

## 2020-11-22 RX ORDER — ALBUTEROL 90 UG/1
2.5 AEROSOL, METERED ORAL EVERY 8 HOURS
Refills: 0 | Status: DISCONTINUED | OUTPATIENT
Start: 2020-11-22 | End: 2020-11-28

## 2020-11-22 RX ORDER — ACETAMINOPHEN 500 MG
2 TABLET ORAL
Qty: 0 | Refills: 0 | DISCHARGE

## 2020-11-22 RX ORDER — PANTOPRAZOLE SODIUM 20 MG/1
40 TABLET, DELAYED RELEASE ORAL EVERY 12 HOURS
Refills: 0 | Status: DISCONTINUED | OUTPATIENT
Start: 2020-11-22 | End: 2020-11-28

## 2020-11-22 RX ORDER — ALBUTEROL 90 UG/1
3 AEROSOL, METERED ORAL
Qty: 0 | Refills: 0 | DISCHARGE

## 2020-11-22 RX ADMIN — ALBUTEROL 2.5 MILLIGRAM(S): 90 AEROSOL, METERED ORAL at 18:29

## 2020-11-22 RX ADMIN — SODIUM CHLORIDE 60 MILLILITER(S): 9 INJECTION, SOLUTION INTRAVENOUS at 23:31

## 2020-11-22 RX ADMIN — PANTOPRAZOLE SODIUM 40 MILLIGRAM(S): 20 TABLET, DELAYED RELEASE ORAL at 23:31

## 2020-11-22 NOTE — ED PROVIDER NOTE - ATTENDING CONTRIBUTION TO CARE
79 yo F PMH COPD, chronic hypoxic respiratory failure on 4L NC, HTN, HLD, CVA, AFib on eliquis sent to ED from [facility] for evaluation of rectal bleeding. Per documentation from NH, patient is DNR and has been to hospital for this in the past. She does not want a colo / further GI work up and discussed with family. 81 yo F PMH COPD, chronic hypoxic respiratory failure on 4L NC, HTN, HLD, CVA, AFib on eliquis sent to ED from [facility] for evaluation of rectal bleeding. Per documentation from NH, patient is DNR and has been to hospital for this in the past. She does not want a colo / further GI work up and discussed with family. patient does not recall her last hospitalization and does not know why she was brought here. no recent bleeding.   Gen. no acute distress. elderly woman appears stated age.   HEENT:  perrl eomi.   Lungs:  b/l bs  CVS: S1S2   Abd;  non tender no distention  Ext: no edema or erythema  Neuro: awake, alert, oriented to person and place, not time  MSK: moves ext spon.

## 2020-11-22 NOTE — H&P ADULT - NSHPPHYSICALEXAM_GEN_ALL_CORE
Vital Signs Last 24 Hrs  T(C): 36.8 (23 Nov 2020 00:14), Max: 36.9 (22 Nov 2020 21:56)  T(F): 98.2 (23 Nov 2020 00:14), Max: 98.4 (22 Nov 2020 21:56)  HR: 90 (23 Nov 2020 00:14) (88 - 99)  BP: 153/72 (23 Nov 2020 00:14) (114/68 - 162/73)  BP(mean): --  RR: 20 (23 Nov 2020 00:14) (15 - 20)  SpO2: 99% (23 Nov 2020 00:14) (98% - 100%)    GENERAL: No acute distress, well-developed  HEAD:  Atraumatic, Normocephalic  EYES: EOMI, PERRLA, conjunctiva and sclera clear  ENT: Oral mucosa moist  NECK: Neck supple  CHEST/LUNG: Clear to auscultation bilaterally; No wheeze, no rales, no rhonchi.    HEART: Regular rate and rhythm; No murmurs, rubs, or gallops  ABDOMEN: Soft, Nontender, Nondistended; Bowel sounds present  EXTREMITIES:  No clubbing, cyanosis, or edema  VASCULAR: Posterior tibialis pulses intact bilaterally  PSYCH: Normal behavior, normal affect  NEUROLOGY: AAOx1  SKIN: grossly warm and dry

## 2020-11-22 NOTE — H&P ADULT - NSHPLABSRESULTS_GEN_ALL_CORE
Labs personally reviewed:                        7.7    8.81  )-----------( 328      ( 22 Nov 2020 12:30 )             26.0       11-22    145  |  103  |  28<H>  ----------------------------<  101<H>  4.2   |  32<H>  |  1.65<H>    Ca    9.3      22 Nov 2020 12:30    TPro  6.7  /  Alb  3.8  /  TBili  0.8  /  DBili  x   /  AST  20  /  ALT  9<L>  /  AlkPhos  61  11-22            LIVER FUNCTIONS - ( 22 Nov 2020 12:30 )  Alb: 3.8 g/dL / Pro: 6.7 g/dL / ALK PHOS: 61 U/L / ALT: 9 U/L / AST: 20 U/L / GGT: x             PT/INR - ( 22 Nov 2020 12:30 )   PT: 12.9 sec;   INR: 1.08 ratio         PTT - ( 22 Nov 2020 12:30 )  PTT:31.1 sec          cxr personally reviewed  · EKG Date/Time: 22-Nov-2020 12:17  · Rate: 83  · Interpretation: normal  · Axis: Normal  · RI: 138  · QRS: 66  · ST/T Wave: qtc 441

## 2020-11-22 NOTE — ED ADULT NURSE REASSESSMENT NOTE - NS ED NURSE REASSESS COMMENT FT1
initial type and screen sent to blood bank, blood bank states that they did not need a second specimen.  type and screen resulted in sunrise. blood bank request ordered.  upon picking up blood products, blood bank states that they need a second specimen.  PA Nicollette aware.

## 2020-11-22 NOTE — H&P ADULT - HISTORY OF PRESENT ILLNESS
81 yo F PMH COPD, chronic hypoxic respiratory failure on 4L NC, HTN, HLD, CVA, AFib on eliquis, recent admission here for anemia. 81 yo F PMH COPD, chronic hypoxic respiratory failure on 4L NC, HTN, HLD, CVA, AFib on eliquis, recent admission here for anemia now sent from nursing home by family due to recurrent weakness. Patient with dementia and is a poor historian, however patient's daughter had sent patient to hospital as she was concerned that they are not appropriately caring for patient's weakness. Patient reports continuing weakness similar to her chronic issues since her CVA. Denies any acute pain in abdomen or chest but as patient is a poor historian due to dementia unclear reliability of other history. On prior admission patient was felt not to be a candidate for invasive GI testing and she was managed conservatively with PPI and transfusion for GI bleed.

## 2020-11-22 NOTE — ED PROVIDER NOTE - PROGRESS NOTE DETAILS
Spoke to Hannah monique daughter. Reports she asked pt be bought to hospital because she was concerned of condition. She does not like nursing facility. Advised that over weekend extremely hard to have facility switched and cannot d/c pt home. Plan to transfuse to help symptoms and likely return to facility. Advised her to call SW at facility and  if desires can sign mother out upon return   Ginger Aguilar PA-C Spoke to Dr. Larry who sent pt to ED at daughter request. States that he is extremely comfortable caring for pt. States believes anemia is multifactorial and has been trending h/h and giving iron. Would not have sent pt to ED himself. Advised of plan to transfuse and likely return to facility endorses he is comfortable with plan and will determine management of a/c   Ginger Aguilar PA-C ATTG: : I spoke at length with pt and her daughter and she does not want to return to NH as she feels like she was not adequately being cared for. I spoke with hospital on call social work and discussed the case. patient and her daughter prefer to not return to nh and arrange for transfer to another facilty but instead to be admitted for further care and then find another facility. Unruly was notified pt is being admitted.

## 2020-11-22 NOTE — ED PROVIDER NOTE - OBJECTIVE STATEMENT
80 year old female w/ PMHx COPD, chronic hypoxic respiratory failure on 4L NC, HTN, HLD, CVA, AFib on eliquis, dementia presents to ED via EMS c/o nausea and generalized  weakness. Pt w/ recent admission 11/7-11/12 for fatigue  found to be anemic with hemoglobin 4.9 w/ presumed GIB with  no plans for endoscopic evaluation given increased risk due to medical co morbidities supportive care, PPI therapy per GI. She states that she feels she has no energy and has felt very nauseous. Does not recall recent admission. Denies any bleeding. Denies fevers, chills, chest pain, sob changed from baseline, abd pain, vomiting, diarrhea

## 2020-11-22 NOTE — ED ADULT NURSE REASSESSMENT NOTE - NS ED NURSE REASSESS COMMENT FT1
Infusion complete. Patient tolerated transfusion well. No reactions noted. Will re vital in 30 minutes.

## 2020-11-22 NOTE — ED ADULT NURSE NOTE - NSIMPLEMENTINTERV_GEN_ALL_ED
Implemented All Fall with Harm Risk Interventions:  [place] to call system. Call bell, personal items and telephone within reach. Instruct patient to call for assistance. Room bathroom lighting operational. Non-slip footwear when patient is off stretcher. Physically safe environment: no spills, clutter or unnecessary equipment. Stretcher in lowest position, wheels locked, appropriate side rails in place. Provide visual cue, wrist band, yellow gown, etc. Monitor gait and stability. Monitor for mental status changes and reorient to person, place, and time. Review medications for side effects contributing to fall risk. Reinforce activity limits and safety measures with patient and family. Provide visual clues: red socks.

## 2020-11-22 NOTE — H&P ADULT - PROBLEM SELECTOR PLAN 1
s/p 1 unit PRBCs  Check post transfusion CBC  Likely 2/2 GI source given recent admission  Will keep NPO for now and monitor stability of CBC to decide further care.

## 2020-11-22 NOTE — ED ADULT NURSE NOTE - OBJECTIVE STATEMENT
81 yo presents to the ED from nursing home. A&Ox4 c/o weakness and nausea. as per EMS report, pt daughter asked for pt to present to ED due to drop in hemoglobin. pt has history of anemia, not on blood thinners. had positive occult blood in previous admission to [facility], no intervention as per GI notes. pt has history of COPD, on 4L, denies acute change of SOB. pt denies any abd pain. pt denies vomiting but reports nausea. EMS reports pt is wheelchair/bed bound. no fever, chills. Patient undressed and placed into gown, call bell in hand and side rails up for safety. warm blanket provided, vital signs stable, pt in no acute distress.

## 2020-11-22 NOTE — H&P ADULT - PROBLEM SELECTOR PLAN 2
Hold AC given possible bleed for now to ensure stable HgB  Continue rate control with lopressor with hold parameters

## 2020-11-22 NOTE — ED PROVIDER NOTE - PMH
A-fib    COPD (chronic obstructive pulmonary disease)    CVA (cerebral vascular accident)    Dizziness    Former smoker    Gallstones    HTN (hypertension)    Hyperlipidemia    Stenosis of carotid artery  may 2017

## 2020-11-22 NOTE — H&P ADULT - ASSESSMENT
79 yo F PMH COPD, chronic hypoxic respiratory failure on 4L NC, HTN, HLD, CVA, AFib on eliquis, recent admission here for anemia now sent from nursing home by family due to recurrent weakness.

## 2020-11-22 NOTE — ED PROVIDER NOTE - PHYSICAL EXAMINATION
CONSTITUTIONAL: Patient is awake, alert. Patient is well appearing and in no acute distress on nc  HEAD: NCAT,   EYES: PERRL b/l, EOMI,   ENT: Airway patent, Nasal mucosa clear. Mouth with normal mucosa. Throat has no vesicles, no oropharyngeal exudates and uvula is midline.  NECK: supple, FROM  LUNGS: CTA B/L no wheezing or crackles   HEART: RRR.  ABDOMEN: Soft nd/nt, no rebound or guarding.   EXTREMITY: no edema or calf tenderness b/l, FROM upper and lower ext b/l  SKIN: with no rash or lesions.   NEURO: No focal deficits

## 2020-11-23 DIAGNOSIS — J44.9 CHRONIC OBSTRUCTIVE PULMONARY DISEASE, UNSPECIFIED: ICD-10-CM

## 2020-11-23 DIAGNOSIS — I48.91 UNSPECIFIED ATRIAL FIBRILLATION: ICD-10-CM

## 2020-11-23 DIAGNOSIS — I10 ESSENTIAL (PRIMARY) HYPERTENSION: ICD-10-CM

## 2020-11-23 DIAGNOSIS — D64.9 ANEMIA, UNSPECIFIED: ICD-10-CM

## 2020-11-23 LAB
ANION GAP SERPL CALC-SCNC: 9 MMOL/L — SIGNIFICANT CHANGE UP (ref 5–17)
BUN SERPL-MCNC: 25 MG/DL — HIGH (ref 7–23)
CALCIUM SERPL-MCNC: 9 MG/DL — SIGNIFICANT CHANGE UP (ref 8.4–10.5)
CHLORIDE SERPL-SCNC: 104 MMOL/L — SIGNIFICANT CHANGE UP (ref 96–108)
CO2 SERPL-SCNC: 31 MMOL/L — SIGNIFICANT CHANGE UP (ref 22–31)
CREAT SERPL-MCNC: 1.62 MG/DL — HIGH (ref 0.5–1.3)
GLUCOSE SERPL-MCNC: 113 MG/DL — HIGH (ref 70–99)
HCT VFR BLD CALC: 33.6 % — LOW (ref 34.5–45)
HGB BLD-MCNC: 10.2 G/DL — LOW (ref 11.5–15.5)
MCHC RBC-ENTMCNC: 26.2 PG — LOW (ref 27–34)
MCHC RBC-ENTMCNC: 30.4 GM/DL — LOW (ref 32–36)
MCV RBC AUTO: 86.4 FL — SIGNIFICANT CHANGE UP (ref 80–100)
NRBC # BLD: 0 /100 WBCS — SIGNIFICANT CHANGE UP (ref 0–0)
PLATELET # BLD AUTO: 292 K/UL — SIGNIFICANT CHANGE UP (ref 150–400)
POTASSIUM SERPL-MCNC: 4.4 MMOL/L — SIGNIFICANT CHANGE UP (ref 3.5–5.3)
POTASSIUM SERPL-SCNC: 4.4 MMOL/L — SIGNIFICANT CHANGE UP (ref 3.5–5.3)
RBC # BLD: 3.89 M/UL — SIGNIFICANT CHANGE UP (ref 3.8–5.2)
RBC # FLD: 18 % — HIGH (ref 10.3–14.5)
SARS-COV-2 IGG SERPL QL IA: NEGATIVE — SIGNIFICANT CHANGE UP
SARS-COV-2 IGM SERPL IA-ACNC: <0.1 INDEX — SIGNIFICANT CHANGE UP
SARS-COV-2 RNA SPEC QL NAA+PROBE: SIGNIFICANT CHANGE UP
SODIUM SERPL-SCNC: 144 MMOL/L — SIGNIFICANT CHANGE UP (ref 135–145)
WBC # BLD: 6.09 K/UL — SIGNIFICANT CHANGE UP (ref 3.8–10.5)
WBC # FLD AUTO: 6.09 K/UL — SIGNIFICANT CHANGE UP (ref 3.8–10.5)

## 2020-11-23 PROCEDURE — 99223 1ST HOSP IP/OBS HIGH 75: CPT

## 2020-11-23 PROCEDURE — 86077 PHYS BLOOD BANK SERV XMATCH: CPT

## 2020-11-23 RX ADMIN — Medication 25 MILLIGRAM(S): at 05:26

## 2020-11-23 RX ADMIN — GLYCOPYRROLATE AND FORMOTEROL FUMARATE 2 PUFF(S): 9; 4.8 AEROSOL, METERED RESPIRATORY (INHALATION) at 05:26

## 2020-11-23 RX ADMIN — PANTOPRAZOLE SODIUM 40 MILLIGRAM(S): 20 TABLET, DELAYED RELEASE ORAL at 05:25

## 2020-11-23 RX ADMIN — GLYCOPYRROLATE AND FORMOTEROL FUMARATE 2 PUFF(S): 9; 4.8 AEROSOL, METERED RESPIRATORY (INHALATION) at 17:31

## 2020-11-23 RX ADMIN — ATORVASTATIN CALCIUM 10 MILLIGRAM(S): 80 TABLET, FILM COATED ORAL at 02:03

## 2020-11-23 RX ADMIN — ATORVASTATIN CALCIUM 10 MILLIGRAM(S): 80 TABLET, FILM COATED ORAL at 22:14

## 2020-11-23 RX ADMIN — GLYCOPYRROLATE AND FORMOTEROL FUMARATE 2 PUFF(S): 9; 4.8 AEROSOL, METERED RESPIRATORY (INHALATION) at 01:55

## 2020-11-23 RX ADMIN — PANTOPRAZOLE SODIUM 40 MILLIGRAM(S): 20 TABLET, DELAYED RELEASE ORAL at 17:30

## 2020-11-23 RX ADMIN — ALBUTEROL 2.5 MILLIGRAM(S): 90 AEROSOL, METERED ORAL at 01:59

## 2020-11-23 NOTE — CHART NOTE - TREATMENT: THE FOLLOWING DIET HAS BEEN RECOMMENDED
Diet, Clear Liquid (11-23-20 @ 11:16) [Active]  Add promote x1 daily; add ensure clears x3 daily  Advance diet when medically feasible to Soft, low fiber

## 2020-11-23 NOTE — CONSULT NOTE ADULT - ASSESSMENT
pt with dementia and high risk for anesthesia, recomned keep off ac/  would avoid endosopic procedures as risk likely gerater than doc, will speak with family, can give regular diet

## 2020-11-23 NOTE — CONSULT NOTE ADULT - SUBJECTIVE AND OBJECTIVE BOX
Patient is a 80y Female     Patient is a 80y old  Female who presents with a chief complaint of Anemia 2/2 likely GI bleed (23 Nov 2020 15:56)      HPI:  79 yo F PMH COPD, chronic hypoxic respiratory failure on 4L NC, HTN, HLD, CVA, AFib on eliquis, recent admission here for anemia now sent from nursing home by family due to recurrent weakness. Patient with dementia and is a poor historian, however patient's daughter had sent patient to hospital as she was concerned that they are not appropriately caring for patient's weakness. Patient reports continuing weakness similar to her chronic issues since her CVA. Denies any acute pain in abdomen or chest but as patient is a poor historian due to dementia unclear reliability of other history. On prior admission patient was felt not to be a candidate for invasive GI testing and she was managed conservatively with PPI and transfusion for GI bleed.  (22 Nov 2020 22:27)      PAST MEDICAL & SURGICAL HISTORY:  HTN (hypertension)    A-fib    CVA (cerebral vascular accident)    Former smoker    Gallstones    Hyperlipidemia    Dizziness    Stenosis of carotid artery  may 2017    COPD (chronic obstructive pulmonary disease)    No significant past surgical history    H/O carotid endarterectomy        MEDICATIONS  (STANDING):  atorvastatin 10 milliGRAM(s) Oral at bedtime  dextrose 5% + sodium chloride 0.45%. 1000 milliLiter(s) (60 mL/Hr) IV Continuous <Continuous>  glycopyrrolate 9 MICROgram(s)/formoterol 4.8 MICROgram(s) Inhaler 2 Puff(s) Inhalation two times a day  metoprolol succinate ER 25 milliGRAM(s) Oral daily  pantoprazole  Injectable 40 milliGRAM(s) IV Push every 12 hours      Allergies    No Known Allergies    Intolerances        SOCIAL HISTORY:  Denies ETOh,Smoking,     FAMILY HISTORY:  No pertinent family history in first degree relatives        REVIEW OF SYSTEMS:    CONSTITUTIONAL: No weakness, fevers or chills  EYES/ENT: No visual changes;  No vertigo or throat pain   NECK: No pain or stiffness  RESPIRATORY: No cough, wheezing, hemoptysis; No shortness of breath  CARDIOVASCULAR: No chest pain or palpitations  GASTROINTESTINAL: No abdominal or epigastric pain. No nausea, vomiting, or hematemesis; No diarrhea or constipation. No melena or hematochezia.  GENITOURINARY: No dysuria, frequency or hematuria  NEUROLOGICAL: No numbness or weakness  SKIN: No itching, burning, rashes, or lesions   All other review of systems is negative unless indicated above.    VITAL:  T(C): , Max: 36.9 (11-22-20 @ 21:56)  T(F): , Max: 98.4 (11-22-20 @ 21:56)  HR: 83 (11-23-20 @ 12:36)  BP: 152/73 (11-23-20 @ 12:36)  BP(mean): --  RR: 18 (11-23-20 @ 12:36)  SpO2: 100% (11-23-20 @ 12:36)  Wt(kg): --    I and O's:    11-22 @ 07:01  -  11-23 @ 07:00  --------------------------------------------------------  IN: 0 mL / OUT: 150 mL / NET: -150 mL    11-23 @ 07:01  -  11-23 @ 18:48  --------------------------------------------------------  IN: 120 mL / OUT: 0 mL / NET: 120 mL      Height (cm): 162.6 (11-22 @ 21:56)  Weight (kg): 43.8 (11-22 @ 21:56)  BMI (kg/m2): 16.6 (11-22 @ 21:56)  BSA (m2): 1.44 (11-22 @ 21:56)    PHYSICAL EXAM:    Constitutional: NAD  HEENT: PERRLA,   Neck: No JVD  Respiratory: CTA B/L  Cardiovascular: S1 and S2  Gastrointestinal: BS+, soft, NT/ND  Extremities: No peripheral edema  Neurological: A/O x 3, no focal deficits  Psychiatric: Normal mood, normal affect  : No [place]  Skin: No rashes  Access: Not applicable  Back: No CVA tenderness    LABS:                        10.2   6.09  )-----------( 292      ( 23 Nov 2020 05:24 )             33.6     11-23    144  |  104  |  25<H>  ----------------------------<  113<H>  4.4   |  31  |  1.62<H>    Ca    9.0      23 Nov 2020 05:24    TPro  6.7  /  Alb  3.8  /  TBili  0.8  /  DBili  x   /  AST  20  /  ALT  9<L>  /  AlkPhos  61  11-22          RADIOLOGY & ADDITIONAL STUDIES:

## 2020-11-23 NOTE — DIETITIAN INITIAL EVALUATION ADULT. - ORAL INTAKE PTA/DIET HISTORY
patient states she is nauseaous "all the time". This has made her stop eating or eating much less. Patient's usual body weight is 118 lbs   1-2 months ago. Patient lives with different children who help her with her meals and she likes the arrangements, moving from 2 different homes. Patient  states she was eating well until about 1-2 months ago when she began with upset stomach

## 2020-11-23 NOTE — PROGRESS NOTE ADULT - ASSESSMENT
81 yo F PMH COPD, chronic hypoxic respiratory failure on 4L NC, HTN, HLD, CVA, AFib on eliquis, recent admission here for anemia now sent from nursing home by family due to recurrent weakness.

## 2020-11-23 NOTE — DIETITIAN INITIAL EVALUATION ADULT. - REASON INDICATOR FOR ASSESSMENT
BMI<19. "80 year old female w/ PMHx COPD, chronic hypoxic respiratory failure on 4L NC, HTN, HLD, CVA, AFib on eliquis, dementia presents to ED via EMS c/o nausea and generalized  weakness. Pt w/ recent admission 11/7-11/12 for fatigue  found to be anemic with hemoglobin 4.9 w/ presumed GIB."

## 2020-11-23 NOTE — CONSULT NOTE ADULT - SUBJECTIVE AND OBJECTIVE BOX
PULMONARY CONSULT NOTE      SULMA MEI  MRN-[id number]    Patient is a 80y old  Female who presents with a chief complaint of Anemia 2/2 likely GI bleed (23 Nov 2020 11:57)      HISTORY OF PRESENT ILLNESS:  81 yo known to Dr Cristobal for COPD on 02, HTN, HLD, CVA, AFib on eliquis,  brought in for weakness  she is poor historian- denies any complaints  eager for lunch  no resp distress  on her home 02 requirements          Allergies    No Known Allergies    Intolerances        PAST MEDICAL & SURdGICAL HISTORY:  HTN (hypertension)    A-fib    CVA (cerebral vascular accident)    Former smoker    Gallstones    Hyperlipidemia    Dizziness    Stenosis of carotid artery  may 2017    COPD (chronic obstructive pulmonary disease)    No significant past surgical history    H/O carotid endarterectomy            FAMILY HISTORY:  No pertinent family history in first degree relatives      Prescriptions:      SOCIAL HISTORY  Smoking History: 25 pack year    REVIEW OF SYSTEMS:   poor historian      Vital Signs Last 24 Hrs  T(C): 36.1 (23 Nov 2020 12:36), Max: 36.9 (22 Nov 2020 21:56)  T(F): 97 (23 Nov 2020 12:36), Max: 98.4 (22 Nov 2020 21:56)  HR: 83 (23 Nov 2020 12:36) (83 - 99)  BP: 152/73 (23 Nov 2020 12:36) (114/68 - 162/73)  BP(mean): --  RR: 18 (23 Nov 2020 12:36) (15 - 20)  SpO2: 100% (23 Nov 2020 12:36) (98% - 100%)    PHYSICAL EXAMINATION:    GENERAL: The patient is an elderly female in NAD    HEENT: Head is normocephalic and atraumatic.     NECK: Supple.     LUNGS:  some rhonchi; no wheezing  respirations unlabored     HEART: Regular rate and rhythm without murmur.    ABDOMEN: Soft, nontender, and nondistended.  No hepatosplenomegaly is noted.    EXTREMITIES: Without any cyanosis, clubbing, rash, lesions or edema.    NEUROLOGIC: Grossly intact      MEDICATIONS  (STANDING):  atorvastatin 10 milliGRAM(s) Oral at bedtime  dextrose 5% + sodium chloride 0.45%. 1000 milliLiter(s) (60 mL/Hr) IV Continuous <Continuous>  glycopyrrolate 9 MICROgram(s)/formoterol 4.8 MICROgram(s) Inhaler 2 Puff(s) Inhalation two times a day  metoprolol succinate ER 25 milliGRAM(s) Oral daily  pantoprazole  Injectable 40 milliGRAM(s) IV Push every 12 hours      MEDICATIONS  (PRN):  acetaminophen   Tablet .. 650 milliGRAM(s) Oral every 6 hours PRN Mild Pain (1 - 3)  ALBUTerol    0.083% 2.5 milliGRAM(s) Nebulizer every 8 hours PRN Shortness of Breath and/or Wheezing  ondansetron Injectable 4 milliGRAM(s) IV Push every 6 hours PRN Nausea and/or Vomiting        LABS:   CBC Full  -  ( 23 Nov 2020 05:24 )  WBC Count : 6.09 K/uL  RBC Count : 3.89 M/uL  Hemoglobin : 10.2 g/dL  Hematocrit : 33.6 %  Platelet Count - Automated : 292 K/uL  Mean Cell Volume : 86.4 fl  Mean Cell Hemoglobin : 26.2 pg  Mean Cell Hemoglobin Concentration : 30.4 gm/dL  Auto Neutrophil # : x  Auto Lymphocyte # : x  Auto Monocyte # : x  Auto Eosinophil # : x  Auto Basophil # : x  Auto Neutrophil % : x  Auto Lymphocyte % : x  Auto Monocyte % : x  Auto Eosinophil % : x  Auto Basophil % : x    PT/INR - ( 22 Nov 2020 12:30 )   PT: 12.9 sec;   INR: 1.08 ratio         PTT - ( 22 Nov 2020 12:30 )  PTT:31.1 sec  11-23    144  |  104  |  25<H>  ----------------------------<  113<H>  4.4   |  31  |  1.62<H>    Ca    9.0      23 Nov 2020 05:24    TPro  6.7  /  Alb  3.8  /  TBili  0.8  /  DBili  x   /  AST  20  /  ALT  9<L>  /  AlkPhos  61  11-22           RADIOLOGY & ADDITIONAL STUDIES:  1ra< from: Xray Chest 1 View- PORTABLE-Routine (Xray Chest 1 View- PORTABLE-Routine in AM.) (11.10.20 @ 09:04) >    EXAM:  XR CHEST PORTABLE ROUTINE 1V                            PROCEDURE DATE:  11/10/2020            INTERPRETATION:  A single chest x-ray was obtained on November 10, 2020.    Indication: COPD. Cough.    Impression:    The heart is normal in size. The lungs are hyperaerated. The lungs appear to be clear. No pleural effusion. No pneumothorax. No acute bony pathology could be identified.                KEENA ELLIS MD; Attending Radiologist  This document has been electronically signed. Nov10 2020  9:27AM    < end of copied text >    ECHO:   ec< from: TTE Echo Complete w/o Contrast w/ Doppler (08.03.20 @ 08:44) >  in size and structure.  Pulmonary Artery: The pulmonary artery is not well seen.  Venous: The pulmonary veins were not well visualized. The inferior vena cava was normal sized, with respiratory size variation greater than 50%.      Summary:   1. Left ventricular ejection fraction, by visual estimation, is >75%.   2. Hyperdynamic global left ventricular systolic function.   3. Increased LV wall thickness.   4. Spectral Doppler shows impaired relaxation pattern of left ventricular myocardial filling (Grade I diastolic dysfunction).   5. Mild mitral annular calcification.   6. Thickening and calcification of the anterior mitral valve leaflet.   7. Mild aortic regurgitation.   8. Sclerotic aortic valve with normal opening.    MD Malik Electronically signed on 8/3/2020 at 11:12:57 AM            < end of copied text >    ASSESSMENT:  81 yo with multiple medical problems admitted for weakness    PLAN:  per outpatient chart review she is on 2L  suggest taper down to 2 L  continue symbicort for now      Thank you for allowing me to participate in the care of this patient.  Please feel free to call me for any questions/concerns.      Cassidy Flores DO  [facility] Pulmonary/Sleep Medicine  [phone]

## 2020-11-23 NOTE — PROGRESS NOTE ADULT - SUBJECTIVE AND OBJECTIVE BOX
SULMA [id number]  80yFemale  T(C): 36.4 (11-23-20 @ 05:22), Max: 36.9 (11-22-20 @ 21:56)  HR: 90 (11-23-20 @ 05:22) (89 - 99)  BP: 155/51 (11-23-20 @ 05:22) (114/68 - 162/73)  RR: 19 (11-23-20 @ 05:22) (15 - 20)  SpO2: 100% (11-23-20 @ 05:22) (98% - 100%)  Wt(kg): --  11-22 @ 07:01  -  11-23 @ 07:00  --------------------------------------------------------  IN: 0 mL / OUT: 150 mL / NET: -150 mL

## 2020-11-23 NOTE — DIETITIAN INITIAL EVALUATION ADULT. - ADD RECOMMEND
add promote x1 daily; add ensure clears x3 daily; monitor/encourage PO intake. as tolerated' advance diet when medically feasible

## 2020-11-23 NOTE — DIETITIAN INITIAL EVALUATION ADULT. - PERTINENT MEDS FT
MEDICATIONS  (STANDING):  atorvastatin 10 milliGRAM(s) Oral at bedtime  dextrose 5% + sodium chloride 0.45%. 1000 milliLiter(s) (60 mL/Hr) IV Continuous <Continuous>  glycopyrrolate 9 MICROgram(s)/formoterol 4.8 MICROgram(s) Inhaler 2 Puff(s) Inhalation two times a day  metoprolol succinate ER 25 milliGRAM(s) Oral daily  pantoprazole  Injectable 40 milliGRAM(s) IV Push every 12 hours

## 2020-11-23 NOTE — PROGRESS NOTE ADULT - SUBJECTIVE AND OBJECTIVE BOX
SUBJECTIVE / OVERNIGHT EVENTS: pt seen and examined      MEDICATIONS  (STANDING):  atorvastatin 10 milliGRAM(s) Oral at bedtime  dextrose 5% + sodium chloride 0.45%. 1000 milliLiter(s) (60 mL/Hr) IV Continuous <Continuous>  glycopyrrolate 9 MICROgram(s)/formoterol 4.8 MICROgram(s) Inhaler 2 Puff(s) Inhalation two times a day  metoprolol succinate ER 25 milliGRAM(s) Oral daily  pantoprazole  Injectable 40 milliGRAM(s) IV Push every 12 hours    MEDICATIONS  (PRN):  acetaminophen   Tablet .. 650 milliGRAM(s) Oral every 6 hours PRN Mild Pain (1 - 3)  ALBUTerol    0.083% 2.5 milliGRAM(s) Nebulizer every 8 hours PRN Shortness of Breath and/or Wheezing  ondansetron Injectable 4 milliGRAM(s) IV Push every 6 hours PRN Nausea and/or Vomiting    Vital Signs Last 24 Hrs  T(C): 36.4 (23 Nov 2020 20:25), Max: 36.8 (23 Nov 2020 00:14)  T(F): 97.6 (23 Nov 2020 20:25), Max: 98.2 (23 Nov 2020 00:14)  HR: 83 (23 Nov 2020 20:25) (83 - 90)  BP: 139/57 (23 Nov 2020 20:25) (139/57 - 155/51)  BP(mean): --  RR: 18 (23 Nov 2020 20:25) (18 - 20)  SpO2: 98% (23 Nov 2020 20:25) (98% - 100%)    CAPILLARY BLOOD GLUCOSE        I&O's Summary    22 Nov 2020 07:01  -  23 Nov 2020 07:00  --------------------------------------------------------  IN: 0 mL / OUT: 150 mL / NET: -150 mL    23 Nov 2020 07:01  -  23 Nov 2020 22:52  --------------------------------------------------------  IN: 320 mL / OUT: 0 mL / NET: 320 mL        Constitutional: No fever, fatigue  Skin: No rash.  Eyes: No recent vision problems or eye pain.  ENT: No congestion, ear pain, or sore throat.  Cardiovascular: No chest pain or palpation.  Respiratory: No cough, shortness of breath, congestion, or wheezing.  Gastrointestinal: No abdominal pain, nausea, vomiting, or diarrhea.  Genitourinary: No dysuria.  Musculoskeletal: No joint swelling.  Neurologic: No headache.    PHYSICAL EXAM:  GENERAL: NAD  EYES: EOMI, PERRLA  NECK: Supple, No JVD  CHEST/LUNG: dec breath sounds at bases   HEART:  S1 , S2 +  ABDOMEN: soft , bs+  EXTREMITIES:  no edema  NEUROLOGY:alert awake      LABS:                        10.2   6.09  )-----------( 292      ( 23 Nov 2020 05:24 )             33.6     11-23    144  |  104  |  25<H>  ----------------------------<  113<H>  4.4   |  31  |  1.62<H>    Ca    9.0      23 Nov 2020 05:24    TPro  6.7  /  Alb  3.8  /  TBili  0.8  /  DBili  x   /  AST  20  /  ALT  9<L>  /  AlkPhos  61  11-22    PT/INR - ( 22 Nov 2020 12:30 )   PT: 12.9 sec;   INR: 1.08 ratio         PTT - ( 22 Nov 2020 12:30 )  PTT:31.1 sec          RADIOLOGY & ADDITIONAL TESTS:    Imaging Personally Reviewed:    Consultant(s) Notes Reviewed:      Care Discussed with Consultants/Other Providers:

## 2020-11-24 LAB
ANION GAP SERPL CALC-SCNC: 9 MMOL/L — SIGNIFICANT CHANGE UP (ref 5–17)
BUN SERPL-MCNC: 14 MG/DL — SIGNIFICANT CHANGE UP (ref 7–23)
CALCIUM SERPL-MCNC: 8.6 MG/DL — SIGNIFICANT CHANGE UP (ref 8.4–10.5)
CHLORIDE SERPL-SCNC: 104 MMOL/L — SIGNIFICANT CHANGE UP (ref 96–108)
CO2 SERPL-SCNC: 30 MMOL/L — SIGNIFICANT CHANGE UP (ref 22–31)
CREAT SERPL-MCNC: 1.49 MG/DL — HIGH (ref 0.5–1.3)
GLUCOSE SERPL-MCNC: 103 MG/DL — HIGH (ref 70–99)
HCT VFR BLD CALC: 31.6 % — LOW (ref 34.5–45)
HGB BLD-MCNC: 9.5 G/DL — LOW (ref 11.5–15.5)
MCHC RBC-ENTMCNC: 26.5 PG — LOW (ref 27–34)
MCHC RBC-ENTMCNC: 30.1 GM/DL — LOW (ref 32–36)
MCV RBC AUTO: 88 FL — SIGNIFICANT CHANGE UP (ref 80–100)
NRBC # BLD: 0 /100 WBCS — SIGNIFICANT CHANGE UP (ref 0–0)
PLATELET # BLD AUTO: 278 K/UL — SIGNIFICANT CHANGE UP (ref 150–400)
POTASSIUM SERPL-MCNC: 3.6 MMOL/L — SIGNIFICANT CHANGE UP (ref 3.5–5.3)
POTASSIUM SERPL-SCNC: 3.6 MMOL/L — SIGNIFICANT CHANGE UP (ref 3.5–5.3)
RBC # BLD: 3.59 M/UL — LOW (ref 3.8–5.2)
RBC # FLD: 17.6 % — HIGH (ref 10.3–14.5)
SODIUM SERPL-SCNC: 143 MMOL/L — SIGNIFICANT CHANGE UP (ref 135–145)
WBC # BLD: 5.6 K/UL — SIGNIFICANT CHANGE UP (ref 3.8–10.5)
WBC # FLD AUTO: 5.6 K/UL — SIGNIFICANT CHANGE UP (ref 3.8–10.5)

## 2020-11-24 PROCEDURE — 99232 SBSQ HOSP IP/OBS MODERATE 35: CPT

## 2020-11-24 RX ADMIN — GLYCOPYRROLATE AND FORMOTEROL FUMARATE 2 PUFF(S): 9; 4.8 AEROSOL, METERED RESPIRATORY (INHALATION) at 17:47

## 2020-11-24 RX ADMIN — SODIUM CHLORIDE 60 MILLILITER(S): 9 INJECTION, SOLUTION INTRAVENOUS at 05:27

## 2020-11-24 RX ADMIN — PANTOPRAZOLE SODIUM 40 MILLIGRAM(S): 20 TABLET, DELAYED RELEASE ORAL at 17:47

## 2020-11-24 RX ADMIN — GLYCOPYRROLATE AND FORMOTEROL FUMARATE 2 PUFF(S): 9; 4.8 AEROSOL, METERED RESPIRATORY (INHALATION) at 05:27

## 2020-11-24 RX ADMIN — PANTOPRAZOLE SODIUM 40 MILLIGRAM(S): 20 TABLET, DELAYED RELEASE ORAL at 05:27

## 2020-11-24 RX ADMIN — ALBUTEROL 2.5 MILLIGRAM(S): 90 AEROSOL, METERED ORAL at 16:26

## 2020-11-24 RX ADMIN — ATORVASTATIN CALCIUM 10 MILLIGRAM(S): 80 TABLET, FILM COATED ORAL at 21:29

## 2020-11-24 RX ADMIN — Medication 25 MILLIGRAM(S): at 05:27

## 2020-11-24 NOTE — PROGRESS NOTE ADULT - ASSESSMENT
hungry, otherwise no complaints, on clears, no signs of active bleeding  CT scans suggest enlarging uterine lesion, no GI pathology  pt at increased risk for colonoscopy and has been resistant in the past, EGD negative.   can advance diet, hold anticoagulation, supportive care, will discuss overall plan with daughters

## 2020-11-24 NOTE — CONSULT NOTE ADULT - ATTENDING COMMENTS
[facility] NEPHROLOGY  Luther Frias M.D.  Derrick Dee D.O.  Constanza Richter M.D.  Alize Garcia, MSN, ANP-C    Telephone: [phone]  Facsimile: [phone]    [address] [place] [address]

## 2020-11-24 NOTE — PHYSICAL THERAPY INITIAL EVALUATION ADULT - ADDITIONAL COMMENTS
social history: Prior to admission pt was living at rehab center. prior to that pt lives with daughter in private house, no stairs to negotiate. pt amb independently with rolling walker, owns wheelchair for long distances. daughter assists with ADLs. states she is going home with home health aide upon DC>

## 2020-11-24 NOTE — CONSULT NOTE ADULT - ASSESSMENT
80 year-old woman with NÉSTOR in the setting of symptomatic anemia.    1. NÉSTOR- hemodynamically-mediated in the setting of symptomatic anemia s/p PRBCs. Renal fxn improving. Continue to monitor renal fxn. Transfuse as necessary.  2. HTN- BP mostly controlled. Continue with current anti-hypertensive medications. Monitor BP.  3. Anemia- in the setting of GIB.  Pt is s/p PRBC. F/u GI. Transfusion as necessary.

## 2020-11-24 NOTE — PHYSICAL THERAPY INITIAL EVALUATION ADULT - CRITERIA FOR SKILLED THERAPEUTIC INTERVENTIONS
impairments found/anticipated equipment needs at discharge/therapy frequency/anticipated discharge recommendation/functional limitations in following categories/predicted duration of therapy intervention

## 2020-11-24 NOTE — PROGRESS NOTE ADULT - SUBJECTIVE AND OBJECTIVE BOX
PULMONARY PROGRESS NOTE    SULMA MEI  MRN-[id number]    Patient is a 80y old  Female who presents with a chief complaint of Anemia 2/2 likely GI bleed (24 Nov 2020 08:26)      HPI:  comfortable this morning  remains on 02      ROS:   -    ACTIVE MEDICATION LIST:  MEDICATIONS  (STANDING):  atorvastatin 10 milliGRAM(s) Oral at bedtime  dextrose 5% + sodium chloride 0.45%. 1000 milliLiter(s) (60 mL/Hr) IV Continuous <Continuous>  glycopyrrolate 9 MICROgram(s)/formoterol 4.8 MICROgram(s) Inhaler 2 Puff(s) Inhalation two times a day  metoprolol succinate ER 25 milliGRAM(s) Oral daily  pantoprazole  Injectable 40 milliGRAM(s) IV Push every 12 hours    MEDICATIONS  (PRN):  acetaminophen   Tablet .. 650 milliGRAM(s) Oral every 6 hours PRN Mild Pain (1 - 3)  ALBUTerol    0.083% 2.5 milliGRAM(s) Nebulizer every 8 hours PRN Shortness of Breath and/or Wheezing  ondansetron Injectable 4 milliGRAM(s) IV Push every 6 hours PRN Nausea and/or Vomiting      EXAM:  Vital Signs Last 24 Hrs  T(C): 36.6 (24 Nov 2020 04:45), Max: 36.6 (24 Nov 2020 04:45)  T(F): 97.9 (24 Nov 2020 04:45), Max: 97.9 (24 Nov 2020 04:45)  HR: 89 (24 Nov 2020 08:20) (83 - 93)  BP: 144/64 (24 Nov 2020 04:45) (139/57 - 152/73)  BP(mean): --  RR: 18 (24 Nov 2020 08:20) (18 - 18)  SpO2: 98% (24 Nov 2020 08:20) (98% - 100%)    GENERAL: The patient is awake and alert in no apparent distress.     LUNGS: Clear to auscultation without wheezing, rales or rhonchi; respirations unlabored    HEART: Regular rate and rhythm without murmur.                            9.5    5.60  )-----------( 278      ( 24 Nov 2020 07:15 )             31.6       11-24    143  |  104  |  14  ----------------------------<  103<H>  3.6   |  30  |  1.49<H>    Ca    8.6      24 Nov 2020 07:10    TPro  6.7  /  Alb  3.8  /  TBili  0.8  /  DBili  x   /  AST  20  /  ALT  9<L>  /  AlkPhos  61  11-22     rad< from: Xray Chest 1 View- PORTABLE-Routine (Xray Chest 1 View- PORTABLE-Routine in AM.) (11.10.20 @ 09:04) >    EXAM:  XR CHEST PORTABLE ROUTINE 1V                            PROCEDURE DATE:  11/10/2020            INTERPRETATION:  A single chest x-ray was obtained on November 10, 2020.    Indication: COPD. Cough.    Impression:    The heart is normal in size. The lungs are hyperaerated. The lungs appear to be clear. No pleural effusion. No pneumothorax. No acute bony pathology could be identified.                KEENA ELLIS MD; Attending Radiologist    < end of copied text >    PROBLEM LIST:  80y Female with HEALTH ISSUES - PROBLEM Dx:  Essential hypertension  Essential hypertension    Chronic obstructive pulmonary disease, unspecified COPD type  Chronic obstructive pulmonary disease, unspecified COPD type    Atrial fibrillation, unspecified type  Atrial fibrillation, unspecified type    Symptomatic anemia  Symptomatic anemia              RECS:  continue Bevespi  nebs prn  taper 02 to 2L      Please call with any questions.    Cassidy Flores DO  [facility] Pulmonary/Sleep Medicine  [phone]

## 2020-11-24 NOTE — PROGRESS NOTE ADULT - SUBJECTIVE AND OBJECTIVE BOX
INTERVAL HPI/OVERNIGHT EVENTS:    MEDICATIONS  (STANDING):  atorvastatin 10 milliGRAM(s) Oral at bedtime  dextrose 5% + sodium chloride 0.45%. 1000 milliLiter(s) (60 mL/Hr) IV Continuous <Continuous>  glycopyrrolate 9 MICROgram(s)/formoterol 4.8 MICROgram(s) Inhaler 2 Puff(s) Inhalation two times a day  metoprolol succinate ER 25 milliGRAM(s) Oral daily  pantoprazole  Injectable 40 milliGRAM(s) IV Push every 12 hours    MEDICATIONS  (PRN):  acetaminophen   Tablet .. 650 milliGRAM(s) Oral every 6 hours PRN Mild Pain (1 - 3)  ALBUTerol    0.083% 2.5 milliGRAM(s) Nebulizer every 8 hours PRN Shortness of Breath and/or Wheezing  ondansetron Injectable 4 milliGRAM(s) IV Push every 6 hours PRN Nausea and/or Vomiting      Allergies    No Known Allergies    Intolerances            PHYSICAL EXAM:   Vital Signs:  Vital Signs Last 24 Hrs  T(C): 36.6 (24 Nov 2020 04:45), Max: 36.6 (24 Nov 2020 04:45)  T(F): 97.9 (24 Nov 2020 04:45), Max: 97.9 (24 Nov 2020 04:45)  HR: 89 (24 Nov 2020 08:20) (83 - 93)  BP: 144/64 (24 Nov 2020 04:45) (139/57 - 152/73)  BP(mean): --  RR: 18 (24 Nov 2020 08:20) (18 - 18)  SpO2: 98% (24 Nov 2020 08:20) (98% - 100%)  Daily     Daily     GENERAL:  no distress  HEENT:  NC/AT,  anicteric  CHEST:   no increased effort, breath sounds clear  HEART:  Regular rhythm  ABDOMEN:  Soft, non-tender, non-distended, normoactive bowel sounds,  no masses ,no hepato-splenomegaly, no signs of chronic liver disease  EXTEREMITIES:  no cyanosis      LABS:                        9.5    5.60  )-----------( 278      ( 24 Nov 2020 07:15 )             31.6     11-24    143  |  104  |  14  ----------------------------<  103<H>  3.6   |  30  |  1.49<H>    Ca    8.6      24 Nov 2020 07:10    TPro  6.7  /  Alb  3.8  /  TBili  0.8  /  DBili  x   /  AST  20  /  ALT  9<L>  /  AlkPhos  61  11-22    PT/INR - ( 22 Nov 2020 12:30 )   PT: 12.9 sec;   INR: 1.08 ratio         PTT - ( 22 Nov 2020 12:30 )  PTT:31.1 sec      RADIOLOGY & ADDITIONAL TESTS:

## 2020-11-24 NOTE — PHYSICAL THERAPY INITIAL EVALUATION ADULT - ACTIVE RANGE OF MOTION EXAMINATION, REHAB EVAL
bilateral  lower extremity Active ROM was WFL (within functional limits)/B shoulder flex ~100degrees/bilateral upper extremity Active ROM was WFL (within functional limits)

## 2020-11-24 NOTE — PROGRESS NOTE ADULT - SUBJECTIVE AND OBJECTIVE BOX
SUBJECTIVE / OVERNIGHT EVENTS: pt seen and examined    MEDICATIONS  (STANDING):  atorvastatin 10 milliGRAM(s) Oral at bedtime  dextrose 5% + sodium chloride 0.45%. 1000 milliLiter(s) (60 mL/Hr) IV Continuous <Continuous>  glycopyrrolate 9 MICROgram(s)/formoterol 4.8 MICROgram(s) Inhaler 2 Puff(s) Inhalation two times a day  metoprolol succinate ER 25 milliGRAM(s) Oral daily  pantoprazole  Injectable 40 milliGRAM(s) IV Push every 12 hours    MEDICATIONS  (PRN):  acetaminophen   Tablet .. 650 milliGRAM(s) Oral every 6 hours PRN Mild Pain (1 - 3)  ALBUTerol    0.083% 2.5 milliGRAM(s) Nebulizer every 8 hours PRN Shortness of Breath and/or Wheezing  ondansetron Injectable 4 milliGRAM(s) IV Push every 6 hours PRN Nausea and/or Vomiting    Vital Signs Last 24 Hrs  T(C): 36.9 (24 Nov 2020 21:26), Max: 36.9 (24 Nov 2020 21:26)  T(F): 98.5 (24 Nov 2020 21:26), Max: 98.5 (24 Nov 2020 21:26)  HR: 86 (24 Nov 2020 21:26) (79 - 93)  BP: 129/57 (24 Nov 2020 21:26) (129/57 - 158/72)  BP(mean): --  RR: 18 (24 Nov 2020 21:26) (18 - 18)  SpO2: 98% (24 Nov 2020 14:35) (98% - 99%)      Constitutional: No fever, fatigue  Skin: No rash.  Eyes: No recent vision problems or eye pain.  ENT: No congestion, ear pain, or sore throat.  Cardiovascular: No chest pain or palpation.  Respiratory: No cough, shortness of breath, congestion, or wheezing.  Gastrointestinal: No abdominal pain, nausea, vomiting, or diarrhea.  Genitourinary: No dysuria.  Musculoskeletal: No joint swelling.  Neurologic: No headache.    PHYSICAL EXAM:  GENERAL: NAD  EYES: EOMI, PERRLA  NECK: Supple, No JVD  CHEST/LUNG: dec breath sounds at bases   HEART:  S1 , S2 +  ABDOMEN: soft , bs+  EXTREMITIES:  no edema  NEUROLOGY:alert awake      LABS:  11-24    143  |  104  |  14  ----------------------------<  103<H>  3.6   |  30  |  1.49<H>    Ca    8.6      24 Nov 2020 07:10      Creatinine Trend: 1.49 <--, 1.62 <--, 1.65 <--                        9.5    5.60  )-----------( 278      ( 24 Nov 2020 07:15 )             31.6     Urine Studies:                        RADIOLOGY & ADDITIONAL TESTS:    Imaging Personally Reviewed:    Consultant(s) Notes Reviewed:      Care Discussed with Consultants/Other Providers:

## 2020-11-24 NOTE — CONSULT NOTE ADULT - SUBJECTIVE AND OBJECTIVE BOX
[facility] NEPHROLOGY- CONSULTATION NOTE    Patient is a 80y Female with COPD with chronic respiratory failure on home O2, lives at NH, who presented to the hospital with generalized weakness found to have anemia with suspected GIB.  Baseline creatinine is ~1.    Pt feels weak/tired overall.  No other complaints.    PAST MEDICAL & SURGICAL HISTORY:  HTN (hypertension)    A-fib    CVA (cerebral vascular accident)    Former smoker    Gallstones    Hyperlipidemia    Dizziness    Stenosis of carotid artery  may 2017    COPD (chronic obstructive pulmonary disease)    No significant past surgical history    H/O carotid endarterectomy      No Known Allergies    Home Medications Reviewed  Hospital Medications:   MEDICATIONS  (STANDING):  atorvastatin 10 milliGRAM(s) Oral at bedtime  dextrose 5% + sodium chloride 0.45%. 1000 milliLiter(s) (60 mL/Hr) IV Continuous <Continuous>  glycopyrrolate 9 MICROgram(s)/formoterol 4.8 MICROgram(s) Inhaler 2 Puff(s) Inhalation two times a day  metoprolol succinate ER 25 milliGRAM(s) Oral daily  pantoprazole  Injectable 40 milliGRAM(s) IV Push every 12 hours    SOCIAL HISTORY:  Denies ETOh,Smoking,   FAMILY HISTORY:  No pertinent family history in first degree relatives      REVIEW OF SYSTEMS:  CONSTITUTIONAL: +weakness,no  fevers or chills  EYES/ENT: No visual changes;  No vertigo or throat pain   NECK: No pain or stiffness  RESPIRATORY: No cough, wheezing, hemoptysis; No shortness of breath  CARDIOVASCULAR: No chest pain or palpitations.  GASTROINTESTINAL: No abdominal or epigastric pain. No nausea, vomiting, or hematemesis; No diarrhea or constipation. No melena or hematochezia.  GENITOURINARY: No dysuria, frequency, foamy urine, urinary urgency, incontinence or hematuria  NEUROLOGICAL: No numbness or weakness  SKIN: No itching, burning, rashes, or lesions   VASCULAR: No bilateral lower extremity edema.   All other review of systems is negative unless indicated above.    VITALS:  T(F): 98.3 (11-24-20 @ 11:48), Max: 98.3 (11-24-20 @ 11:48)  HR: 80 (11-24-20 @ 14:35)  BP: 138/72 (11-24-20 @ 14:35)  RR: 18 (11-24-20 @ 14:35)  SpO2: 98% (11-24-20 @ 14:35)  Wt(kg): --    11-23 @ 07:01  -  11-24 @ 07:00  --------------------------------------------------------  IN: 2093 mL / OUT: 0 mL / NET: 2093 mL    11-24 @ 07:01  -  11-24 @ 18:23  --------------------------------------------------------  IN: 560 mL / OUT: 0 mL / NET: 560 mL      PHYSICAL EXAM:  Constitutional: NAD  HEENT: anicteric sclera, oropharynx clear, MMM  Neck: No JVD  Respiratory: CTAB, no wheezes, rales or rhonchi  Cardiovascular: S1, S2, RRR  Gastrointestinal: BS+, soft, NT/ND  Extremities: No cyanosis or clubbing. No peripheral edema  Neurological: A/O x 2, no focal deficits,  Psychiatric: calm, confused  : No CVA tenderness. No morales.   Skin: No rashes  Vascular Access:    LABS:  11-24    143  |  104  |  14  ----------------------------<  103<H>  3.6   |  30  |  1.49<H>    Ca    8.6      24 Nov 2020 07:10      Creatinine Trend: 1.49 <--, 1.62 <--, 1.65 <--                        9.5    5.60  )-----------( 278      ( 24 Nov 2020 07:15 )             31.6         RADIOLOGY & ADDITIONAL STUDIES:      < from: Xray Chest 1 View- PORTABLE-Routine (Xray Chest 1 View- PORTABLE-Routine in AM.) (11.10.20 @ 09:04) >    EXAM:  XR CHEST PORTABLE ROUTINE 1V                            PROCEDURE DATE:  11/10/2020            INTERPRETATION:  A single chest x-ray was obtained on November 10, 2020.    Indication: COPD. Cough.    Impression:    The heart is normal in size. The lungs are hyperaerated. The lungs appear to be clear. No pleural effusion. No pneumothorax. No acute bony pathology could be identified.                KEENA ELLIS MD; Attending Radiologist  This document has been electronically signed. Nov10 2020  9:27AM    < end of copied text >

## 2020-11-25 LAB
HCT VFR BLD CALC: 31 % — LOW (ref 34.5–45)
HGB BLD-MCNC: 9.6 G/DL — LOW (ref 11.5–15.5)
MCHC RBC-ENTMCNC: 27.1 PG — SIGNIFICANT CHANGE UP (ref 27–34)
MCHC RBC-ENTMCNC: 31 GM/DL — LOW (ref 32–36)
MCV RBC AUTO: 87.6 FL — SIGNIFICANT CHANGE UP (ref 80–100)
NRBC # BLD: 0 /100 WBCS — SIGNIFICANT CHANGE UP (ref 0–0)
PLATELET # BLD AUTO: 273 K/UL — SIGNIFICANT CHANGE UP (ref 150–400)
RBC # BLD: 3.54 M/UL — LOW (ref 3.8–5.2)
RBC # FLD: 17.4 % — HIGH (ref 10.3–14.5)
WBC # BLD: 6.32 K/UL — SIGNIFICANT CHANGE UP (ref 3.8–10.5)
WBC # FLD AUTO: 6.32 K/UL — SIGNIFICANT CHANGE UP (ref 3.8–10.5)

## 2020-11-25 PROCEDURE — 99232 SBSQ HOSP IP/OBS MODERATE 35: CPT

## 2020-11-25 RX ADMIN — ALBUTEROL 2.5 MILLIGRAM(S): 90 AEROSOL, METERED ORAL at 21:05

## 2020-11-25 RX ADMIN — Medication 25 MILLIGRAM(S): at 05:04

## 2020-11-25 RX ADMIN — SODIUM CHLORIDE 60 MILLILITER(S): 9 INJECTION, SOLUTION INTRAVENOUS at 11:28

## 2020-11-25 RX ADMIN — Medication 100 MILLIGRAM(S): at 18:57

## 2020-11-25 RX ADMIN — Medication 1 TABLET(S): at 18:57

## 2020-11-25 RX ADMIN — ALBUTEROL 2.5 MILLIGRAM(S): 90 AEROSOL, METERED ORAL at 12:08

## 2020-11-25 RX ADMIN — SODIUM CHLORIDE 60 MILLILITER(S): 9 INJECTION, SOLUTION INTRAVENOUS at 05:06

## 2020-11-25 RX ADMIN — PANTOPRAZOLE SODIUM 40 MILLIGRAM(S): 20 TABLET, DELAYED RELEASE ORAL at 05:04

## 2020-11-25 RX ADMIN — ATORVASTATIN CALCIUM 10 MILLIGRAM(S): 80 TABLET, FILM COATED ORAL at 21:05

## 2020-11-25 RX ADMIN — GLYCOPYRROLATE AND FORMOTEROL FUMARATE 2 PUFF(S): 9; 4.8 AEROSOL, METERED RESPIRATORY (INHALATION) at 16:42

## 2020-11-25 RX ADMIN — PANTOPRAZOLE SODIUM 40 MILLIGRAM(S): 20 TABLET, DELAYED RELEASE ORAL at 18:17

## 2020-11-25 RX ADMIN — GLYCOPYRROLATE AND FORMOTEROL FUMARATE 2 PUFF(S): 9; 4.8 AEROSOL, METERED RESPIRATORY (INHALATION) at 05:05

## 2020-11-25 NOTE — PROGRESS NOTE ADULT - SUBJECTIVE AND OBJECTIVE BOX
SUBJECTIVE / OVERNIGHT EVENTS: pt seen and examined    MEDICATIONS  (STANDING):  atorvastatin 10 milliGRAM(s) Oral at bedtime  dextrose 5% + sodium chloride 0.45%. 1000 milliLiter(s) (60 mL/Hr) IV Continuous <Continuous>  glycopyrrolate 9 MICROgram(s)/formoterol 4.8 MICROgram(s) Inhaler 2 Puff(s) Inhalation two times a day  metoprolol succinate ER 25 milliGRAM(s) Oral daily  multivitamin 1 Tablet(s) Oral daily  pantoprazole  Injectable 40 milliGRAM(s) IV Push every 12 hours    MEDICATIONS  (PRN):  acetaminophen   Tablet .. 650 milliGRAM(s) Oral every 6 hours PRN Mild Pain (1 - 3)  ALBUTerol    0.083% 2.5 milliGRAM(s) Nebulizer every 8 hours PRN Shortness of Breath and/or Wheezing  ondansetron Injectable 4 milliGRAM(s) IV Push every 6 hours PRN Nausea and/or Vomiting    Vital Signs Last 24 Hrs  T(C): 36.7 (25 Nov 2020 20:04), Max: 37.1 (25 Nov 2020 00:40)  T(F): 98.1 (25 Nov 2020 20:04), Max: 98.7 (25 Nov 2020 00:40)  HR: 95 (25 Nov 2020 20:04) (83 - 95)  BP: 150/74 (25 Nov 2020 20:04) (121/70 - 153/82)  BP(mean): --  RR: 20 (25 Nov 2020 20:04) (18 - 20)  SpO2: 98% (25 Nov 2020 20:04) (93% - 99%)    Constitutional: No fever, fatigue  Skin: No rash.  Eyes: No recent vision problems or eye pain.  ENT: No congestion, ear pain, or sore throat.  Cardiovascular: No chest pain or palpation.  Respiratory: No cough, shortness of breath, congestion, or wheezing.  Gastrointestinal: No abdominal pain, nausea, vomiting, or diarrhea.  Genitourinary: No dysuria.  Musculoskeletal: No joint swelling.  Neurologic: No headache.    PHYSICAL EXAM:  GENERAL: NAD  EYES: EOMI, PERRLA  NECK: Supple, No JVD  CHEST/LUNG: dec breath sounds at bases   HEART:  S1 , S2 +  ABDOMEN: soft , bs+  EXTREMITIES:  no edema  NEUROLOGY:alert awake    LABS:  11-24    143  |  104  |  14  ----------------------------<  103<H>  3.6   |  30  |  1.49<H>    Ca    8.6      24 Nov 2020 07:10      Creatinine Trend: 1.49 <--, 1.62 <--, 1.65 <--                        9.6    6.32  )-----------( 273      ( 25 Nov 2020 07:19 )             31.0     Urine Studies:                      RADIOLOGY & ADDITIONAL TESTS:    Imaging Personally Reviewed:    Consultant(s) Notes Reviewed:      Care Discussed with Consultants/Other Providers:

## 2020-11-25 NOTE — PROGRESS NOTE ADULT - SUBJECTIVE AND OBJECTIVE BOX
[facility] NEPHROLOGY- PROGRESS NOTE    Patient is a 80y Female with COPD with chronic respiratory failure on home O2, lives at NH, who presented to the hospital with generalized weakness found to have anemia with suspected GIB.  Baseline creatinine is ~1.    Pt feels weak/tired overall.  No other complaints.    PAST MEDICAL & SURGICAL HISTORY:  HTN (hypertension)    A-fib    CVA (cerebral vascular accident)    Former smoker    Gallstones    Hyperlipidemia    Dizziness    Stenosis of carotid artery  may 2017    COPD (chronic obstructive pulmonary disease)    No significant past surgical history    H/O carotid endarterectomy      No Known Allergies    Home Medications Reviewed  Hospital Medications:   MEDICATIONS  (STANDING):  atorvastatin 10 milliGRAM(s) Oral at bedtime  dextrose 5% + sodium chloride 0.45%. 1000 milliLiter(s) (60 mL/Hr) IV Continuous <Continuous>  glycopyrrolate 9 MICROgram(s)/formoterol 4.8 MICROgram(s) Inhaler 2 Puff(s) Inhalation two times a day  metoprolol succinate ER 25 milliGRAM(s) Oral daily  pantoprazole  Injectable 40 milliGRAM(s) IV Push every 12 hours    SOCIAL HISTORY:  Denies ETOh,Smoking,   FAMILY HISTORY:  No pertinent family history in first degree relatives      REVIEW OF SYSTEMS:  CONSTITUTIONAL: +weakness,no  fevers or chills  EYES/ENT: No visual changes;  No vertigo or throat pain   NECK: No pain or stiffness  RESPIRATORY: No cough, wheezing, hemoptysis; No shortness of breath  CARDIOVASCULAR: No chest pain or palpitations.  GASTROINTESTINAL: No abdominal or epigastric pain. No nausea, vomiting, or hematemesis; No diarrhea or constipation. No melena or hematochezia.  GENITOURINARY: No dysuria, frequency, foamy urine, urinary urgency, incontinence or hematuria  NEUROLOGICAL: No numbness or weakness  SKIN: No itching, burning, rashes, or lesions   VASCULAR: No bilateral lower extremity edema.   All other review of systems is negative unless indicated above.    VITALS:  T(F): 98.2 (11-25-20 @ 09:15), Max: 98.7 (11-25-20 @ 00:40)  HR: 86 (11-25-20 @ 09:15)  BP: 145/72 (11-25-20 @ 09:15)  RR: 18 (11-25-20 @ 09:15)  SpO2: 93% (11-25-20 @ 09:15)  Wt(kg): --    11-24 @ 07:01  -  11-25 @ 07:00  --------------------------------------------------------  IN: 2000 mL / OUT: 1050 mL / NET: 950 mL      PHYSICAL EXAM:  Constitutional: NAD  HEENT: anicteric sclera, oropharynx clear, MMM  Neck: No JVD  Respiratory: CTAB, no wheezes, rales or rhonchi  Cardiovascular: S1, S2, RRR  Gastrointestinal: BS+, soft, NT/ND  Extremities: No cyanosis or clubbing. No peripheral edema  Neurological: A/O x 2, no focal deficits,  Psychiatric: calm, confused  : No CVA tenderness. No morales.   Skin: No rashes    LABS:  11-24    143  |  104  |  14  ----------------------------<  103<H>  3.6   |  30  |  1.49<H>    Ca    8.6      24 Nov 2020 07:10      Creatinine Trend: 1.49 <--, 1.62 <--, 1.65 <--                        9.6    6.32  )-----------( 273      ( 25 Nov 2020 07:19 )             31.0

## 2020-11-25 NOTE — PROVIDER CONTACT NOTE (OTHER) - ASSESSMENT
pt confused; denies pain; in no distress  on supplemental o2
Pt with dk green stool x 1, no abd tenderness, voiding well.

## 2020-11-25 NOTE — PROGRESS NOTE ADULT - ASSESSMENT
hgb is stable, leikly secondary to gib, a/c . pt with dementia and comorbidies, recommend conservative mangemtn, keep off a/c

## 2020-11-25 NOTE — PROGRESS NOTE ADULT - ATTENDING COMMENTS
abo sent in sda
[facility] NEPHROLOGY  Luther Frias M.D.  Derrick Dee D.O.  Constanza Richter M.D.  Alize Garcia, MSN, ANP-C    Telephone: [phone]  Facsimile: [phone]    [address] [place] [address]

## 2020-11-25 NOTE — PROGRESS NOTE ADULT - PROBLEM SELECTOR PLAN 1
s/p 1 unit PRBCs    Likely 2/2 GI source given recent admission  As per gI , no further intervention given stable hb   Will keep NPO for now and monitor stability of CBC to decide further care.

## 2020-11-25 NOTE — PROGRESS NOTE ADULT - SUBJECTIVE AND OBJECTIVE BOX
PULMONARY PROGRESS NOTE    SULMA MEI  MRN-[id number]    Patient is a 80y old  Female who presents with a chief complaint of Anemia 2/2 likely GI bleed (25 Nov 2020 08:26)      HPI:  on 2L  comfortable      ROS:   -    ACTIVE MEDICATION LIST:  MEDICATIONS  (STANDING):  atorvastatin 10 milliGRAM(s) Oral at bedtime  dextrose 5% + sodium chloride 0.45%. 1000 milliLiter(s) (60 mL/Hr) IV Continuous <Continuous>  glycopyrrolate 9 MICROgram(s)/formoterol 4.8 MICROgram(s) Inhaler 2 Puff(s) Inhalation two times a day  metoprolol succinate ER 25 milliGRAM(s) Oral daily  pantoprazole  Injectable 40 milliGRAM(s) IV Push every 12 hours    MEDICATIONS  (PRN):  acetaminophen   Tablet .. 650 milliGRAM(s) Oral every 6 hours PRN Mild Pain (1 - 3)  ALBUTerol    0.083% 2.5 milliGRAM(s) Nebulizer every 8 hours PRN Shortness of Breath and/or Wheezing  ondansetron Injectable 4 milliGRAM(s) IV Push every 6 hours PRN Nausea and/or Vomiting      EXAM:  Vital Signs Last 24 Hrs  T(C): 36.3 (25 Nov 2020 04:17), Max: 37.1 (25 Nov 2020 00:40)  T(F): 97.4 (25 Nov 2020 04:17), Max: 98.7 (25 Nov 2020 00:40)  HR: 83 (25 Nov 2020 04:17) (79 - 89)  BP: 153/82 (25 Nov 2020 04:17) (129/57 - 158/72)  BP(mean): --  RR: 18 (25 Nov 2020 04:17) (18 - 18)  SpO2: 96% (25 Nov 2020 04:17) (96% - 99%)    GENERAL: The patient is awake and alert in no apparent distress.     LUNGS: Clear to auscultation without wheezing, rales or rhonchi; respirations unlabored    HEART: Regular rate and rhythm without murmur.                            9.6    6.32  )-----------( 273      ( 25 Nov 2020 07:19 )             31.0       11-24    143  |  104  |  14  ----------------------------<  103<H>  3.6   |  30  |  1.49<H>    Ca    8.6      24 Nov 2020 07:10       a< from: Xray Chest 1 View- PORTABLE-Routine (Xray Chest 1 View- PORTABLE-Routine in AM.) (11.10.20 @ 09:04) >    EXAM:  XR CHEST PORTABLE ROUTINE 1V                            PROCEDURE DATE:  11/10/2020            INTERPRETATION:  A single chest x-ray was obtained on November 10, 2020.    Indication: COPD. Cough.    Impression:    The heart is normal in size. The lungs are hyperaerated. The lungs appear to be clear. No pleural effusion. No pneumothorax. No acute bony pathology could be identified.                KEENA ELLIS MD; Attending Radiologist  This document has been electronically signed. Nov10 2020  9:27AM    < end of copied text >      PROBLEM LIST:  80y Female with HEALTH ISSUES - PROBLEM Dx:  Essential hypertension  Essential hypertension    Chronic obstructive pulmonary disease, unspecified COPD type  Chronic obstructive pulmonary disease, unspecified COPD type    Atrial fibrillation, unspecified type  Atrial fibrillation, unspecified type    Symptomatic anemia  Symptomatic anemia         RECS:  remain on her home 02 requirements  continue Bevespi  nebs prn      Please call with any questions      Cassidy Flores DO  [facility] Pulmonary/Sleep Medicine  [phone]

## 2020-11-25 NOTE — PROGRESS NOTE ADULT - SUBJECTIVE AND OBJECTIVE BOX
SULMA [id number]  80yFemale  T(C): 36.3 (11-25-20 @ 04:17), Max: 37.1 (11-25-20 @ 00:40)  HR: 83 (11-25-20 @ 04:17) (79 - 89)  BP: 153/82 (11-25-20 @ 04:17) (129/57 - 158/72)  RR: 18 (11-25-20 @ 04:17) (18 - 18)  SpO2: 96% (11-25-20 @ 04:17) (96% - 99%)  Wt(kg): --  11-24 @ 07:01  -  11-25 @ 07:00  --------------------------------------------------------  IN: 2000 mL / OUT: 1050 mL / NET: 950 mL      normal cephalic atraumatic  s1s2   clear to ascultation bilaterally  soft, non tender, non distended no guarding or rebound  no clubbing cyanosis or edema

## 2020-11-25 NOTE — CHART NOTE - NSCHARTNOTEFT_GEN_A_CORE
Nutrition Follow Up Note  Patient seen for: initial malnutrition follow up. Chart reviewed and events noted.     Pt is a 79 yo F PMH COPD, chronic hypoxic respiratory failure on 4L NC, HTN, HLD, CVA, AFib on eliquis, recent admission here for anemia now sent from nursing home by family due to recurrent weakness. pt c symptomatic anemia s/p 1 unit PRBCs.     Source:   Medical record and pt    Diet :   Soft:   Ensure Enlive Cans or Servings Per Day:  x3 daily  Promote Cans or Servings Per Day:  x1 daily    Patient reports: that appetite and PO intake remain poor. Pt however is tolerating soft diet texture well. Pt drinking recommended Ensure; RD to change flavor. Not taking Promote. RD reinforced Increased protein-energy needs, pt verbalized understanding. Pt denies recent N/V, constipation or diarrhea. Last BM 11/24.      PO intake :  25% meals  100% Ensure Enlive     Source for PO intake:  Pt    No daily wt to address  Dosing wt 96.7 lbs (43.8 kg)  RD will continue to trend for new wts    Pertinent Medications: MEDICATIONS  (STANDING):  atorvastatin 10 milliGRAM(s) Oral at bedtime  dextrose 5% + sodium chloride 0.45%. 1000 milliLiter(s) (60 mL/Hr) IV Continuous <Continuous>  glycopyrrolate 9 MICROgram(s)/formoterol 4.8 MICROgram(s) Inhaler 2 Puff(s) Inhalation two times a day  metoprolol succinate ER 25 milliGRAM(s) Oral daily  pantoprazole  Injectable 40 milliGRAM(s) IV Push every 12 hours    MEDICATIONS  (PRN):  acetaminophen   Tablet .. 650 milliGRAM(s) Oral every 6 hours PRN Mild Pain (1 - 3)  ALBUTerol    0.083% 2.5 milliGRAM(s) Nebulizer every 8 hours PRN Shortness of Breath and/or Wheezing  ondansetron Injectable 4 milliGRAM(s) IV Push every 6 hours PRN Nausea and/or Vomiting    Pertinent Labs: 11/24 Cr 1.49 <H> Gluc 103 <H>     Skin per nursing documentation: Stage 1 pressure injury to sacrum   Edema per nursing documentation: None noted.     Estimated Needs:   [x] no change since previous assessment  Based on dosing wt 96.7 lbs (43.8 kg);  lbs (54.4 kg) used for protein   Estimated Energy Needs: (30-35 kcals/kg) 6286-8452 kcals  Estimated Protein Needs: (1.0-1.2 gm/kg) 54.4-65.28 gm  Estimated Fluid Needs: (25-30 cc/kg) 7061-8607 cc    Previous Nutrition Diagnosis: Severe malnutrition context of acute illness.   Nutrition Diagnosis is: ongoing and being addressed with encouraged PO intake and recommended nutrition supplements.     New Nutrition Diagnosis: N/A    Recommend  1) Recommend continue current Soft diet without therapeutic restrictions. Consistency y deferred to SLP and provider.   2) Continue to provide Ensure Enlive Cans x3 daily.  3) Recommend discontinue Promote Cans x1 daily per pt request.  4) Reinforce Increased protein-energy needs as needed.   5) Consider multivitamin if not medically contraindicated.     Monitoring and Evaluation:   Continue to monitor Nutritional intake, Tolerance to diet prescription, weights, labs, skin integrity    RD remains available upon request and will follow up per protocol  Radha Ray, MS, RD, CDN Pager #[phone]

## 2020-11-26 LAB
HCT VFR BLD CALC: 28.6 % — LOW (ref 34.5–45)
HGB BLD-MCNC: 8.8 G/DL — LOW (ref 11.5–15.5)
MCHC RBC-ENTMCNC: 26.7 PG — LOW (ref 27–34)
MCHC RBC-ENTMCNC: 30.8 GM/DL — LOW (ref 32–36)
MCV RBC AUTO: 86.7 FL — SIGNIFICANT CHANGE UP (ref 80–100)
NRBC # BLD: 0 /100 WBCS — SIGNIFICANT CHANGE UP (ref 0–0)
PLATELET # BLD AUTO: 257 K/UL — SIGNIFICANT CHANGE UP (ref 150–400)
RBC # BLD: 3.3 M/UL — LOW (ref 3.8–5.2)
RBC # FLD: 17.6 % — HIGH (ref 10.3–14.5)
WBC # BLD: 6.55 K/UL — SIGNIFICANT CHANGE UP (ref 3.8–10.5)
WBC # FLD AUTO: 6.55 K/UL — SIGNIFICANT CHANGE UP (ref 3.8–10.5)

## 2020-11-26 PROCEDURE — 99232 SBSQ HOSP IP/OBS MODERATE 35: CPT

## 2020-11-26 RX ADMIN — GLYCOPYRROLATE AND FORMOTEROL FUMARATE 2 PUFF(S): 9; 4.8 AEROSOL, METERED RESPIRATORY (INHALATION) at 06:25

## 2020-11-26 RX ADMIN — SODIUM CHLORIDE 60 MILLILITER(S): 9 INJECTION, SOLUTION INTRAVENOUS at 06:25

## 2020-11-26 RX ADMIN — PANTOPRAZOLE SODIUM 40 MILLIGRAM(S): 20 TABLET, DELAYED RELEASE ORAL at 06:25

## 2020-11-26 RX ADMIN — ALBUTEROL 2.5 MILLIGRAM(S): 90 AEROSOL, METERED ORAL at 06:24

## 2020-11-26 RX ADMIN — GLYCOPYRROLATE AND FORMOTEROL FUMARATE 2 PUFF(S): 9; 4.8 AEROSOL, METERED RESPIRATORY (INHALATION) at 17:55

## 2020-11-26 RX ADMIN — Medication 25 MILLIGRAM(S): at 06:25

## 2020-11-26 RX ADMIN — SODIUM CHLORIDE 60 MILLILITER(S): 9 INJECTION, SOLUTION INTRAVENOUS at 17:57

## 2020-11-26 RX ADMIN — ALBUTEROL 2.5 MILLIGRAM(S): 90 AEROSOL, METERED ORAL at 15:21

## 2020-11-26 RX ADMIN — ATORVASTATIN CALCIUM 10 MILLIGRAM(S): 80 TABLET, FILM COATED ORAL at 22:27

## 2020-11-26 RX ADMIN — Medication 1 TABLET(S): at 17:55

## 2020-11-26 RX ADMIN — PANTOPRAZOLE SODIUM 40 MILLIGRAM(S): 20 TABLET, DELAYED RELEASE ORAL at 17:55

## 2020-11-26 NOTE — CONSULT NOTE ADULT - REASON FOR ADMISSION
Anemia 2/2 likely GI bleed

## 2020-11-26 NOTE — CONSULT NOTE ADULT - ASSESSMENT
EKG: SR , normal EKG     Echo: < from: TTE Echo Complete w/o Contrast w/ Doppler (08.03.20 @ 08:44) >   1. Left ventricular ejection fraction, by visual estimation, is >75%.   2. Hyperdynamic global left ventricular systolic function.   3. Increased LV wall thickness.   4. Spectral Doppler shows impaired relaxation pattern of left ventricular myocardial filling (Grade I diastolic dysfunction).   5. Mild mitral annular calcification.   6. Thickening and calcification of the anterior mitral valve leaflet.   7. Mild aortic regurgitation.   8. Sclerotic aortic valve with normal opening.    < end of copied text >    Assessment and Plan     1) Weakness: HB acceptable, Euvolemic on exam in SR     2) Afib : off A/c since last admission , pt  to follow with Dr Mantilla     3) DVT PPX heparin

## 2020-11-26 NOTE — PROGRESS NOTE ADULT - SUBJECTIVE AND OBJECTIVE BOX
SUBJECTIVE / OVERNIGHT EVENTS: pt seen and examined    MEDICATIONS  (STANDING):  atorvastatin 10 milliGRAM(s) Oral at bedtime  dextrose 5% + sodium chloride 0.45%. 1000 milliLiter(s) (60 mL/Hr) IV Continuous <Continuous>  glycopyrrolate 9 MICROgram(s)/formoterol 4.8 MICROgram(s) Inhaler 2 Puff(s) Inhalation two times a day  metoprolol succinate ER 25 milliGRAM(s) Oral daily  multivitamin 1 Tablet(s) Oral daily  pantoprazole  Injectable 40 milliGRAM(s) IV Push every 12 hours    MEDICATIONS  (PRN):  acetaminophen   Tablet .. 650 milliGRAM(s) Oral every 6 hours PRN Mild Pain (1 - 3)  ALBUTerol    0.083% 2.5 milliGRAM(s) Nebulizer every 8 hours PRN Shortness of Breath and/or Wheezing  ondansetron Injectable 4 milliGRAM(s) IV Push every 6 hours PRN Nausea and/or Vomiting    Vital Signs Last 24 Hrs  T(C): 36.7 (26 Nov 2020 20:06), Max: 36.8 (26 Nov 2020 08:24)  T(F): 98.1 (26 Nov 2020 20:06), Max: 98.3 (26 Nov 2020 08:24)  HR: 90 (26 Nov 2020 20:06) (90 - 99)  BP: 125/70 (26 Nov 2020 20:06) (124/72 - 145/78)  BP(mean): --  RR: 18 (26 Nov 2020 20:06) (18 - 20)  SpO2: 100% (26 Nov 2020 20:06) (98% - 100%)    Constitutional: No fever, fatigue  Skin: No rash.  Eyes: No recent vision problems or eye pain.  ENT: No congestion, ear pain, or sore throat.  Cardiovascular: No chest pain or palpation.  Respiratory: No cough, shortness of breath, congestion, or wheezing.  Gastrointestinal: No abdominal pain, nausea, vomiting, or diarrhea.  Genitourinary: No dysuria.  Musculoskeletal: No joint swelling.  Neurologic: No headache.    PHYSICAL EXAM:  GENERAL: NAD  EYES: EOMI, PERRLA  NECK: Supple, No JVD  CHEST/LUNG: dec breath sounds at bases   HEART:  S1 , S2 +  ABDOMEN: soft , bs+  EXTREMITIES:  no edema  NEUROLOGY:alert awake    LABS:        Creatinine Trend: 1.49 <--, 1.62 <--, 1.65 <--                        8.8    6.55  )-----------( 257      ( 26 Nov 2020 07:09 )             28.6     Urine Studies:

## 2020-11-26 NOTE — CONSULT NOTE ADULT - SUBJECTIVE AND OBJECTIVE BOX
David Montez MD  Interventional Cardiology / Endovascular Specialist  [place] Office : [phone] [place] [address] [place] [address]  Tel:   [place] Office : [address] [facility] [place] [address]  Tel: [phone]  Cell : [phone]      HISTORY OF PRESENTING ILLNESS:    79 yo F PMH COPD, chronic hypoxic respiratory failure on 4L NC, HTN, HLD, CVA, AFib on eliquis, recent admission here for anemia now sent from nursing home by family due to recurrent weakness. Patient with dementia and is a poor historian, however patient's daughter had sent patient to hospital as she was concerned that they are not appropriately caring for patient's weakness. Patient reports continuing weakness similar to her chronic issues since her CVA. Denies any acute pain in abdomen or chest but as patient is a poor historian due to dementia unclear reliability of other history. On prior admission patient was felt not to be a candidate for invasive GI testing and she was managed conservatively with PPI and transfusion for GI bleed.       PAST MEDICAL & SURGICAL HISTORY:  HTN (hypertension)    A-fib    CVA (cerebral vascular accident)    Former smoker    Gallstones    Hyperlipidemia    Dizziness    Stenosis of carotid artery  may 2017    COPD (chronic obstructive pulmonary disease)    No significant past surgical history    H/O carotid endarterectomy        SOCIAL HISTORY: Substance Use (street drugs): ( x ) never used  (  ) other:    FAMILY HISTORY:  No pertinent family history in first degree relatives        REVIEW OF SYSTEMS:  CONSTITUTIONAL: No fever, weight loss, or fatigue  EYES: No eye pain, visual disturbances, or discharge  ENMT:  No difficulty hearing, tinnitus, vertigo; No sinus or throat pain  BREASTS: No pain, masses, or nipple discharge  GASTROINTESTINAL: No abdominal or epigastric pain. No nausea, vomiting, or hematemesis; No diarrhea or constipation. No melena or hematochezia.  GENITOURINARY: No dysuria, frequency, hematuria, or incontinence  NEUROLOGICAL: No headaches, memory loss, loss of strength, numbness, or tremors  ENDOCRINE: No heat or cold intolerance; No hair loss  MUSCULOSKELETAL: No joint pain or swelling; No muscle, back, or extremity pain  PSYCHIATRIC: No depression, anxiety, mood swings, or difficulty sleeping  HEME/LYMPH: No easy bruising, or bleeding gums  All others negative    MEDICATIONS:  metoprolol succinate ER 25 milliGRAM(s) Oral daily      ALBUTerol    0.083% 2.5 milliGRAM(s) Nebulizer every 8 hours PRN  glycopyrrolate 9 MICROgram(s)/formoterol 4.8 MICROgram(s) Inhaler 2 Puff(s) Inhalation two times a day    acetaminophen   Tablet .. 650 milliGRAM(s) Oral every 6 hours PRN  ondansetron Injectable 4 milliGRAM(s) IV Push every 6 hours PRN    pantoprazole  Injectable 40 milliGRAM(s) IV Push every 12 hours    atorvastatin 10 milliGRAM(s) Oral at bedtime    dextrose 5% + sodium chloride 0.45%. 1000 milliLiter(s) IV Continuous <Continuous>  multivitamin 1 Tablet(s) Oral daily      FAMILY HISTORY:  No pertinent family history in first degree relatives          Allergies    No Known Allergies    Intolerances    	      PHYSICAL EXAM:  T(C): 36.7 (11-26-20 @ 12:13), Max: 36.8 (11-25-20 @ 16:05)  HR: 90 (11-26-20 @ 12:13) (90 - 99)  BP: 124/72 (11-26-20 @ 12:13) (122/75 - 150/74)  RR: 18 (11-26-20 @ 12:13) (18 - 20)  SpO2: 100% (11-26-20 @ 12:13) (98% - 100%)  Wt(kg): --  I&O's Summary    25 Nov 2020 07:01  -  26 Nov 2020 07:00  --------------------------------------------------------  IN: 1380 mL / OUT: 950 mL / NET: 430 mL    26 Nov 2020 07:01  -  26 Nov 2020 14:16  --------------------------------------------------------  IN: 360 mL / OUT: 300 mL / NET: 60 mL        GENERAL: NAD  EYES: conjunctiva and sclera clear  ENMT: No tonsillar erythema, exudates, or enlargementes  Cardiovascular: Normal S1 S2, No JVD, No murmurs, No edema  Respiratory: Lungs clear to auscultation	  Gastrointestinal:  Soft, Non-tender, + BS	  Extremities: No edema      LABS:	 	    CARDIAC MARKERS:                                  8.8    6.55  )-----------( 257      ( 26 Nov 2020 07:09 )             28.6           proBNP:   Lipid Profile:   HgA1c:   TSH:     Consultant(s) Notes Reviewed:  [x ] YES  [ ] NO    Care Discussed with Consultants/Other Providers [ x] YES  [ ] NO    Imaging Personally Reviewed independently:  [x] YES  [ ] NO    All labs, radiologic studies, vitals, orders and medications list reviewed. Patient is seen and examined at bedside. Case discussed with medical team.    ASSESSMENT/PLAN:

## 2020-11-26 NOTE — PROGRESS NOTE ADULT - SUBJECTIVE AND OBJECTIVE BOX
PULMONARY PROGRESS NOTE    SULMA MEI  MRN-[id number]    Patient is a 80y old  Female who presents with a chief complaint of Anemia 2/2 likely GI bleed (26 Nov 2020 10:19)      HPI:  no acute events overnight  denies resp complaints     ROS:   -    ACTIVE MEDICATION LIST:  MEDICATIONS  (STANDING):  atorvastatin 10 milliGRAM(s) Oral at bedtime  dextrose 5% + sodium chloride 0.45%. 1000 milliLiter(s) (60 mL/Hr) IV Continuous <Continuous>  glycopyrrolate 9 MICROgram(s)/formoterol 4.8 MICROgram(s) Inhaler 2 Puff(s) Inhalation two times a day  metoprolol succinate ER 25 milliGRAM(s) Oral daily  multivitamin 1 Tablet(s) Oral daily  pantoprazole  Injectable 40 milliGRAM(s) IV Push every 12 hours    MEDICATIONS  (PRN):  acetaminophen   Tablet .. 650 milliGRAM(s) Oral every 6 hours PRN Mild Pain (1 - 3)  ALBUTerol    0.083% 2.5 milliGRAM(s) Nebulizer every 8 hours PRN Shortness of Breath and/or Wheezing  ondansetron Injectable 4 milliGRAM(s) IV Push every 6 hours PRN Nausea and/or Vomiting      EXAM:  Vital Signs Last 24 Hrs  T(C): 36.7 (26 Nov 2020 12:13), Max: 36.8 (25 Nov 2020 14:01)  T(F): 98.1 (26 Nov 2020 12:13), Max: 98.3 (25 Nov 2020 14:01)  HR: 90 (26 Nov 2020 12:13) (83 - 99)  BP: 124/72 (26 Nov 2020 12:13) (121/70 - 150/74)  BP(mean): --  RR: 18 (26 Nov 2020 12:13) (18 - 20)  SpO2: 100% (26 Nov 2020 12:13) (95% - 100%)    GENERAL: The patient is awake and alert in no apparent distress.     LUNGS: Clear to auscultation without wheezing, rales or rhonchi; respirations unlabored    HEART: Regular rate and rhythm without murmur.                            8.8    6.55  )-----------( 257      ( 26 Nov 2020 07:09 )             28.6            < from: Xray Chest 1 View- PORTABLE-Routine (Xray Chest 1 View- PORTABLE-Routine in AM.) (11.10.20 @ 09:04) >    EXAM:  XR CHEST PORTABLE ROUTINE 1V                            PROCEDURE DATE:  11/10/2020            INTERPRETATION:  A single chest x-ray was obtained on November 10, 2020.    Indication: COPD. Cough.    Impression:    The heart is normal in size. The lungs are hyperaerated. The lungs appear to be clear. No pleural effusion. No pneumothorax. No acute bony pathology could be identified.                KEENA ELLIS MD; Attending Radiologist    < end of copied text >    PROBLEM LIST:  80y Female with HEALTH ISSUES - PROBLEM Dx:  Essential hypertension  Essential hypertension    Chronic obstructive pulmonary disease, unspecified COPD type  Chronic obstructive pulmonary disease, unspecified COPD type    Atrial fibrillation, unspecified type  Atrial fibrillation, unspecified type    Symptomatic anemia  Symptomatic anemia              RECS:  continue Bevespi  nebs prn      Please call with any questions.    Cassidy Flores DO  [facility]  [phone]

## 2020-11-27 ENCOUNTER — TRANSCRIPTION ENCOUNTER (OUTPATIENT)
Age: 80
End: 2020-11-27

## 2020-11-27 LAB
ANION GAP SERPL CALC-SCNC: 11 MMOL/L — SIGNIFICANT CHANGE UP (ref 5–17)
BUN SERPL-MCNC: 14 MG/DL — SIGNIFICANT CHANGE UP (ref 7–23)
CALCIUM SERPL-MCNC: 8.7 MG/DL — SIGNIFICANT CHANGE UP (ref 8.4–10.5)
CHLORIDE SERPL-SCNC: 103 MMOL/L — SIGNIFICANT CHANGE UP (ref 96–108)
CO2 SERPL-SCNC: 30 MMOL/L — SIGNIFICANT CHANGE UP (ref 22–31)
CREAT SERPL-MCNC: 1.47 MG/DL — HIGH (ref 0.5–1.3)
GLUCOSE SERPL-MCNC: 120 MG/DL — HIGH (ref 70–99)
HCT VFR BLD CALC: 31.2 % — LOW (ref 34.5–45)
HGB BLD-MCNC: 9.5 G/DL — LOW (ref 11.5–15.5)
MCHC RBC-ENTMCNC: 27.1 PG — SIGNIFICANT CHANGE UP (ref 27–34)
MCHC RBC-ENTMCNC: 30.4 GM/DL — LOW (ref 32–36)
MCV RBC AUTO: 88.9 FL — SIGNIFICANT CHANGE UP (ref 80–100)
NRBC # BLD: 0 /100 WBCS — SIGNIFICANT CHANGE UP (ref 0–0)
PLATELET # BLD AUTO: 303 K/UL — SIGNIFICANT CHANGE UP (ref 150–400)
POTASSIUM SERPL-MCNC: 3.5 MMOL/L — SIGNIFICANT CHANGE UP (ref 3.5–5.3)
POTASSIUM SERPL-SCNC: 3.5 MMOL/L — SIGNIFICANT CHANGE UP (ref 3.5–5.3)
RBC # BLD: 3.51 M/UL — LOW (ref 3.8–5.2)
RBC # FLD: 17.8 % — HIGH (ref 10.3–14.5)
SODIUM SERPL-SCNC: 144 MMOL/L — SIGNIFICANT CHANGE UP (ref 135–145)
WBC # BLD: 8.62 K/UL — SIGNIFICANT CHANGE UP (ref 3.8–10.5)
WBC # FLD AUTO: 8.62 K/UL — SIGNIFICANT CHANGE UP (ref 3.8–10.5)

## 2020-11-27 PROCEDURE — 99232 SBSQ HOSP IP/OBS MODERATE 35: CPT

## 2020-11-27 RX ADMIN — ATORVASTATIN CALCIUM 10 MILLIGRAM(S): 80 TABLET, FILM COATED ORAL at 21:20

## 2020-11-27 RX ADMIN — PANTOPRAZOLE SODIUM 40 MILLIGRAM(S): 20 TABLET, DELAYED RELEASE ORAL at 16:40

## 2020-11-27 RX ADMIN — ALBUTEROL 2.5 MILLIGRAM(S): 90 AEROSOL, METERED ORAL at 14:45

## 2020-11-27 RX ADMIN — SODIUM CHLORIDE 60 MILLILITER(S): 9 INJECTION, SOLUTION INTRAVENOUS at 05:10

## 2020-11-27 RX ADMIN — ALBUTEROL 2.5 MILLIGRAM(S): 90 AEROSOL, METERED ORAL at 23:24

## 2020-11-27 RX ADMIN — Medication 25 MILLIGRAM(S): at 05:05

## 2020-11-27 RX ADMIN — GLYCOPYRROLATE AND FORMOTEROL FUMARATE 2 PUFF(S): 9; 4.8 AEROSOL, METERED RESPIRATORY (INHALATION) at 16:40

## 2020-11-27 RX ADMIN — Medication 1 TABLET(S): at 11:59

## 2020-11-27 RX ADMIN — SODIUM CHLORIDE 60 MILLILITER(S): 9 INJECTION, SOLUTION INTRAVENOUS at 16:40

## 2020-11-27 RX ADMIN — GLYCOPYRROLATE AND FORMOTEROL FUMARATE 2 PUFF(S): 9; 4.8 AEROSOL, METERED RESPIRATORY (INHALATION) at 05:05

## 2020-11-27 RX ADMIN — ALBUTEROL 2.5 MILLIGRAM(S): 90 AEROSOL, METERED ORAL at 05:05

## 2020-11-27 RX ADMIN — PANTOPRAZOLE SODIUM 40 MILLIGRAM(S): 20 TABLET, DELAYED RELEASE ORAL at 05:05

## 2020-11-27 NOTE — PROGRESS NOTE ADULT - SUBJECTIVE AND OBJECTIVE BOX
SULMA [id number]  80yFemale  T(C): 37 (11-27-20 @ 04:55), Max: 37 (11-27-20 @ 04:55)  HR: 91 (11-27-20 @ 04:55) (90 - 99)  BP: 157/83 (11-27-20 @ 04:55) (124/72 - 157/83)  RR: 18 (11-27-20 @ 04:55) (18 - 18)  SpO2: 94% (11-27-20 @ 04:55) (94% - 100%)  Wt(kg): --  11-26 @ 07:01  -  11-27 @ 07:00  --------------------------------------------------------  IN: 1930 mL / OUT: 800 mL / NET: 1130 mL      normal cephalic atraumatic  s1s2   clear to ascultation bilaterally  soft, non tender, non distended no guarding or rebound  no clubbing cyanosis or edema

## 2020-11-27 NOTE — PROGRESS NOTE ADULT - ASSESSMENT
Assessment and Plan    81 yo F PMH COPD, chronic hypoxic respiratory failure on 4L NC, HTN, HLD, CVA, AFib not on AC,  recent admission here for anemia now sent from nursing home by family due to recurrent weakness.    EKG: SR , normal EKG     1) Weakness:   -Hemoglobin improving  -Euvolemic on exam in SR   -s/p 1U PRBC transfusion   -echo from 08/2020 with normal EF  -GI on board, conservative management     2) Afib   -off A/c since last admission  -pt  to follow with Dr Mantilla     3) DVT Prophylaxis  -heparin subq

## 2020-11-27 NOTE — HOSPICE CARE NOTE - CONVESATION DETAILS
TC of today with the Pt's daughter Hannah Prince. DME requested, and ordered: bed, shira, 3/1 commode, courtney, nebulizer w/mask, O2 @2L/m via NC, prn. Delivery scheduled for today, by 5:00p. Daughter is in agreement.    TC with CAITLYN CANTOR - informed of receipt of completed [facility] Consents, and DME requested/ordered/delivery date.    As per CM - the current plan is for d/c to home tomorrow w/HCN, pending confirmation of DME delivery, and medical clearance. Tentative d/c scheduled for 2p tomorrow, 11/28/2020.

## 2020-11-27 NOTE — DISCHARGE NOTE PROVIDER - CARE PROVIDER_API CALL
Reba Geronimo  INTERNAL MEDICINE  [address] [facility], [address]  [place] [address]  Phone: [phone]  Fax: [phone]  Established Patient  Follow Up Time: 1 week

## 2020-11-27 NOTE — HOSPICE CARE NOTE - DME DETAILS
DME ordered today: bed, shira, 3/1 courtney forbes, nebulizer w/mask, O2 @2L/m via NC, prn. Delivery scheduled for today, by 17:00. Daughter Hannah Prince informed, and she is in agreement.

## 2020-11-27 NOTE — DISCHARGE NOTE PROVIDER - NSDCCPCAREPLAN_GEN_ALL_CORE_FT
PRINCIPAL DISCHARGE DIAGNOSIS  Diagnosis: Anemia  Assessment and Plan of Treatment:        PRINCIPAL DISCHARGE DIAGNOSIS  Diagnosis: Anemia  Assessment and Plan of Treatment: You were transfused 1 unit packed red blood cells.  Conservative management      SECONDARY DISCHARGE DIAGNOSES  Diagnosis: Chronic obstructive pulmonary disease, unspecified COPD type  Assessment and Plan of Treatment: Chronic obstructive pulmonary disease, unspecified Please continue to use your inhalers as prescribed and follow-up with your primary care provider/pulmonologist for further care/recommendations. It is recommended to undergo annual pulmonary function testings. Monitor for signs/symptoms of COPD exacerbation, such as, shortness of breath, increased sputum production, increased cough, wheezing, difficulty breathing, or fever - Report to the emergency room if symptoms are not relieved by usual regimen. type    Diagnosis: Atrial fibrillation, unspecified type  Assessment and Plan of Treatment: Atrial fibrillation, unspecified type  Atrial fibrillation is the most common heart rhythm problem & has the risk of stroke & heart attack  It helps if you control your blood pressure, not drink more than 1-2 alcohol drinks per day, cut down on caffeine, getting treatment for over active thyroid gland, & getting exercise  Call your doctor if you feel your heart racing or beating unusually, chest tightness or pain, lightheaded, faint, shortness of breath especially with exercise  It is important to take your heart medication as prescribed  You may be on anticoagulation which is very important to take as directed - you may need blood work to monitor drug levels      Diagnosis: Essential hypertension  Assessment and Plan of Treatment: Essential hypertension  Low salt diet  Activity as tolerated.  Take all medication as prescribed.  Follow up with your medical doctor for routine blood pressure monitoring at your next visit.  Notify your doctor if you have any of the following symptoms:   Dizziness, Lightheadedness, Blurry vision, Headache, Chest pain, Shortness of breath

## 2020-11-27 NOTE — DISCHARGE NOTE PROVIDER - NSDCMRMEDTOKEN_GEN_ALL_CORE_FT
albuterol 2.5 mg/3 mL (0.083%) inhalation solution: 3 milliliter(s) by nebulizer every 8 hours, As Needed  apixaban 2.5 mg oral tablet: 1 tab(s) orally 2 times a day  atorvastatin 10 mg oral tablet: 1 tab(s) orally once a day  Bevespi Aerosphere 9 mcg-4.8 mcg/inh inhalation aerosol: 2 puff(s) inhaled every 12 hours  Ferrousal 325 mg oral tablet: 1 tab(s) orally once a day  metoprolol succinate 25 mg oral tablet, extended release: 1 tab(s) orally once a day  Multiple Vitamins oral tablet: 1 tab(s) orally once a day  ondansetron 4 mg oral tablet: 1 tab(s) orally every 8 hours, As Needed - for nausea  Protonix 40 mg oral delayed release tablet: 1 tab(s) orally 2 times a day  Senna 8.6 mg oral tablet: 2 tab(s) orally once a day (at bedtime)  Tylenol 325 mg oral tablet: 2 tab(s) orally every 6 hours, As Needed  Venofer: 100 milligram(s) intravenous once a day for 2 days   albuterol 2.5 mg/3 mL (0.083%) inhalation solution: 3 milliliter(s) by nebulizer every 8 hours, As Needed  atorvastatin 10 mg oral tablet: 1 tab(s) orally once a day  Bevespi Aerosphere 9 mcg-4.8 mcg/inh inhalation aerosol: 2 puff(s) inhaled every 12 hours  Ferrousal 325 mg oral tablet: 1 tab(s) orally once a day  metoprolol succinate 25 mg oral tablet, extended release: 1 tab(s) orally once a day  Multiple Vitamins oral tablet: 1 tab(s) orally once a day  ondansetron 4 mg oral tablet: 1 tab(s) orally every 8 hours, As Needed - for nausea  Protonix 40 mg oral delayed release tablet: 1 tab(s) orally 2 times a day  Senna 8.6 mg oral tablet: 2 tab(s) orally once a day (at bedtime)  Tylenol 325 mg oral tablet: 2 tab(s) orally every 6 hours, As Needed   albuterol 2.5 mg/3 mL (0.083%) inhalation solution: 3 milliliter(s) by nebulizer every 8 hours, As Needed  atorvastatin 10 mg oral tablet: 1 tab(s) orally once a day  Bevespi Aerosphere 9 mcg-4.8 mcg/inh inhalation aerosol: 2 puff(s) inhaled 2 times a day   Ferrousal 325 mg oral tablet: 1 tab(s) orally once a day  metoprolol succinate 25 mg oral tablet, extended release: 1 tab(s) orally once a day  Multiple Vitamins oral tablet: 1 tab(s) orally once a day  ondansetron 4 mg oral tablet: 1 tab(s) orally every 8 hours, As Needed - for nausea  Protonix 40 mg oral delayed release tablet: 1 tab(s) orally 2 times a day  Senna 8.6 mg oral tablet: 2 tab(s) orally once a day (at bedtime)  Tylenol 325 mg oral tablet: 2 tab(s) orally every 6 hours, As Needed

## 2020-11-27 NOTE — DISCHARGE NOTE PROVIDER - HOSPITAL COURSE
79 yo F PMH COPD, chronic hypoxic respiratory failure on 4L NC, HTN, HLD, CVA, AFib on eliquis, recent admission here for anemia now sent from nursing home by family due to recurrent weakness.  Patient with Symptomatic anemia, Hgb 7.7 - s/p 1 unit PRBCs. Likely 2/2 GI source given recent admission. GI/Pt/Family - medical management no intervention at this time. Patient with Afib, will continue to hold AC.   Patient seen by pulm for CoPD - continue with 2LNC, Nebs, ihnaler  Plan to discharge home with home Hospice, patient/family agreeable.    DCP and medication reconciliation discussed with Dr Hines and in agreement. Patient is medical cleared for discharge home with home hospice

## 2020-11-27 NOTE — PROGRESS NOTE ADULT - SUBJECTIVE AND OBJECTIVE BOX
David Montez MD  Interventional Cardiology / Endovascular Specialist  [place] Office : [address] [place] [address] [place] [address]  Tel:   [place] Office : [address] [facility] [place] [address]  Tel: [phone]  Cell : [phone]    Subjective/Overnights events: Patient seen sitting up in chair comfortably. Denies chest pain, palpitations or SOB  	  MEDICATIONS:  metoprolol succinate ER 25 milliGRAM(s) Oral daily      ALBUTerol    0.083% 2.5 milliGRAM(s) Nebulizer every 8 hours PRN  glycopyrrolate 9 MICROgram(s)/formoterol 4.8 MICROgram(s) Inhaler 2 Puff(s) Inhalation two times a day    acetaminophen   Tablet .. 650 milliGRAM(s) Oral every 6 hours PRN  ondansetron Injectable 4 milliGRAM(s) IV Push every 6 hours PRN    pantoprazole  Injectable 40 milliGRAM(s) IV Push every 12 hours    atorvastatin 10 milliGRAM(s) Oral at bedtime    dextrose 5% + sodium chloride 0.45%. 1000 milliLiter(s) IV Continuous <Continuous>  multivitamin 1 Tablet(s) Oral daily      PAST MEDICAL/SURGICAL HISTORY  PAST MEDICAL & SURGICAL HISTORY:  HTN (hypertension)    A-fib    CVA (cerebral vascular accident)    Former smoker    Gallstones    Hyperlipidemia    Dizziness    Stenosis of carotid artery  may 2017    COPD (chronic obstructive pulmonary disease)    No significant past surgical history    H/O carotid endarterectomy        SOCIAL HISTORY: Substance Use (street drugs): ( x ) never used  (  ) other:    FAMILY HISTORY:  No pertinent family history in first degree relatives        REVIEW OF SYSTEMS:  CONSTITUTIONAL: No fever, weight loss, or fatigue  EYES: No eye pain, visual disturbances, or discharge  ENMT:  No difficulty hearing, tinnitus, vertigo; No sinus or throat pain  BREASTS: No pain, masses, or nipple discharge  GASTROINTESTINAL: No abdominal or epigastric pain. No nausea, vomiting, or hematemesis; No diarrhea or constipation. No melena or hematochezia.  GENITOURINARY: No dysuria, frequency, hematuria, or incontinence  NEUROLOGICAL: No headaches, memory loss, loss of strength, numbness, or tremors  ENDOCRINE: No heat or cold intolerance; No hair loss  MUSCULOSKELETAL: No joint pain or swelling; No muscle, back, or extremity pain  PSYCHIATRIC: No depression, anxiety, mood swings, or difficulty sleeping  HEME/LYMPH: No easy bruising, or bleeding gums  All others negative    PHYSICAL EXAM:  T(C): 36.4 (11-27-20 @ 12:08), Max: 37 (11-27-20 @ 04:55)  HR: 86 (11-27-20 @ 12:08) (86 - 91)  BP: 125/67 (11-27-20 @ 12:08) (125/67 - 157/83)  RR: 18 (11-27-20 @ 12:08) (18 - 18)  SpO2: 98% (11-27-20 @ 12:08) (94% - 100%)  Wt(kg): --  I&O's Summary    26 Nov 2020 07:01  -  27 Nov 2020 07:00  --------------------------------------------------------  IN: 1930 mL / OUT: 800 mL / NET: 1130 mL    27 Nov 2020 07:01  -  27 Nov 2020 13:31  --------------------------------------------------------  IN: 360 mL / OUT: 0 mL / NET: 360 mL          GENERAL: NAD  EYES: conjunctiva and sclera clear  ENMT: No tonsillar erythema, exudates, or enlargementes  Cardiovascular: Normal S1 S2, No JVD, No murmurs, No edema  Respiratory: Lungs clear to auscultation	  Gastrointestinal:  Soft, Non-tender, + BS	  Extremities: No edema                                  9.5    8.62  )-----------( 303      ( 27 Nov 2020 07:09 )             31.2     11-27    144  |  103  |  14  ----------------------------<  120<H>  3.5   |  30  |  1.47<H>    Ca    8.7      27 Nov 2020 07:09      proBNP:   Lipid Profile:   HgA1c:   TSH:     Consultant(s) Notes Reviewed:  [x ] YES  [ ] NO    Care Discussed with Consultants/Other Providers [ x] YES  [ ] NO    Imaging Personally Reviewed independently:  [x] YES  [ ] NO    All labs, radiologic studies, vitals, orders and medications list reviewed. Patient is seen and examined at bedside. Case discussed with medical team.

## 2020-11-27 NOTE — PROGRESS NOTE ADULT - SUBJECTIVE AND OBJECTIVE BOX
PULMONARY PROGRESS NOTE    SULMA MEI  MRN-[id number]    Patient is a 80y old  Female who presents with a chief complaint of Anemia 2/2 likely GI bleed (27 Nov 2020 08:01)      HPI:  on her 2L  no resp complaints    ROS:   -    ACTIVE MEDICATION LIST:  MEDICATIONS  (STANDING):  atorvastatin 10 milliGRAM(s) Oral at bedtime  dextrose 5% + sodium chloride 0.45%. 1000 milliLiter(s) (60 mL/Hr) IV Continuous <Continuous>  glycopyrrolate 9 MICROgram(s)/formoterol 4.8 MICROgram(s) Inhaler 2 Puff(s) Inhalation two times a day  metoprolol succinate ER 25 milliGRAM(s) Oral daily  multivitamin 1 Tablet(s) Oral daily  pantoprazole  Injectable 40 milliGRAM(s) IV Push every 12 hours    MEDICATIONS  (PRN):  acetaminophen   Tablet .. 650 milliGRAM(s) Oral every 6 hours PRN Mild Pain (1 - 3)  ALBUTerol    0.083% 2.5 milliGRAM(s) Nebulizer every 8 hours PRN Shortness of Breath and/or Wheezing  ondansetron Injectable 4 milliGRAM(s) IV Push every 6 hours PRN Nausea and/or Vomiting      EXAM:  Vital Signs Last 24 Hrs  T(C): 37 (27 Nov 2020 04:55), Max: 37 (27 Nov 2020 04:55)  T(F): 98.6 (27 Nov 2020 04:55), Max: 98.6 (27 Nov 2020 04:55)  HR: 91 (27 Nov 2020 04:55) (90 - 91)  BP: 157/83 (27 Nov 2020 04:55) (124/72 - 157/83)  BP(mean): --  RR: 18 (27 Nov 2020 04:55) (18 - 18)  SpO2: 94% (27 Nov 2020 04:55) (94% - 100%)    GENERAL: The patient is awake and alert in no apparent distress.     LUNGS: Clear to auscultation without wheezing, rales or rhonchi; respirations unlabored    HEART: Regular rate and rhythm without murmur.                            9.5    8.62  )-----------( 303      ( 27 Nov 2020 07:09 )             31.2       11-27    144  |  103  |  14  ----------------------------<  120<H>  3.5   |  30  |  1.47<H>    Ca    8.7      27 Nov 2020 07:09       rad< from: Xray Chest 1 View- PORTABLE-Routine (Xray Chest 1 View- PORTABLE-Routine in AM.) (11.10.20 @ 09:04) >    EXAM:  XR CHEST PORTABLE ROUTINE 1V                            PROCEDURE DATE:  11/10/2020            INTERPRETATION:  A single chest x-ray was obtained on November 10, 2020.    Indication: COPD. Cough.    Impression:    The heart is normal in size. The lungs are hyperaerated. The lungs appear to be clear. No pleural effusion. No pneumothorax. No acute bony pathology could be identified.                KEENA ELLIS MD; Attending Radiologist  This document has been electronically signed. Nov10 2020  9:27AM    < end of copied text >      PROBLEM LIST:  80y Female with HEALTH ISSUES - PROBLEM Dx:  Essential hypertension  Essential hypertension    Chronic obstructive pulmonary disease, unspecified COPD type  Chronic obstructive pulmonary disease, unspecified COPD type    Atrial fibrillation, unspecified type  Atrial fibrillation, unspecified type    Symptomatic anemia  Symptomatic anemia         RECS:  unchanged pulm status  continue inhaler  continue 02      Please call with any questions.    Cassidy Flores DO  [facility] Pulmonary/Sleep Medicine  [phone]

## 2020-11-27 NOTE — DISCHARGE NOTE PROVIDER - NSDCFUADDAPPT_GEN_ALL_CORE_FT
Home hospice [facility] will follow up with you in your home after discharge.  [phone]

## 2020-11-28 ENCOUNTER — TRANSCRIPTION ENCOUNTER (OUTPATIENT)
Age: 80
End: 2020-11-28

## 2020-11-28 VITALS
OXYGEN SATURATION: 100 % | HEART RATE: 90 BPM | TEMPERATURE: 98 F | RESPIRATION RATE: 18 BRPM | DIASTOLIC BLOOD PRESSURE: 72 MMHG | SYSTOLIC BLOOD PRESSURE: 156 MMHG

## 2020-11-28 LAB
ANION GAP SERPL CALC-SCNC: 11 MMOL/L — SIGNIFICANT CHANGE UP (ref 5–17)
BUN SERPL-MCNC: 15 MG/DL — SIGNIFICANT CHANGE UP (ref 7–23)
CALCIUM SERPL-MCNC: 8.8 MG/DL — SIGNIFICANT CHANGE UP (ref 8.4–10.5)
CHLORIDE SERPL-SCNC: 104 MMOL/L — SIGNIFICANT CHANGE UP (ref 96–108)
CO2 SERPL-SCNC: 29 MMOL/L — SIGNIFICANT CHANGE UP (ref 22–31)
CREAT SERPL-MCNC: 1.52 MG/DL — HIGH (ref 0.5–1.3)
GLUCOSE SERPL-MCNC: 108 MG/DL — HIGH (ref 70–99)
HCT VFR BLD CALC: 30.4 % — LOW (ref 34.5–45)
HGB BLD-MCNC: 9.1 G/DL — LOW (ref 11.5–15.5)
MCHC RBC-ENTMCNC: 26.7 PG — LOW (ref 27–34)
MCHC RBC-ENTMCNC: 29.9 GM/DL — LOW (ref 32–36)
MCV RBC AUTO: 89.1 FL — SIGNIFICANT CHANGE UP (ref 80–100)
NRBC # BLD: 0 /100 WBCS — SIGNIFICANT CHANGE UP (ref 0–0)
PLATELET # BLD AUTO: 285 K/UL — SIGNIFICANT CHANGE UP (ref 150–400)
POTASSIUM SERPL-MCNC: 3.8 MMOL/L — SIGNIFICANT CHANGE UP (ref 3.5–5.3)
POTASSIUM SERPL-SCNC: 3.8 MMOL/L — SIGNIFICANT CHANGE UP (ref 3.5–5.3)
RBC # BLD: 3.41 M/UL — LOW (ref 3.8–5.2)
RBC # FLD: 17.9 % — HIGH (ref 10.3–14.5)
SODIUM SERPL-SCNC: 144 MMOL/L — SIGNIFICANT CHANGE UP (ref 135–145)
WBC # BLD: 8.45 K/UL — SIGNIFICANT CHANGE UP (ref 3.8–10.5)
WBC # FLD AUTO: 8.45 K/UL — SIGNIFICANT CHANGE UP (ref 3.8–10.5)

## 2020-11-28 PROCEDURE — 99232 SBSQ HOSP IP/OBS MODERATE 35: CPT

## 2020-11-28 RX ORDER — METOPROLOL TARTRATE 50 MG
1 TABLET ORAL
Qty: 30 | Refills: 0
Start: 2020-11-28 | End: 2020-12-27

## 2020-11-28 RX ORDER — APIXABAN 2.5 MG/1
1 TABLET, FILM COATED ORAL
Qty: 0 | Refills: 0 | DISCHARGE

## 2020-11-28 RX ORDER — GLYCOPYRROLATE AND FORMOTEROL FUMARATE 9; 4.8 UG/1; UG/1
2 AEROSOL, METERED RESPIRATORY (INHALATION)
Qty: 1 | Refills: 0
Start: 2020-11-28 | End: 2020-12-27

## 2020-11-28 RX ORDER — GLYCOPYRROLATE AND FORMOTEROL FUMARATE 9; 4.8 UG/1; UG/1
2 AEROSOL, METERED RESPIRATORY (INHALATION)
Qty: 0 | Refills: 0 | DISCHARGE

## 2020-11-28 RX ADMIN — SODIUM CHLORIDE 60 MILLILITER(S): 9 INJECTION, SOLUTION INTRAVENOUS at 08:35

## 2020-11-28 RX ADMIN — GLYCOPYRROLATE AND FORMOTEROL FUMARATE 2 PUFF(S): 9; 4.8 AEROSOL, METERED RESPIRATORY (INHALATION) at 05:34

## 2020-11-28 RX ADMIN — Medication 100 MILLIGRAM(S): at 06:03

## 2020-11-28 RX ADMIN — ALBUTEROL 2.5 MILLIGRAM(S): 90 AEROSOL, METERED ORAL at 08:33

## 2020-11-28 RX ADMIN — Medication 25 MILLIGRAM(S): at 05:34

## 2020-11-28 RX ADMIN — PANTOPRAZOLE SODIUM 40 MILLIGRAM(S): 20 TABLET, DELAYED RELEASE ORAL at 05:33

## 2020-11-28 RX ADMIN — Medication 1 TABLET(S): at 12:51

## 2020-11-28 NOTE — PROGRESS NOTE ADULT - REASON FOR ADMISSION
Anemia 2/2 likely GI bleed

## 2020-11-28 NOTE — PROGRESS NOTE ADULT - SUBJECTIVE AND OBJECTIVE BOX
SULMA [id number]  80yFemale  T(C): 36.7 (11-28-20 @ 05:32), Max: 37 (11-27-20 @ 19:54)  HR: 90 (11-28-20 @ 05:32) (86 - 94)  BP: 150/80 (11-28-20 @ 05:32) (118/67 - 150/80)  RR: 18 (11-28-20 @ 05:32) (18 - 18)  SpO2: 96% (11-28-20 @ 05:32) (96% - 99%)  Wt(kg): --  11-27 @ 07:01  -  11-28 @ 07:00  --------------------------------------------------------  IN: 2420 mL / OUT: 1650 mL / NET: 770 mL      normal cephalic atraumatic  s1s2   clear to ascultation bilaterally  soft, non tender, non distended no guarding or rebound  no clubbing cyanosis or edema

## 2020-11-28 NOTE — PROGRESS NOTE ADULT - SUBJECTIVE AND OBJECTIVE BOX
Kanwal Montez MD  Interventional Cardiology / Endovascular Specialist  [place] Office : [address] [place] [address] [place] [address]  Tel:   [place] Office : [address] [facility] [place] [address]  Tel: [phone]  Cell : [phone]    HISTORY OF PRESENTING ILLNESS:    Patient seen sitting up in chair comfortably. Denies chest pain, palpitations or SOB  	  MEDICATIONS:  metoprolol succinate ER 25 milliGRAM(s) Oral daily      ALBUTerol    0.083% 2.5 milliGRAM(s) Nebulizer every 8 hours PRN  glycopyrrolate 9 MICROgram(s)/formoterol 4.8 MICROgram(s) Inhaler 2 Puff(s) Inhalation two times a day    acetaminophen   Tablet .. 650 milliGRAM(s) Oral every 6 hours PRN  ondansetron Injectable 4 milliGRAM(s) IV Push every 6 hours PRN    pantoprazole  Injectable 40 milliGRAM(s) IV Push every 12 hours    atorvastatin 10 milliGRAM(s) Oral at bedtime    dextrose 5% + sodium chloride 0.45%. 1000 milliLiter(s) IV Continuous <Continuous>  multivitamin 1 Tablet(s) Oral daily      PAST MEDICAL/SURGICAL HISTORY  PAST MEDICAL & SURGICAL HISTORY:  HTN (hypertension)    A-fib    CVA (cerebral vascular accident)    Former smoker    Gallstones    Hyperlipidemia    Dizziness    Stenosis of carotid artery  may 2017    COPD (chronic obstructive pulmonary disease)    No significant past surgical history    H/O carotid endarterectomy        SOCIAL HISTORY: Substance Use (street drugs): ( x ) never used  (  ) other:    FAMILY HISTORY:  No pertinent family history in first degree relatives        REVIEW OF SYSTEMS:  unable to accurately     PHYSICAL EXAM:  T(C): 36.4 (11-28-20 @ 12:37), Max: 37 (11-27-20 @ 19:54)  HR: 90 (11-28-20 @ 12:37) (90 - 93)  BP: 156/72 (11-28-20 @ 12:37) (118/67 - 156/72)  RR: 18 (11-28-20 @ 12:37) (18 - 18)  SpO2: 100% (11-28-20 @ 12:37) (96% - 100%)  Wt(kg): --  I&O's Summary    27 Nov 2020 07:01  -  28 Nov 2020 07:00  --------------------------------------------------------  IN: 2420 mL / OUT: 1650 mL / NET: 770 mL          GENERAL: NAD  EYES: conjunctiva and sclera clear  ENMT: No tonsillar erythema, exudates, or enlargementes  Cardiovascular: Normal S1 S2, No JVD, No murmurs, No edema  Respiratory: Lungs clear to auscultation	  Gastrointestinal:  Soft, Non-tender, + BS	  Extremities: No edema                                9.1    8.45  )-----------( 285      ( 28 Nov 2020 07:10 )             30.4     11-28    144  |  104  |  15  ----------------------------<  108<H>  3.8   |  29  |  1.52<H>    Ca    8.8      28 Nov 2020 07:10      proBNP:   Lipid Profile:   HgA1c:   TSH:     Consultant(s) Notes Reviewed:  [x ] YES  [ ] NO    Care Discussed with Consultants/Other Providers [ x] YES  [ ] NO    Imaging Personally Reviewed independently:  [x] YES  [ ] NO    All labs, radiologic studies, vitals, orders and medications list reviewed. Patient is seen and examined at bedside. Case discussed with medical team.

## 2020-11-28 NOTE — PROGRESS NOTE ADULT - NUTRITIONAL ASSESSMENT
This patient has been assessed with a concern for Malnutrition and has been determined to have a diagnosis/diagnoses of Severe protein-calorie malnutrition and Underweight/BMI < 19.    This patient is being managed with:   Diet Soft-  Supplement Feeding Modality:  Oral  Ensure Enlive Cans or Servings Per Day:  1       Frequency:  Three Times a day  Entered: Nov 25 2020  6:21PM    
This patient has been assessed with a concern for Malnutrition and has been determined to have a diagnosis/diagnoses of Severe protein-calorie malnutrition and Underweight/BMI < 19.    This patient is being managed with:   Diet Soft-  Supplement Feeding Modality:  Oral  Ensure Enlive Cans or Servings Per Day:  1       Frequency:  Three Times a day  Promote Cans or Servings Per Day:  1       Frequency:  Daily  Entered: Nov 23 2020  7:09PM    
This patient has been assessed with a concern for Malnutrition and has been determined to have a diagnosis/diagnoses of Severe protein-calorie malnutrition and Underweight/BMI < 19.    This patient is being managed with:   Diet Soft-  Supplement Feeding Modality:  Oral  Ensure Enlive Cans or Servings Per Day:  1       Frequency:  Three Times a day  Promote Cans or Servings Per Day:  1       Frequency:  Daily  Entered: Nov 23 2020  7:09PM    
This patient has been assessed with a concern for Malnutrition and has been determined to have a diagnosis/diagnoses of Severe protein-calorie malnutrition and Underweight/BMI < 19.    This patient is being managed with:   Diet Soft-  Supplement Feeding Modality:  Oral  Ensure Enlive Cans or Servings Per Day:  1       Frequency:  Three Times a day  Entered: Nov 25 2020  6:21PM    
This patient has been assessed with a concern for Malnutrition and has been determined to have a diagnosis/diagnoses of Severe protein-calorie malnutrition and Underweight/BMI < 19.    This patient is being managed with:   Diet Soft-  Supplement Feeding Modality:  Oral  Ensure Enlive Cans or Servings Per Day:  1       Frequency:  Three Times a day  Promote Cans or Servings Per Day:  1       Frequency:  Daily  Entered: Nov 23 2020  7:09PM    
This patient has been assessed with a concern for Malnutrition and has been determined to have a diagnosis/diagnoses of Severe protein-calorie malnutrition and Underweight/BMI < 19.    This patient is being managed with:   Diet Soft-  Supplement Feeding Modality:  Oral  Ensure Enlive Cans or Servings Per Day:  1       Frequency:  Three Times a day  Entered: Nov 25 2020  6:21PM    
This patient has been assessed with a concern for Malnutrition and has been determined to have a diagnosis/diagnoses of Severe protein-calorie malnutrition and Underweight/BMI < 19.    This patient is being managed with:   Diet Soft-  Supplement Feeding Modality:  Oral  Ensure Enlive Cans or Servings Per Day:  1       Frequency:  Three Times a day  Promote Cans or Servings Per Day:  1       Frequency:  Daily  Entered: Nov 23 2020  7:09PM    
This patient has been assessed with a concern for Malnutrition and has been determined to have a diagnosis/diagnoses of Severe protein-calorie malnutrition and Underweight/BMI < 19.    This patient is being managed with:   Diet Soft-  Supplement Feeding Modality:  Oral  Ensure Enlive Cans or Servings Per Day:  1       Frequency:  Three Times a day  Promote Cans or Servings Per Day:  1       Frequency:  Daily  Entered: Nov 23 2020  7:09PM

## 2020-11-28 NOTE — PROGRESS NOTE ADULT - ASSESSMENT
81 yo F PMH COPD, chronic hypoxic respiratory failure on 4L NC, HTN, HLD, CVA, AFib not on AC,  recent admission here for anemia now sent from nursing home by family due to recurrent weakness.    EKG: SR , normal EKG     1) Weakness:   -Hemoglobin improving  -Euvolemic on exam in SR   -s/p 1U PRBC transfusion   -echo from 08/2020 with normal EF  -GI on board, conservative management     2) Afib   -off A/c since last admission  -pt  to follow with Dr Mantilla     3) DVT Prophylaxis  -heparin subq

## 2020-11-28 NOTE — DISCHARGE NOTE NURSING/CASE MANAGEMENT/SOCIAL WORK - PATIENT PORTAL LINK FT
You can access the FollowMyHealth Patient Portal offered by [facility] by registering at the following website: [email or web address]. By joining [employer]’s FollowMyHealth portal, you will also be able to view your health information using other applications (apps) compatible with our system.

## 2020-11-28 NOTE — PROGRESS NOTE ADULT - SUBJECTIVE AND OBJECTIVE BOX
PULMONARY PROGRESS NOTE    SULMA MEI  MRN-[id number]    Patient is a 80y old  Female who presents with a chief complaint of Anemia 2/2 likely GI bleed (28 Nov 2020 09:19)      HPI:  on her 2L      ROS:   -    ACTIVE MEDICATION LIST:  MEDICATIONS  (STANDING):  atorvastatin 10 milliGRAM(s) Oral at bedtime  dextrose 5% + sodium chloride 0.45%. 1000 milliLiter(s) (60 mL/Hr) IV Continuous <Continuous>  glycopyrrolate 9 MICROgram(s)/formoterol 4.8 MICROgram(s) Inhaler 2 Puff(s) Inhalation two times a day  metoprolol succinate ER 25 milliGRAM(s) Oral daily  multivitamin 1 Tablet(s) Oral daily  pantoprazole  Injectable 40 milliGRAM(s) IV Push every 12 hours    MEDICATIONS  (PRN):  acetaminophen   Tablet .. 650 milliGRAM(s) Oral every 6 hours PRN Mild Pain (1 - 3)  ALBUTerol    0.083% 2.5 milliGRAM(s) Nebulizer every 8 hours PRN Shortness of Breath and/or Wheezing  ondansetron Injectable 4 milliGRAM(s) IV Push every 6 hours PRN Nausea and/or Vomiting      EXAM:  Vital Signs Last 24 Hrs  T(C): 36.7 (28 Nov 2020 05:32), Max: 37 (27 Nov 2020 19:54)  T(F): 98 (28 Nov 2020 05:32), Max: 98.6 (27 Nov 2020 19:54)  HR: 90 (28 Nov 2020 05:32) (86 - 94)  BP: 150/80 (28 Nov 2020 05:32) (118/67 - 150/80)  BP(mean): --  RR: 18 (28 Nov 2020 05:32) (18 - 18)  SpO2: 96% (28 Nov 2020 05:32) (96% - 99%)    GENERAL: The patient is awake and alert in no apparent distress.     LUNGS:  respirations unlabored    HEART: Regular rate                           9.1    8.45  )-----------( 285      ( 28 Nov 2020 07:10 )             30.4       11-28    144  |  104  |  15  ----------------------------<  108<H>  3.8   |  29  |  1.52<H>    Ca    8.8      28 Nov 2020 07:10       < from: Xray Chest 1 View- PORTABLE-Routine (Xray Chest 1 View- PORTABLE-Routine in AM.) (11.10.20 @ 09:04) >    EXAM:  XR CHEST PORTABLE ROUTINE 1V                            PROCEDURE DATE:  11/10/2020            INTERPRETATION:  A single chest x-ray was obtained on November 10, 2020.    Indication: COPD. Cough.    Impression:    The heart is normal in size. The lungs are hyperaerated. The lungs appear to be clear. No pleural effusion. No pneumothorax. No acute bony pathology could be identified.                KEENA ELLIS MD; Attending Radiologist  This document has been electronically signed. Nov10 2020  9:27AM    < end of copied text >    PROBLEM LIST:  80y Female with HEALTH ISSUES - PROBLEM Dx:  Essential hypertension  Essential hypertension    Chronic obstructive pulmonary disease, unspecified COPD type  Chronic obstructive pulmonary disease, unspecified COPD type    Atrial fibrillation, unspecified type  Atrial fibrillation, unspecified type    Symptomatic anemia  Symptomatic anemia         RECS:  remains stable pulm status        Please call with any questions.    Cassidy Flores DO  [facility] Pulmonary/Sleep Medicine  [phone]

## 2020-12-28 PROCEDURE — 83735 ASSAY OF MAGNESIUM: CPT

## 2020-12-28 PROCEDURE — 84132 ASSAY OF SERUM POTASSIUM: CPT

## 2020-12-28 PROCEDURE — 80048 BASIC METABOLIC PNL TOTAL CA: CPT

## 2020-12-28 PROCEDURE — 82435 ASSAY OF BLOOD CHLORIDE: CPT

## 2020-12-28 PROCEDURE — 36430 TRANSFUSION BLD/BLD COMPNT: CPT

## 2020-12-28 PROCEDURE — U0003: CPT

## 2020-12-28 PROCEDURE — 85610 PROTHROMBIN TIME: CPT

## 2020-12-28 PROCEDURE — 82803 BLOOD GASES ANY COMBINATION: CPT

## 2020-12-28 PROCEDURE — 97530 THERAPEUTIC ACTIVITIES: CPT

## 2020-12-28 PROCEDURE — 86900 BLOOD TYPING SEROLOGIC ABO: CPT

## 2020-12-28 PROCEDURE — 86870 RBC ANTIBODY IDENTIFICATION: CPT

## 2020-12-28 PROCEDURE — 85014 HEMATOCRIT: CPT

## 2020-12-28 PROCEDURE — 82570 ASSAY OF URINE CREATININE: CPT

## 2020-12-28 PROCEDURE — 85730 THROMBOPLASTIN TIME PARTIAL: CPT

## 2020-12-28 PROCEDURE — 99285 EMERGENCY DEPT VISIT HI MDM: CPT | Mod: 25

## 2020-12-28 PROCEDURE — 85018 HEMOGLOBIN: CPT

## 2020-12-28 PROCEDURE — 36415 COLL VENOUS BLD VENIPUNCTURE: CPT

## 2020-12-28 PROCEDURE — 86922 COMPATIBILITY TEST ANTIGLOB: CPT

## 2020-12-28 PROCEDURE — 82330 ASSAY OF CALCIUM: CPT

## 2020-12-28 PROCEDURE — 86902 BLOOD TYPE ANTIGEN DONOR EA: CPT

## 2020-12-28 PROCEDURE — 86850 RBC ANTIBODY SCREEN: CPT

## 2020-12-28 PROCEDURE — 80053 COMPREHEN METABOLIC PANEL: CPT

## 2020-12-28 PROCEDURE — P9016: CPT

## 2020-12-28 PROCEDURE — 82272 OCCULT BLD FECES 1-3 TESTS: CPT

## 2020-12-28 PROCEDURE — 82947 ASSAY GLUCOSE BLOOD QUANT: CPT

## 2020-12-28 PROCEDURE — 71045 X-RAY EXAM CHEST 1 VIEW: CPT

## 2020-12-28 PROCEDURE — 97161 PT EVAL LOW COMPLEX 20 MIN: CPT

## 2020-12-28 PROCEDURE — 97116 GAIT TRAINING THERAPY: CPT

## 2020-12-28 PROCEDURE — 94640 AIRWAY INHALATION TREATMENT: CPT

## 2020-12-28 PROCEDURE — 93005 ELECTROCARDIOGRAM TRACING: CPT

## 2020-12-28 PROCEDURE — P9040: CPT

## 2020-12-28 PROCEDURE — 76770 US EXAM ABDO BACK WALL COMP: CPT

## 2020-12-28 PROCEDURE — 86880 COOMBS TEST DIRECT: CPT

## 2020-12-28 PROCEDURE — 83605 ASSAY OF LACTIC ACID: CPT

## 2020-12-28 PROCEDURE — 87086 URINE CULTURE/COLONY COUNT: CPT

## 2020-12-28 PROCEDURE — 86769 SARS-COV-2 COVID-19 ANTIBODY: CPT

## 2020-12-28 PROCEDURE — 85027 COMPLETE CBC AUTOMATED: CPT

## 2020-12-28 PROCEDURE — 81001 URINALYSIS AUTO W/SCOPE: CPT

## 2020-12-28 PROCEDURE — 85025 COMPLETE CBC W/AUTO DIFF WBC: CPT

## 2020-12-28 PROCEDURE — 86901 BLOOD TYPING SEROLOGIC RH(D): CPT

## 2020-12-28 PROCEDURE — 84300 ASSAY OF URINE SODIUM: CPT

## 2020-12-28 PROCEDURE — 84100 ASSAY OF PHOSPHORUS: CPT

## 2020-12-28 PROCEDURE — 86860 RBC ANTIBODY ELUTION: CPT

## 2020-12-28 PROCEDURE — 74176 CT ABD & PELVIS W/O CONTRAST: CPT

## 2020-12-28 PROCEDURE — 84295 ASSAY OF SERUM SODIUM: CPT

## 2021-04-01 ENCOUNTER — APPOINTMENT (OUTPATIENT)
Dept: PULMONOLOGY | Facility: CLINIC | Age: 81
End: 2021-04-01

## 2021-04-17 NOTE — DIETITIAN INITIAL EVALUATION ADULT. - NUTRITIONGOAL OUTCOME2
[facility]    Medicine Progress Note - Hospitalist Service       Date of Admission:  4/15/2021  Assessment & Plan         Ghislaine Walls is a 82 year old female admitted on 4/15/2021.       Goals of care  - Palliative Care assistance appreciated, see note 4/16; will outpatient follow up in [facility]  - she remains undecided as to whether she wishes to continue with diuretics upon discharge from the hospital; feeling the burden of getting up to the bathroom is quite substantial    Acute hypoxemic respiratory failure  Acute on chronic CHF with preserved EF  R/O CAP  History of stopping her torsemide 1 week ago, now with generalized weakness and fall, found to have O2 saturations of 79%, without home O2 use.  CXR read as nonspecific but could include infection, aspiration or edema  Had mild fever of 100.4; procal suggesting moderate risk of infection and endorsing productive cough so empirically placed on tx for CAP    - continue bumex 1 mg IV bid; 2L out but no intake charted yesterday  - continue ceftriaxone and doxycycline for possible CAP  - wean oxygen as able  - Cards recs appreciated     Hyperkalemia  - K pending today, diuresis as above  - continue the telemetry    CKD IV  - BMP pending 4/17     Afib with bradycardia  HTN  Heart rates from the high 30's to 50s in the ED, with maintained blood pressure.  Rates normalized with holding clonidine and diltiazem but now hypertensive.   - increase hydralazine to 75 mg qid  - continue metoprolol  - continue eliquis    History of gout  - continue allopurinol     Chronic normocytic anemia  MGUS  Thyroid lesion  TSH wnl  - consider outpatient thyroid ultrasound on discharge     RA  - continue PTA prednisone  - recently started orencia as outpatient    History of COVID 19 infection 8/2020  Negative on admission  - no need for special precautions         Diet: Combination Diet Low Saturated Fat Na <2400mg Diet, No Caffeine  Diet  Snacks/Supplements Adult: Ensure Enlive; Between Meals    DVT Prophylaxis: Eliquis  Dejesus Catheter: not present  Code Status: No CPR- Do NOT Intubate           Disposition Plan   Expected discharge: 2 - 3 days, recommended to prior living arrangement once adequately diuresed, goals of care determined.  Entered: Billy Mai MD 04/17/2021, 2:32 PM       The patient's care was discussed with the Bedside Nurse and Patient.      Billy Mai MD  Hospitalist Service  [facility]  Contact information available via [facility] Paging/Directory    ______________________________________________________________________    Interval History   Reports feeling unchanged with persistent fatigue.  Poor sleep overnight.  Denies any cough.  Dyspnea unchanged.  No chest pain/pressure.  Is not sure how she feels about continuing diuretics even with a bedside commode.     Data reviewed today: I reviewed all medications, new labs and imaging results over the last 24 hours. I personally reviewed no images or EKG's today.    Physical Exam   Vital Signs: Temp: 98.7  F (37.1  C) Temp src: Oral BP: (!) 165/59 Pulse: 87   Resp: 18 SpO2: 97 % O2 Device: Nasal cannula Oxygen Delivery: 3 LPM  Weight: 126 lbs 8 oz     General Appearance: Well nourished elderly female in NAD, appearing fatigued  Respiratory: bilateral crackles improved today, no wheezing or tachypnea  Cardiovascular: RRR, normal s1/s2 without murmur  GI: abdomen soft, normal bowel sounds, nontender  Skin: no peripheral edema  Other: Alert and appropriate, cranial nerves grossly intact     Data   Recent Labs   Lab 04/16/21  0550 04/15/21  2238 04/15/21  1139   WBC 3.8*  --  8.3   HGB 8.9*  --  9.9*   *  --  106*   *  --  199    139 140   POTASSIUM 5.6* 5.9* 5.5*   CHLORIDE 112* 113* 110*   CO2 24 22 23   BUN 55* 51* 45*   CR 2.08* 2.09* 1.47*   ANIONGAP 3 4 7   ALIVIA 8.8 8.7 9.2   GLC 85 92 106*   TROPI  --   --  0.018      Pt to meet > 80% of estimated nutrition needs for anabolism and repair.

## 2021-05-06 NOTE — PROGRESS NOTE ADULT - PROBLEM SELECTOR PROBLEM 3
General Sunscreen Counseling: I recommended a broad spectrum sunscreen with a SPF of 30 or higher and/or sun protective clothing to minimize sun exposure. Detail Level: Detailed Chronic obstructive pulmonary disease, unspecified COPD type

## 2021-07-20 NOTE — ED ADULT NURSE NOTE - NS_SISCREENINGSR_GEN_ALL_ED
[No Acute Distress] : no acute distress [Well Nourished] : well nourished [Well Developed] : well developed [Well-Appearing] : well-appearing [Normal Sclera/Conjunctiva] : normal sclera/conjunctiva [PERRL] : pupils equal round and reactive to light [EOMI] : extraocular movements intact [Normal Outer Ear/Nose] : the outer ears and nose were normal in appearance [Normal Oropharynx] : the oropharynx was normal [No JVD] : no jugular venous distention [No Lymphadenopathy] : no lymphadenopathy [Supple] : supple [Thyroid Normal, No Nodules] : the thyroid was normal and there were no nodules present [No Respiratory Distress] : no respiratory distress  [No Accessory Muscle Use] : no accessory muscle use [Clear to Auscultation] : lungs were clear to auscultation bilaterally [Normal Rate] : normal rate  [Regular Rhythm] : with a regular rhythm [Normal S1, S2] : normal S1 and S2 [No Murmur] : no murmur heard [No Carotid Bruits] : no carotid bruits [No Abdominal Bruit] : a ~M bruit was not heard ~T in the abdomen [No Varicosities] : no varicosities [Pedal Pulses Present] : the pedal pulses are present [No Edema] : there was no peripheral edema [No Palpable Aorta] : no palpable aorta [No Extremity Clubbing/Cyanosis] : no extremity clubbing/cyanosis [Soft] : abdomen soft [Non Tender] : non-tender [Non-distended] : non-distended [No Masses] : no abdominal mass palpated [No HSM] : no HSM [Normal Bowel Sounds] : normal bowel sounds [Normal Posterior Cervical Nodes] : no posterior cervical lymphadenopathy [Normal Anterior Cervical Nodes] : no anterior cervical lymphadenopathy [No CVA Tenderness] : no CVA  tenderness [No Spinal Tenderness] : no spinal tenderness [No Joint Swelling] : no joint swelling [Grossly Normal Strength/Tone] : grossly normal strength/tone [No Rash] : no rash [Coordination Grossly Intact] : coordination grossly intact [No Focal Deficits] : no focal deficits [Normal Gait] : normal gait [Deep Tendon Reflexes (DTR)] : deep tendon reflexes were 2+ and symmetric [Normal Affect] : the affect was normal [Normal Insight/Judgement] : insight and judgment were intact [de-identified] : non palpable dark bruises not violacious Negative

## 2021-07-24 NOTE — PROGRESS NOTE ADULT - SUBJECTIVE AND OBJECTIVE BOX
[facility]   MD RENÉ GARDINER MD RUORU WONG, PA    TEL:  OFFICE: [phone]  DR ZAAPTA CELL: [phone]  TAINA EDMONDS CELL: [phone]  DR. CRUZ CELL: [phone]  DR. FINLEY CELL: [phone]    FROM 5 PM - 7 AM PLEASE CALL ANSWERING SERVICE: [phone]    RENAL FOLLOW UP NOTE  --------------------------------------------------------------------------------  HPI:      Pt seen and examined at bedside.   Denies SOB, chest pain     PAST HISTORY  --------------------------------------------------------------------------------  No significant changes to PMH, PSH, FHx, SHx, unless otherwise noted    ALLERGIES & MEDICATIONS  --------------------------------------------------------------------------------  Allergies    No Known Allergies    Intolerances      Standing Inpatient Medications  albuterol/ipratropium for Nebulization 3 milliLiter(s) Nebulizer every 6 hours  amLODIPine   Tablet 2.5 milliGRAM(s) Oral daily  apixaban 2.5 milliGRAM(s) Oral every 12 hours  aspirin enteric coated 81 milliGRAM(s) Oral daily  atorvastatin 10 milliGRAM(s) Oral at bedtime  cefTRIAXone   IVPB 1000 milliGRAM(s) IV Intermittent every 24 hours  ferrous    sulfate 325 milliGRAM(s) Oral daily  tiotropium 18 MICROgram(s) Capsule 1 Capsule(s) Inhalation daily    PRN Inpatient Medications  acetaminophen   Tablet .. 650 milliGRAM(s) Oral every 6 hours PRN  ondansetron    Tablet 4 milliGRAM(s) Oral every 8 hours PRN      REVIEW OF SYSTEMS  --------------------------------------------------------------------------------  General: no fever  CVS: no chest pain  MSK: no edema     VITALS/PHYSICAL EXAM  --------------------------------------------------------------------------------  T(C): 36.6 (05-18-20 @ 04:52), Max: 37.1 (05-17-20 @ 20:38)  HR: 109 (05-18-20 @ 04:52) (96 - 109)  BP: 140/58 (05-18-20 @ 04:52) (105/63 - 140/58)  RR: 18 (05-18-20 @ 04:52) (18 - 19)  SpO2: 100% (05-18-20 @ 04:52) (91% - 100%)  Wt(kg): --        05-17-20 @ 07:01  -  05-18-20 @ 07:00  --------------------------------------------------------  IN: 780 mL / OUT: 801 mL / NET: -21 mL    05-18-20 @ 07:01  -  05-18-20 @ 12:59  --------------------------------------------------------  IN: 240 mL / OUT: 0 mL / NET: 240 mL      Physical Exam:  	Gen: NAD  	HEENT: MMM  	Pulm: CTA B/L  	CV: S1S2  	Abd: Soft, +BS  	Ext: No LE edema B/L                      Neuro: Awake   	Skin: Warm and Dry   	 no morales    LABS/STUDIES  --------------------------------------------------------------------------------              8.9    7.46  >-----------<  405      [05-17-20 @ 05:51]              30.9     139  |  100  |  19  ----------------------------<  92      [05-17-20 @ 05:51]  4.2   |  26  |  1.23        Ca     8.9     [05-17-20 @ 05:51]      Mg     2.0     [05-16-20 @ 14:48]      Phos  4.2     [05-16-20 @ 14:48]                [05-16-20 @ 14:48]    Creatinine Trend:  SCr 1.23 [05-17 @ 05:51]  SCr 1.25 [05-16 @ 14:48]  SCr 1.33 [05-16 @ 00:27]    Urinalysis - [05-16-20 @ 14:55]      Color Light Yellow / Appearance Clear / SG >1.050 / pH 6.0      Gluc Negative / Ketone Negative  / Bili Negative / Urobili Negative       Blood Trace / Protein Trace / Leuk Est Large / Nitrite Positive      RBC 5 /  / Hyaline 2 / Gran  / Sq Epi  / Non Sq Epi 1 / Bacteria Many      Iron 30, TIBC 385, %sat 8      [05-16-20 @ 17:17]  HbA1c 5.7      [06-03-18 @ 08:41] No

## 2022-03-22 NOTE — ED ADULT TRIAGE NOTE - PATIENT ON (OXYGEN DELIVERY METHOD)
Order for pain maagement  Kay Torres MD   
Subjective     Subjective   Mildly cough. Patient reports less dyspnea.  Review of Systems   Pneumonia diseases inpatient pulmonary embolism next no fevers, chills or sweats.  Objective     Objective   Visit Vitals  /75 (BP Location: RUE - Right upper extremity, Patient Position: Supine)   Pulse 71   Temp 97.9 °F (36.6 °C) (Oral)   Resp 16   Ht 5' 6\" (1.676 m)   Wt 56 kg (123 lb 7.3 oz)   SpO2 93%   BMI 19.93 kg/m²     Patient was on room air during encounter  Physical Exam  Constitutional: He is oriented to person, place, and time. No distress  HENT: Normocephalic and atraumatic.   Mouth/Throat: No oropharyngeal exudate.   Eyes: Conjunctivae are normal. No scleral icterus.   Neck: Neck supple. No JVD present.   Cardiovascular: Normal rate and regular rhythm.   Pulmonary/Chest: He has rales (Faint crackles at bases.).   Abdominal: He exhibits no mass. There is no abdominal tenderness.   Musculoskeletal:  General: No edema.   Neurological: He is alert and oriented to person, place, and time.   Skin: Skin is warm and dry.   Psychiatric: He has a normal mood and affect.   I/O's       Intake/Output Summary (Last 24 hours) at 5/3/2020 0920  Last data filed at 5/3/2020 0806  Gross per 24 hour   Intake 422 ml   Output --   Net 422 ml     Labs     Recent Labs   Lab 05/02/20  0620 05/01/20  1325   WBC 15.0* 12.3*   RBC 4.41* 4.36*   HGB 13.8 13.8   HCT 42.1 40.4    332       Recent Labs   Lab 05/02/20  0620 05/01/20  1325  04/30/20  0449 04/29/20  1921   SODIUM 136 133*   < > 130* 130*   POTASSIUM 4.1 4.3   < > 3.5 3.4   CHLORIDE 102 98   < > 97* 96*   CO2 32 30   < > 26 28   BUN 21* 18   < > 13 11   CREATININE 0.89 0.84   < > 0.83 0.82   CALCIUM 9.6 9.0   < > 8.5 8.8   ALBUMIN  --   --   --  2.8* 3.3*   BILIRUBIN  --   --   --  0.8 0.7   ALKPT  --   --   --  98 103   GPT  --   --   --  43 51   AST  --   --   --  43* 47*   GLUCOSE 123* 176*   < > 110* 139*    < > = values in this interval not displayed.       No 
results for input(s): INR in the last 72 hours.    Invalid input(s):  APTT      Imaging     XR CHEST PA OR AP 1 VIEW   Final Result   1.    Bilateral pulmonary infiltrates shows interval progression area      Electronically Signed by: GERTRUDE TREVIÑO M.D.    Signed on: 5/2/2020 3:36 PM          CTA CHEST PULMONARY EMBOLISM W CONTRAST   Final Result   1.  No CT evidence of pulmonary embolus.   2.  Extensive diffuse patchy/ground glass infiltrates noted which could correspond to the known Covid 19.      Electronically Signed by: HIRAM RABAGO M.D.    Signed on: 4/29/2020 9:38 PM          XR CHEST PA OR AP 1 VIEW   Final Result   1. No acute cardiopulmonary findings on this initial AP portable upright chest radiograph.      Electronically Signed by: HIRAM RABAGO M.D.    Signed on: 4/29/2020 7:39 PM            Diagnosis   COVID-19  Associated pneumonia and hypoxemia.  O2 requirements have been increasing since admission.  Procalcitonin slightly elevated.  He is being treated with hydroxychloroquine, ceftriaxone and doxycycline per infectious diseases.   Plan   Antibiotics as ordered.  O2 saturation goal will be around 90%.  Short course of systemic steroids In progress  If blood pressure and lab values permit, will provide Lasix 20 mg in a.m.  DVT prophylaxis.  Inpatient Medications     Current Facility-Administered Medications   Medication   • guaiFENesin-DM (MUCINEX DM) 600-30 mg ER tablet 2 tablet   • PARoxetine (PAXIL) tablet 20 mg   • sodium chloride (PF) 0.9 % injection 2 mL   • sodium chloride 0.9 % flush bag 25 mL   • enoxaparin (LOVENOX) injection 40 mg   • acetaminophen (TYLENOL) tablet 650 mg   • amLODIPine (NORVASC) tablet 5 mg   • pantoprazole (PROTONIX) EC tablet 40 mg   • hydroxychloroquine (PLAQUENIL) tablet 200 mg   • cefTRIAXone (ROCEPHIN) syringe 2,000 mg   • doxycycline hyclate (VIBRAMYCIN) capsule 100 mg         
room air

## 2022-07-04 NOTE — ED PROVIDER NOTE - MDM ORDERS SUBMITTED SELECTION
Imaging Studies/Labs patient remained stable in ED, improved well, results of the diagnostic studies reviewed and discussed with patient, Patient remained awake, alert, ambulatory and comfortable, tolerated PO. Discussed with patient in detail about the need for close outpatient follow up and the need to return to ED for any persistent, or worsening symptoms, for any new symptoms/concerns. patient verbalized understanding and agreed. patient is given detail aftercare instructions and is instructed well to f/u as outpatient for further care.

## 2022-07-25 NOTE — H&P ADULT - BIRTH SEX
Culture showed secondary infection.  Will restart rifampin     Chanell Santiago MD  Board Certified Dermatologist    
Female

## 2022-08-04 NOTE — ED ADULT TRIAGE NOTE - BANDS:
Subjective:      Patient ID: Flo Lewis is a 50 y.o. female. Leg Pain   The incident occurred 2 days ago. There was no injury mechanism. The pain is present in the right leg. The quality of the pain is described as aching and cramping. Pertinent negatives include no inability to bear weight or muscle weakness. She reports no foreign bodies present. The symptoms are aggravated by weight bearing. She has tried nothing for the symptoms. Patient denies any recent illness. She denies recent travel. Patient is not on HRT. She denies family history of clots. Review of Systems   Constitutional: Negative. HENT: Negative. Respiratory: Negative. Cardiovascular: Negative. Gastrointestinal: Negative. Past Medical History:   Diagnosis Date    Hypertension      Past Surgical History:   Procedure Laterality Date    CHOLECYSTECTOMY      WISDOM TOOTH EXTRACTION       Social History     Tobacco Use    Smoking status: Every Day     Packs/day: 1.00     Years: 20.00     Pack years: 20.00     Types: Cigarettes     Start date: 2002    Smokeless tobacco: Never   Vaping Use    Vaping Use: Never used   Substance Use Topics    Alcohol use: Yes     Comment: 2-3 drinks/week    Drug use: Never       Objective:   Physical Exam  HENT:      Head: Normocephalic. Cardiovascular:      Rate and Rhythm: Normal rate. Pulmonary:      Effort: Pulmonary effort is normal.      Breath sounds: Normal breath sounds. Musculoskeletal:      Right lower leg: No edema. Comments: + Khushboo on right   Skin:     General: Skin is warm and dry. Neurological:      General: No focal deficit present. Mental Status: She is alert.    Psychiatric:         Mood and Affect: Mood normal.     VL DUP LOWER EXTREMITY VENOUS RIGHT    Result Date: 8/4/2022  EXAMINATION: DUPLEX VENOUS ULTRASOUND OF THE RIGHT LOWER EXTREMITY 8/4/2022 8:55 am TECHNIQUE: Duplex ultrasound using B-mode/gray scaled imaging and Doppler spectral analysis and color flow was obtained of the deep venous structures of the right extremity. COMPARISON: None. HISTORY: ORDERING SYSTEM PROVIDED HISTORY: Right leg pain FINDINGS: Gastrocnemius veins within the right calf demonstrate thrombus which extends into the popliteal vein. Popliteal vein is partially compressible with some color flow. Sonographic appearance favors acute thrombus. No evidence of DVT above the popliteal vein. The remainder of the visualized veins of the right lower extremity are patent and free of echogenic thrombus. The veins demonstrate good compressibility with normal color flow study and spectral analysis. Thrombus within the gastrocnemius veins in the calf extending into the popliteal vein. Popliteal vein is partially compressible with some color flow. Findings were discussed with Nidia Spann at 9:35 am on 8/4/2022. Assessment:      1. Acute deep vein thrombosis (DVT) of right popliteal vein (HCC)    2. Right leg pain          Plan:      Doppler reviewed with patient. Check Factor V Leiden, Protein C, and Protein S levels. Eliquis 10 mg bid x 7 days then 5 mg bid. Follow-up with PCP.         MYA Lynne Fall Risk;

## 2022-10-06 NOTE — ED ADULT TRIAGE NOTE - ADDITIONAL SAFETY/BANDS...
Sally Flores  : 1945  Encounter date: 10/6/2022    This mathew 76 y.o. female who presents with  Chief Complaint   Patient presents with    Medication Check       History of present illness:    HPI Pt is 76year old female for medication check on ultram for DDD and mulitple OA sites, also for xanax for anxiety, well controlled. Pt with chronic asthma, currently wheezing, uses nebulizer and albutrol HFA as needed. Pt has follow up appt with Dr. Eulogio Farias and will repeat HgbA1c. Pt reports worsened L knee OA, poor ROM, painful with rain.     Current Outpatient Medications on File Prior to Visit   Medication Sig Dispense Refill    amLODIPine (NORVASC) 10 MG tablet TAKE 1 TABLET BY MOUTH EVERY DAY 90 tablet 1    metoprolol succinate (TOPROL XL) 50 MG extended release tablet TAKE 1 TABLET BY MOUTH EVERY DAY 90 tablet 1    pioglitazone (ACTOS) 15 MG tablet TAKE 1 TABLET BY MOUTH EVERY DAY 90 tablet 0    meloxicam (MOBIC) 7.5 MG tablet Take 1 tablet by mouth every day 90 tablet 1    insulin glargine (BASAGLAR KWIKPEN) 100 UNIT/ML injection pen Inject 35 units under skin every evening 15 pen 3    tiZANidine (ZANAFLEX) 2 MG tablet Take 1 tablet by mouth every 8 hours as needed (muscle spasms) 30 tablet 2    escitalopram (LEXAPRO) 10 MG tablet TAKE 1 TABLET BY MOUTH EVERY DAY 90 tablet 1    Insulin Pen Needle ([employer] PEN NEEDLES 31G) 31G X 8 MM MISC 1 each by Does not apply route daily 100 each 3    hydroCHLOROthiazide (HYDRODIURIL) 25 MG tablet TAKE 1 TABLET BY MOUTH EVERY DAY 90 tablet 3    traZODone (DESYREL) 100 MG tablet TAKE 1 TABLET BY MOUTH EVERYDAY AT BEDTIME 90 tablet 3    vitamin D3 (CHOLECALCIFEROL) 25 MCG (1000 UT) TABS tablet Take 1,000 Units by mouth daily      fenofibrate (TRICOR) 145 MG tablet Take 1 tablet by mouth daily 90 tablet 1    budesonide-formoterol (SYMBICORT) 160-4.5 MCG/ACT AERO Inhale 2 puffs into the lungs 2 times daily 1 Inhaler 5    albuterol (PROVENTIL) (2.5 MG/3ML) 0.083% nebulizer solution Take 3 mLs by nebulization every 6 hours as needed for Wheezing 120 each 3    albuterol sulfate  (90 Base) MCG/ACT inhaler INHALE TWO PUFFS BY MOUTH EVERY 6 HOURS AS NEEDED FOR WHEEZING 1 Inhaler 3    dapagliflozin (FARXIGA) 5 MG tablet Take 1 tablet by mouth every morning 90 tablet 1    magnesium gluconate (MAGONATE) 500 MG tablet Take 1 tablet by mouth 2 times daily 30 tablet 5    Multiple Vitamins-Minerals (THERAPEUTIC MULTIVITAMIN-MINERALS) tablet Take 1 tablet by mouth daily      acetaminophen (TYLENOL) 650 MG extended release tablet Take 650 mg by mouth every 8 hours as needed for Pain      sodium bicarbonate 650 MG tablet Take 650 mg by mouth 4 times daily Indications: dr. Eulogio Farias      Respiratory Therapy Supplies (NEBULIZER/TUBING/MOUTHPIECE) KIT 1 kit by Does not apply route daily as needed (SOB) 1 kit 0     No current facility-administered medications on file prior to visit.       Allergies   Allergen Reactions    Amoxicillin-Pot Clavulanate      Causes a yeast infection    Effexor [Venlafaxine]      dizziness    Sertraline     Tramadol      vomiting     Past Medical History:   Diagnosis Date    Allergic rhinitis, cause unspecified     Anxiety state, unspecified     Asthma     Degeneration of lumbar or lumbosacral intervertebral disc     Depressive disorder, not elsewhere classified     Environmental allergies     Esophageal reflux     Irritable bowel syndrome     PONV (postoperative nausea and vomiting)     Pure hyperglyceridemia     Type II or unspecified type diabetes mellitus without mention of complication, uncontrolled     Unspecified asthma(493.90)     Unspecified essential hypertension       Past Surgical History:   Procedure Laterality Date    APPENDECTOMY      BREAST SURGERY Right     CARPAL TUNNEL RELEASE Left 09/12/2017    CHOLECYSTECTOMY      LUMBAR DISC SURGERY      OTHER SURGICAL HISTORY Right 07/17/2018    RIGHT CARPAL TUNNEL RELEASE               TONSILLECTOMY      UPPER GASTROINTESTINAL ENDOSCOPY N/A 4/5/2019    EGD DILATION BALLOON performed by Barbara Ham MD at [facility] N/A 4/5/2019    EGD BIOPSY performed by Barbara Ham MD at [address]      Family History   Problem Relation Age of Onset    Emphysema Mother     Other Mother         Colon resection    Emphysema Father       Social History     Tobacco Use    Smoking status: Never    Smokeless tobacco: Never    Tobacco comments:     2nd hand smoke exposure    Substance Use Topics    Alcohol use: No     Alcohol/week: 0.0 standard drinks        Review of Systems    Objective:    /84 (Site: Left Upper Arm, Position: Sitting, Cuff Size: Medium Adult)   Pulse 82   Temp 97.4 °F (36.3 °C) (Infrared)   Wt 148 lb (67.1 kg)   SpO2 95%   BMI 37.04 kg/m²   Weight: 148 lb (67.1 kg)     BP Readings from Last 3 Encounters:   10/06/22 132/84   06/08/22 130/70   11/12/21 136/72     Wt Readings from Last 3 Encounters:   10/06/22 148 lb (67.1 kg)   06/08/22 143 lb (64.9 kg)   11/12/21 142 lb (64.4 kg)     BMI Readings from Last 3 Encounters:   10/06/22 37.04 kg/m²   06/08/22 35.79 kg/m²   11/12/21 35.54 kg/m²       Physical Exam  Vitals reviewed. Constitutional:       Appearance: She is well-developed. Cardiovascular:      Rate and Rhythm: Normal rate and regular rhythm. Pulses: Normal pulses. Heart sounds: Normal heart sounds. No murmur heard. Pulmonary:      Effort: Pulmonary effort is normal.      Breath sounds: Wheezing present. Musculoskeletal:         General: Tenderness (L knee, poor ROM, achiness) present. No swelling. Skin:     General: Skin is warm and dry. Neurological:      Mental Status: She is alert and oriented to person, place, and time. Psychiatric:         Mood and Affect: Mood normal.       Assessment/Plan    1.  Osteoarthritis of multiple joints, unspecified osteoarthritis type  Controlled  [place] reviewed  Advised to notify Dr. Chelsea Licea, nephrology of meloxicam use d/t contraindicated  - diclofenac sodium (VOLTAREN) 1 % GEL; Apply 4 g topically 4 times daily  Dispense: 150 g; Refill: 0  - traMADol (ULTRAM) 50 MG tablet; Take 1 tablet by mouth 2 times daily as needed for Pain for up to 30 days. Dispense: 60 tablet; Refill: 0    2. Mild intermittent asthma without complication  Advised inhalers/nebulizer as needed  - predniSONE (DELTASONE) 20 MG tablet; Take 1 tablet by mouth 2 times daily for 5 days  Dispense: 10 tablet; Refill: 0    3. Need for vaccination  Aministered  - Influenza, FLUAD, (age 72 y+), IM, Preservative Free, 0.5 mL    4. Anxiety  Controlled  [place]ARRS reviewed  - ALPRAZolam (XANAX) 0.5 MG tablet; TAKE 1 TABLET BY MOUTH 2 TIMES DAILY AS NEEDED FOR SLEEP FOR UP TO 30 DAYS. Dispense: 60 tablet; Refill: 0      Return in about 3 months (around 1/6/2023) for medication re-check, annual check up. This dictation was generated by voice recognition computer software. Although all attempts are made to edit the dictation for accuracy, there may be errors in the transcription that are not intended. Additional Safety/Bands:

## 2022-10-25 NOTE — DIETITIAN INITIAL EVALUATION ADULT. - PROBLEM SELECTOR PLAN 1
+occult blood, anemia hgb ~5 from baseline 8-9. ddx: gastric ulcer vs gi malignancy given nausea/wt loss, vs colon malignancy   - 2 units prbc  - goal hgb >7, c/w large bore iv x 2  - ppi iv q12h  - GI following, f/u recs  - npo for now, if repeat CBC after 2 u prbc stable, consider clears in prep for poss EGD monday?  - hold eliquis and asa, will need to consider resuming eliquis given CVA hx pending stablization of CBC and GOC discussion  - check posttxn cbc tonight  - F/U iron studies, B12, Folate
Ibrahima 6MON RN

## 2022-10-28 NOTE — ED PROVIDER NOTE - ATTENDING CONTRIBUTION TO CARE
Add hctz 12.5 mg daily for bp --- Goal is <130/80  Send me readings of your bp via [employer] in 2 wks    Add biotin for  your nails - Purchase at any drug store or [facility]    Try miralax for constipation - titrate dose as needed    New for sleep - Quviviq - 25 mg. Will be mailed to you. Try it. You can go up to 50 mg but that is the highest dose. Do not take with trazodone.   First script is free - others should be 25$. I have reviewed this note, the presenting symptoms, and the Chief Complaint and the History of Present Illness as documented, with the other care provider(s) and nurses on the patient care team. I have also reviewed this patient's past medical/surgical history and social/family history as reviewed and listed in this electronic medical record.  See MDM.  --BMM

## 2022-11-21 NOTE — PROGRESS NOTE ADULT - PROBLEM SELECTOR PROBLEM 2
Jacki Lancaster needs to be seen for an appointment before further refills are given.   Atrial fibrillation, unspecified type

## 2023-01-22 NOTE — ED ADULT NURSE NOTE - FINAL NURSING ELECTRONIC SIGNATURE
Tonie Pimentel Do  [address]       04/19/19        Patient: Genaro Stevenson   YOB: 2012   Date of Visit: 4/19/2019       Dear  Dr. Emory Jacobs DO,      Thank you for referring Genaro Stevenson to my pra minutes on the discharge service. 23-Jul-2017 02:54

## 2023-06-25 NOTE — ED ADULT NURSE NOTE - NSFALLRSKINDICTYPE_ED_ALL_ED
06/25/23 0036   Provider Notification   Provider Name/Title Dr. Ferreira   Method of Notification Phone;Electronic Page   Request Evaluate-Remote   Notification Reason Lab Results;Status Update     Spoke with Dr. Ferreira regarding lab results, see Potassium and Calcium results, no new orders at this time. Informed that pt reports headache 8/10, PRN ibuprofen given. Pt currently resting with cool cloth on forehead. Per Dr. Ferreira see if rest improves headache. Order obtained to check vital signs Q2H while magnesium infusing, notify provider if BP >160/110. Will place patient care order.   Disorientation
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
## 2024-01-26 NOTE — ED PROVIDER NOTE - SEVERITY
MODERATE This patient presents with symptoms suspicious for likely viral upper respiratory infection. Other less likely Differential includes bacterial pneumonia, sinusitis, allergic rhinitis, Do not suspect underlying cardiopulmonary process. I considered, but think unlikely, dangerous causes of this patient’s symptoms to include ACS, CHF or COPD exacerbations, pneumonia, pneumothorax. Patient is nontoxic appearing and not in need of emergent medical intervention.  Plan:  chest x-ray, viral swab, symptomatic treatment, reassessment

## 2024-02-04 NOTE — H&P ADULT - NSHPPHYSICALEXAM_GEN_ALL_CORE
Vital Signs Last 24 Hrs  T(C): 37.1 (07 Nov 2020 10:36), Max: 37.1 (07 Nov 2020 10:36)  T(F): 98.7 (07 Nov 2020 10:36), Max: 98.7 (07 Nov 2020 10:36)  HR: 87 (07 Nov 2020 13:00) (87 - 96)  BP: 136/70 (07 Nov 2020 13:00) (117/79 - 136/70)  BP(mean): --  RR: 18 (07 Nov 2020 13:00) (18 - 18)  SpO2: 99% (07 Nov 2020 13:00) (99% - 100%)    PHYSICAL EXAM:  GENERAL:  Well appearing, elderly f, poorly groomed, blue eyeshadow, 4L NC, breathing comfortably in NAD  HEAD:  NCAT  EYES: PERRLA, conjunctiva clear  NECK: Supple, No JVD  CHEST/LUNG: CTA B/L. No w/r/r.  HEART: Reg rate, irreg rhythm. Normal S1, S2. No m/r/g.   ABDOMEN: SNTND.   EXTREMITIES:  2+ Peripheral Pulses, No clubbing, cyanosis, edema.  PSYCH:  appropriate affect  SKIN: No rashes or lesions
No

## 2024-02-06 NOTE — PROGRESS NOTE ADULT - ASSESSMENT
Subjective     Patient ID: Oneyda Shah is a 79 y.o. female.    Chief Complaint: Mass (Palms of both hands)    79 yr old pleasant female with DM II, HTN, HLD, and other co morbidities presents today for evaluation of cysts in her both hands. It is not painful and she is here for check up. Started few weeks ago and she noticed it. No change since she saw it. No cysts elsewhere. She would like to avoid surgery or specialists for now. Details as follows -        Mass  This is a chronic problem. The current episode started more than 1 month ago. The problem occurs constantly. The problem has been unchanged. Pertinent negatives include no arthralgias, chest pain, congestion, diaphoresis, headaches, myalgias, nausea or sore throat. Nothing aggravates the symptoms. She has tried nothing for the symptoms. The treatment provided no relief.     Review of Systems   Constitutional: Negative.  Negative for activity change, diaphoresis and unexpected weight change.   HENT: Negative.  Negative for nasal congestion, ear discharge, hearing loss, rhinorrhea, sore throat and voice change.    Eyes: Negative.  Negative for pain, discharge and visual disturbance.   Respiratory: Negative.  Negative for chest tightness, shortness of breath and wheezing.    Cardiovascular: Negative.  Negative for chest pain.   Gastrointestinal: Negative.  Negative for abdominal distention, anal bleeding, constipation and nausea.   Endocrine: Negative.  Negative for cold intolerance, polydipsia and polyuria.   Genitourinary: Negative.  Negative for decreased urine volume, difficulty urinating, dysuria, frequency, menstrual problem and vaginal pain.   Musculoskeletal: Negative.  Negative for arthralgias, gait problem and myalgias.   Integumentary:  Positive for mole/lesion. Negative for color change, pallor and wound. Negative.   Allergic/Immunologic: Negative.  Negative for environmental allergies and immunocompromised state.   Neurological: Negative.   Negative for dizziness, tremors, seizures, speech difficulty and headaches.   Hematological: Negative.  Negative for adenopathy. Does not bruise/bleed easily.   Psychiatric/Behavioral: Negative.  Negative for agitation, confusion, decreased concentration, hallucinations, self-injury and suicidal ideas. The patient is not nervous/anxious.      Past Medical History:   Diagnosis Date    Anemia, unspecified     Aortic atherosclerosis     Bilateral renal cysts     CKD (chronic kidney disease), stage III     Diabetes mellitus type II     Fatty liver     High cholesterol     HLD (hyperlipidemia)     HTN (hypertension)     Hypothyroidism     Hypothyroidism     Osteopenia     Proteinuria     Statin myopathy        Past Surgical History:   Procedure Laterality Date    BREAST BIOPSY      BREAST SURGERY      biopsy    HYSTERECTOMY      still w/ovaries    ROTATOR CUFF REPAIR Left        Family History   Problem Relation Age of Onset    Diabetes Mother     Hypertension Mother     Blindness Father     Coronary artery disease Sister 55        MI    Liver cancer Sister     Diabetes Brother     No Known Problems Brother     Diabetes Daughter     No Known Problems Son     Amblyopia Neg Hx     Cataracts Neg Hx     Glaucoma Neg Hx     Macular degeneration Neg Hx     Retinal detachment Neg Hx     Strabismus Neg Hx        Social History     Socioeconomic History    Marital status:    Tobacco Use    Smoking status: Never    Smokeless tobacco: Never   Substance and Sexual Activity    Alcohol use: No    Drug use: No    Sexual activity: Yes     Partners: Male     Social Determinants of Health     Financial Resource Strain: Low Risk  (1/13/2022)    Overall Financial Resource Strain (CARDIA)     Difficulty of Paying Living Expenses: Not very hard   Food Insecurity: No Food Insecurity (1/13/2022)    Hunger Vital Sign     Worried About Running Out of Food in the Last Year: Never true     Ran Out of Food in the Last Year: Never true    Housing Stability: Low Risk  (1/13/2022)    Housing Stability Vital Sign     Unable to Pay for Housing in the Last Year: No     Number of Places Lived in the Last Year: 1     Unstable Housing in the Last Year: No       Current Outpatient Medications   Medication Sig Dispense Refill    ACCU-CHEK GERTRUDE PLUS TEST STRP Strp TEST ONE TIME DAILY 100 strip 3    amLODIPine (NORVASC) 10 MG tablet Take 1 tablet (10 mg total) by mouth once daily. 30 tablet 11    aspirin (ECOTRIN) 81 MG EC tablet Take 81 mg by mouth once daily.      b complex vitamins tablet Take 1 tablet by mouth once daily.      blood-glucose meter Misc Check daily sugars, dispense insurance covered blood sugar meter 1 each 0    cholecalciferol, vitamin D3, (VITAMIN D3) 25 mcg (1,000 unit) capsule Take 1,000 Units by mouth once daily.      DROPSAFE ALCOHOL PREP PADS PadM USE FOR SKIN CLEANING PRIOR TO FINGERSTICK AS NEEDED AS DIRECTED 3 each 3    empagliflozin (JARDIANCE) 25 mg tablet Take 1 tablet (25 mg total) by mouth once daily. 90 tablet 3    fluticasone propionate (FLONASE) 50 mcg/actuation nasal spray 2 sprays (100 mcg total) by Each Nostril route once daily. 16 mL 11    hyoscyamine (LEVSIN/SL) 0.125 mg Subl Place 1 tablet (0.125 mg total) under the tongue 3 (three) times daily as needed (abdominal cramping). 30 tablet 0    lancets (ACCU-CHEK SOFTCLIX LANCETS) Misc CHECK SUGAR TWO TIMES DAILY 200 each 3    levothyroxine (SYNTHROID) 50 MCG tablet TAKE 1 TABLET EVERY DAY 90 tablet 1    losartan (COZAAR) 100 MG tablet Take 1 tablet (100 mg total) by mouth once daily. 90 tablet 3    metFORMIN (GLUCOPHAGE) 1000 MG tablet TAKE 1 TABLET TWICE DAILY 180 tablet 0    omega-3 fatty acids/fish oil (FISH OIL-OMEGA-3 FATTY ACIDS) 300-1,000 mg capsule Take 1 capsule by mouth once daily.       Current Facility-Administered Medications   Medication Dose Route Frequency Provider Last Rate Last Admin    acetaminophen tablet 650 mg  650 mg Oral Once PRN Darrell Long  79 yo F PMH COPD, chronic hypoxic respiratory failure on 4L NC, HTN, HLD, CVA, AFib on eliquis presenting with months of progressive fatigue and sent in from PCP office for anemia admitted with acute symptomatic blood loss anemia 2/2 suspected GIB. MD CASPER           Review of patient's allergies indicates:   Allergen Reactions    Penicillins Other (See Comments)    Ace inhibitors      cough    Statins-hmg-coa reductase inhibitors      Myalgias,            Objective   Vitals:    02/06/24 1458   BP: 134/66   Pulse: 82       Physical Exam  Constitutional:       General: She is not in acute distress.     Appearance: She is well-developed. She is not diaphoretic.   HENT:      Head: Normocephalic and atraumatic.      Right Ear: External ear normal.      Left Ear: External ear normal.      Nose: Nose normal.      Mouth/Throat:      Pharynx: No oropharyngeal exudate.   Eyes:      General: No scleral icterus.        Right eye: No discharge.         Left eye: No discharge.      Conjunctiva/sclera: Conjunctivae normal.      Pupils: Pupils are equal, round, and reactive to light.   Neck:      Thyroid: No thyromegaly.      Vascular: No JVD.      Trachea: No tracheal deviation.   Cardiovascular:      Rate and Rhythm: Normal rate and regular rhythm.      Heart sounds: Normal heart sounds. No murmur heard.     No friction rub. No gallop.   Pulmonary:      Effort: Pulmonary effort is normal.      Breath sounds: Normal breath sounds. No stridor. No wheezing or rales.   Chest:      Chest wall: No tenderness.   Abdominal:      General: Bowel sounds are normal. There is no distension.      Palpations: Abdomen is soft. There is no mass.      Tenderness: There is no abdominal tenderness. There is no guarding or rebound.      Hernia: No hernia is present.   Musculoskeletal:         General: No tenderness. Normal range of motion.      Cervical back: Normal range of motion and neck supple.      Comments: B/l ganglion cysts B/L hands, nontender - sub centimeter and multiple   Lymphadenopathy:      Cervical: No cervical adenopathy.   Skin:     General: Skin is warm and dry.      Coloration: Skin is not pale.      Findings: No erythema or rash.   Neurological:      Mental Status: She is  alert and oriented to person, place, and time.      Cranial Nerves: No cranial nerve deficit.      Motor: No abnormal muscle tone.      Coordination: Coordination normal.      Deep Tendon Reflexes: Reflexes are normal and symmetric. Reflexes normal.   Psychiatric:         Behavior: Behavior normal.         Thought Content: Thought content normal.         Judgment: Judgment normal.            Assessment and Plan     1. Cyst of joint of hand, unspecified laterality        Cysts of hands B/L  -mucinous or ganglion cysts  -reassurance  -ice application  -ortho precautions given  -handout given    Spent adequate time in obtaining history and explaining differentials    30 minutes spent during this visit of which greater than 50% devoted to face-face counseling and coordination of care regarding diagnosis and management plan           Follow up in about 7 weeks (around 3/25/2024), or if symptoms worsen or fail to improve.

## 2024-03-04 NOTE — ED ADULT NURSE NOTE - NS ED PATIENT SAFETY CONCERN
Velma Beckham was seen and treated in our emergency department on 3/4/2024.  She may return to work on 03/05/2024.  Velma Beckham is cleared to return to work on March 5, 2024.     If you have any questions or concerns, please don't hesitate to call.      Mercedes Morfin MD No

## 2024-04-18 NOTE — ED PROVIDER NOTE - DISPOSITION TYPE
CHIEF COMPLAINT: The patient presents for Office Visit and Eye Problem Follow-up     HISTORY OF PRESENT ILLNESS: Patient presents for a PVD / visual disturbance follow-up. Patient still has windshield wiper floater left eye, still constantly there. No flashing lights. Feels like vision is stable. Patient blood sugars have been good. Increasing Monjaro dosage and decreasing insulin.     Last Lab A1C:  Hemoglobin A1C (%)   Date Value   03/07/2024 5.7 (H)     Last Point of Care A1C:  Hemoglobin A1C, POC (%)   Date Value   03/17/2023 5.7 (A)     Family Hx: none    Patient Hx: Drusen OU, DM type 2  Cataract OU    OCULAR MEDICATION: PV areds 2 1po Ou, Ats ou bid     Tech notes reviewed.    TESTS:  OCT retinal (macula) analysis 12/11/2023:   Reason for test: Macular Drusen  RIGHT: Baseline scan. Scattered soft drusen/ mottling. Central retinal thickness at fovea is 263 microns. Signal strength: 9/10   LEFT: Scattered soft drusen/ mottling. Central retinal thickness at fovea is 257 microns. Signal strength: 10/10     ASSESSMENT/PLAN:   PVD (posterior vitreous detachment)  - discussion on the natural history of posterior vitreous detachment, expectations, concerns, and risks.   - signs and symptoms of a retinal detachment including a change in floaters, photopsia and peripheral vision loss were explained.  - patient to call/return to the clinic immediately as needed if change in vision or if such symptoms occur   - continue to monitor    2. 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   - signs and symptoms of a retinal detachment including a change in floaters, photopsia and peripheral vision loss were explained.  - continue  to monitor at 1 month    Patient's assessment and plan discussed in detail with patient.  All questions answered.    RETURN TO CLINIC:  Return for 9-12month annual AMD and OCT Macula. Return immediately if pain, redness, or decreased vision occur.               DISCHARGE

## 2024-05-22 NOTE — ED PROVIDER NOTE - EKG #1 DATE/TIME
Detail Level: Detailed Depth Of Biopsy: dermis Was A Bandage Applied: Yes Size Of Lesion In Cm: 0 Biopsy Type: H and E Biopsy Method: Dermablade Anesthesia Type: 1% lidocaine with epinephrine Anesthesia Volume In Cc: 0.5 Hemostasis: Drysol Wound Care: Petrolatum Dressing: bandage Destruction After The Procedure: No 07-Nov-2020 11:20 Type Of Destruction Used: Curettage Curettage Text: The wound bed was treated with curettage after the biopsy was performed. Cryotherapy Text: The wound bed was treated with cryotherapy after the biopsy was performed. Electrodesiccation Text: The wound bed was treated with electrodesiccation after the biopsy was performed. Electrodesiccation And Curettage Text: The wound bed was treated with electrodesiccation and curettage after the biopsy was performed. Silver Nitrate Text: The wound bed was treated with silver nitrate after the biopsy was performed. Lab: [id number] Lab Facility: [id number] Consent: Written consent was obtained and risks were reviewed including but not limited to scarring, infection, bleeding, scabbing, incomplete removal, nerve damage and allergy to anesthesia. Post-Care Instructions: I reviewed with the patient in detail post-care instructions. Patient is to keep the biopsy site dry overnight, and then apply bacitracin twice daily until healed. Patient may apply hydrogen peroxide soaks to remove any crusting. Notification Instructions: Patient will be notified of biopsy results. However, patient instructed to call the office if not contacted within 2 weeks. Billing Type: Third-Party Bill Information: Selecting Yes will display possible errors in your note based on the variables you have selected. This validation is only offered as a suggestion for you. PLEASE NOTE THAT THE VALIDATION TEXT WILL BE REMOVED WHEN YOU FINALIZE YOUR NOTE. IF YOU WANT TO FAX A PRELIMINARY NOTE YOU WILL NEED TO TOGGLE THIS TO 'NO' IF YOU DO NOT WANT IT IN YOUR FAXED NOTE.

## 2024-08-26 NOTE — DIETITIAN INITIAL EVALUATION ADULT. - ORAL NUTRITION SUPPLEMENTS
Anesthesia Evaluation     Patient summary reviewed and Nursing notes reviewed   no history of anesthetic complications:   NPO Solid Status: > 8 hours  NPO Liquid Status: > 2 hours           Airway   Mallampati: II  TM distance: >3 FB  Neck ROM: full  Dental      Pulmonary    Cardiovascular     ECG reviewed    (+) hypertension, hyperlipidemia      Neuro/Psych  GI/Hepatic/Renal/Endo      Musculoskeletal     Abdominal    Substance History      OB/GYN          Other                          Anesthesia Plan    ASA 2     general     intravenous induction     Anesthetic plan, risks, benefits, and alternatives have been provided, discussed and informed consent has been obtained with: patient.        CODE STATUS:          Suggest Ensure Enlive as able

## 2025-05-16 NOTE — PROGRESS NOTE ADULT - PROBLEM SELECTOR PLAN 2
Quality 431: Preventive Care And Screening: Unhealthy Alcohol Use - Screening: Patient not identified as an unhealthy alcohol user when screened for unhealthy alcohol use using a systematic screening method Detail Level: Detailed Quality 130: Documentation Of Current Medications In The Medical Record: Current Medications Documented Quality 226: Preventive Care And Screening: Tobacco Use: Screening And Cessation Intervention: Patient screened for tobacco use and is an ex/non-smoker likely pre-renal 2/2 blood loss  NÉSTOR on CKD3, bl Cr 1.2  - transfusions as above  - monitor cr, renally dose meds

## 2025-06-10 NOTE — PATIENT PROFILE ADULT - NSPROPTRIGHTCAREGIVER_GEN_A_NUR
[facility] AMBULATORY ENCOUNTER  HEMATOLOGY/ONCOLOGY OFFICE VISIT    CHIEF COMPLAINT:   Breast Cancer (Malignant neoplasm of upper-outer quadrant of right breast in female, estrogen receptor positive )      Oncologic History:  June 24, 2020 screening mammography identified abnormality in the upper outer aspect of the right breast leading to additional workup  July 8, 2020 right breast core biopsy with small atypical glandular proliferation.  Differential diagnosis includes focal atypical ductal hyperplasia or usual ductal hyperplasia involving columnar cell changes.  October 7, 2020 right breast excisional biopsy with 2 small foci of low-grade DCIS measuring 2.9 mm and 1 mm respectively.  Estrogen receptor positive, progesterone receptor negative  December 16, 2020 underwent right breast partial mastectomy with benign breast tissue showing prior procedure related changes with no residual in situ carcinoma and no invasive carcinoma identified  January 5, 2021 was initiated on anastrozole  Had discussion with radiation oncology but radiation felt to have minimal benefit and was omitted    HISTORY OF PRESENT ILLNESS:  Shonda Fuentes is a 64 year old female who presents for evaluation of breast cancer. This patient is transferring her care to me. I have reviewed her chart. For additional history, please refer to EMR and prior documentation.       She has identified an area of tenderness over her medial left breast for the past month. There is no discharge or masses appreciated. She is doing well with her exams regularly and reports no issues with taking her aromatase inhibitor. She will continue this therapy through January 2026. She endorses some hot flashes and general arthralgias that are worse in the morning. She is not taking any medications for these symptoms. She remains active with exercise and family activities.    SOCIAL HISTORY  She is a retired dietitian.        PAST HISTORIES:  Past medical history, surgical  history, family history, and social history were reviewed and updated.    MEDICATIONS / ALLERGIES:  Medications and allergies were reviewed and updated.    REVIEW OF SYSTEMS:  All other systems are reviewed and are negative except as documented in the history of present illness.  Denies anxiety, depression or insomnia    PHYSICAL EXAM:  Vital Signs:   Oncology Encounter Vitals [06/10/25 0840]   ONC OP Encounter Vitals Group      BP (!) 151/73      Heart Rate 69      Resp 16      Temp 97.9 °F (36.6 °C)      Temp src Oral      SpO2 97 %      Weight 145 lb (65.8 kg)      Height       Pain Score  0      Pain Location       Pain Education?       BSA (Calculated - m2) - [employer]       BSA (Calculated - sq m)       BMI (Calculated)      ECOG Performance Status:   ECOG [06/10/25 0840]   ECOG Performance Status 0     General:  Alert and oriented to time and place, well appearing, no distress  HEENT: PERRLA, EOMI.   Neck: Supple, no lymphadenopathy  Chest: no respiratory distress  Abd: Soft, NT/ND.  No splenomegaly appreciated  Breast: R breast soft, without masses or lesions; L breast with medial, inner quadrant at 9 o clock position tenderness to palpation with questionable underlying calcification vs bone  Ext: No edema in lower extremities bilaterally. No pain to palpation, symmetric.   Lymph nodes:  No cervical, supraclavicular, or axillary adenopathy.  Skin: no rashes or bruises.  Psych: affect appropriate; speech normal; cooperative; logical and goal directed      DATA REVIEWED:  Laboratory Data:  Recent Labs   Lab 07/03/24  0827   WBC 5.8   RBC 4.60   HGB 13.6   HCT 40.5   MCV 88.0      Absolute Neutrophils 3.0   Absolute Lymphocytes 2.2   Absolute Monocytes 0.4   Absolute Eosinophils  0.1   Absolute Basophils 0.0       Recent Labs   Lab 11/19/24  1045 07/03/24  0827   Glucose  --  97   Sodium  --  142   Potassium  --  4.2   Chloride  --  109   Carbon Dioxide  --  27   BUN  --  19   Creatinine  --  0.59    Calcium  --  9.4   Protein, Total 7.7 7.3   Albumin 4.0 3.9   GOT/AST 24 21   Alkaline Phosphatase 118* 123*   GPT 28 29       Recent Labs   Lab 11/19/24  1045 07/03/24  0827   Anion Gap  --  10   Globulin  --  3.4   Bilirubin, Total 1.0 1.0         Imaging Studies:  Imaging studies reviewed. The pertinent positives are reviewed    Problem List Items Addressed This Visit        Oncology Problems    Malignant neoplasm of upper-outer quadrant of right breast in female, estrogen receptor positive (CMD) - Primary    Relevant Medications    anastrozole (Arimidex) 1 MG tablet    Other Relevant Orders    MRI BREAST SCREEN BILATERAL W WO CONTRAST    BD DEXA SCAN AXIAL SKELETON    CBC with Automated Differential    Comprehensive Metabolic Panel    [place] Draw   Other Visit Diagnoses       Long term (current) use of aromatase inhibitors        Relevant Orders    BD DEXA SCAN AXIAL SKELETON              ASSESSMENT AND PLAN:  Breast cancer.  Stage 0 (pTis (DCIS), cN0, cM0, G1, ER+, CO-).  Status post partial mastectomy December 2020 and has since been on anastrozole.  Radiation therapy was deferred. 5 years of therapy will be completed Jan 2026 Las mammogram Jan 2025  Osteopenia.  Repeat DEXA scan planned for October 2025.  Continues with calcium and vitamin D supplementation    Treatment intent:  adjuvant    Follow Up  DEXA scan  MRI breast then phone call after  Labs and MD in 6 months with mammogram. To complete anastrazole 1/2026    No clinical trial available.  Discussed oral chemo adherence and side effects with patient.  Patient is taking medication as prescribed.    The patient indicated understanding of the diagnosis and agreed with the plan of care. The patient is encouraged to call between now and next visit with any problems, questions or concerns that arise.    Sumit Abernathy MD  Department of Hematology/Oncology      no